# Patient Record
Sex: MALE | Race: BLACK OR AFRICAN AMERICAN | Employment: OTHER | URBAN - METROPOLITAN AREA
[De-identification: names, ages, dates, MRNs, and addresses within clinical notes are randomized per-mention and may not be internally consistent; named-entity substitution may affect disease eponyms.]

---

## 2018-05-08 ENCOUNTER — HOSPITAL ENCOUNTER (OUTPATIENT)
Dept: MRI IMAGING | Age: 48
Discharge: HOME | End: 2018-05-10
Payer: MEDICAID

## 2018-05-08 DIAGNOSIS — D49.7 PITUITARY TUMOR: ICD-10-CM

## 2018-10-15 ENCOUNTER — OFFICE VISIT (OUTPATIENT)
Dept: FAMILY MEDICINE CLINIC | Age: 48
End: 2018-10-15
Payer: MEDICARE

## 2018-10-15 VITALS
BODY MASS INDEX: 28.75 KG/M2 | DIASTOLIC BLOOD PRESSURE: 72 MMHG | TEMPERATURE: 97.9 F | SYSTOLIC BLOOD PRESSURE: 110 MMHG | WEIGHT: 231.25 LBS | RESPIRATION RATE: 18 BRPM | OXYGEN SATURATION: 99 % | HEART RATE: 75 BPM | HEIGHT: 75 IN

## 2018-10-15 DIAGNOSIS — D49.7 PITUITARY TUMOR: ICD-10-CM

## 2018-10-15 DIAGNOSIS — G44.011 INTRACTABLE EPISODIC CLUSTER HEADACHE: ICD-10-CM

## 2018-10-15 DIAGNOSIS — R07.9 CHEST PAIN, UNSPECIFIED TYPE: ICD-10-CM

## 2018-10-15 DIAGNOSIS — K21.9 GASTROESOPHAGEAL REFLUX DISEASE WITHOUT ESOPHAGITIS: ICD-10-CM

## 2018-10-15 DIAGNOSIS — E55.9 VITAMIN D DEFICIENCY: ICD-10-CM

## 2018-10-15 DIAGNOSIS — E34.9 TESTOSTERONE DEFICIENCY: ICD-10-CM

## 2018-10-15 DIAGNOSIS — R07.9 CHEST PAIN, UNSPECIFIED TYPE: Primary | ICD-10-CM

## 2018-10-15 DIAGNOSIS — E07.9 THYROID DISEASE: ICD-10-CM

## 2018-10-15 PROCEDURE — 93000 ELECTROCARDIOGRAM COMPLETE: CPT | Performed by: NURSE PRACTITIONER

## 2018-10-15 PROCEDURE — 99204 OFFICE O/P NEW MOD 45 MIN: CPT | Performed by: NURSE PRACTITIONER

## 2018-10-15 RX ORDER — ERGOCALCIFEROL 1.25 MG/1
50000 CAPSULE ORAL WEEKLY
Qty: 4 CAPSULE | Refills: 3 | Status: SHIPPED | OUTPATIENT
Start: 2018-10-15 | End: 2019-01-15

## 2018-10-15 RX ORDER — SUMATRIPTAN 25 MG/1
25 TABLET, FILM COATED ORAL
Qty: 30 TABLET | Refills: 3 | Status: SHIPPED | OUTPATIENT
Start: 2018-10-15 | End: 2018-10-15 | Stop reason: CLARIF

## 2018-10-15 RX ORDER — SUMATRIPTAN 25 MG/1
25 TABLET, FILM COATED ORAL
Qty: 10 TABLET | Refills: 1 | Status: ON HOLD | OUTPATIENT
Start: 2018-10-15 | End: 2018-12-01 | Stop reason: HOSPADM

## 2018-10-15 RX ORDER — TESTOSTERONE 12.5 MG/1.25G
5 GEL TOPICAL DAILY
COMMUNITY
End: 2019-01-15 | Stop reason: SDUPTHER

## 2018-10-15 RX ORDER — OMEPRAZOLE 20 MG/1
20 TABLET, DELAYED RELEASE ORAL DAILY
Qty: 30 TABLET | Refills: 1 | Status: SHIPPED | OUTPATIENT
Start: 2018-10-15 | End: 2018-10-15 | Stop reason: SDUPTHER

## 2018-10-15 ASSESSMENT — PATIENT HEALTH QUESTIONNAIRE - PHQ9
SUM OF ALL RESPONSES TO PHQ QUESTIONS 1-9: 1
SUM OF ALL RESPONSES TO PHQ9 QUESTIONS 1 & 2: 1
SUM OF ALL RESPONSES TO PHQ QUESTIONS 1-9: 1
1. LITTLE INTEREST OR PLEASURE IN DOING THINGS: 0
2. FEELING DOWN, DEPRESSED OR HOPELESS: 1

## 2018-10-15 ASSESSMENT — ENCOUNTER SYMPTOMS
SHORTNESS OF BREATH: 0
CONSTIPATION: 0
DIARRHEA: 0
WHEEZING: 0
COUGH: 0
NAUSEA: 0
VOMITING: 0

## 2018-10-17 RX ORDER — OMEPRAZOLE 20 MG/1
CAPSULE, DELAYED RELEASE ORAL
Qty: 90 CAPSULE | Refills: 1 | Status: ON HOLD | OUTPATIENT
Start: 2018-10-17 | End: 2018-12-01 | Stop reason: HOSPADM

## 2018-12-01 ENCOUNTER — HOSPITAL ENCOUNTER (EMERGENCY)
Age: 48
Discharge: HOME | End: 2018-12-01
Attending: EMERGENCY MEDICINE
Payer: MEDICARE

## 2018-12-01 ENCOUNTER — ANESTHESIA EVENT (OUTPATIENT)
Dept: ENDOSCOPY | Age: 48
End: 2018-12-01
Payer: MEDICARE

## 2018-12-01 ENCOUNTER — APPOINTMENT (OUTPATIENT)
Dept: GENERAL RADIOLOGY | Age: 48
End: 2018-12-01
Payer: MEDICARE

## 2018-12-01 ENCOUNTER — APPOINTMENT (OUTPATIENT)
Dept: CT IMAGING | Age: 48
End: 2018-12-01
Payer: MEDICARE

## 2018-12-01 ENCOUNTER — ANESTHESIA (OUTPATIENT)
Dept: ENDOSCOPY | Age: 48
End: 2018-12-01
Payer: MEDICARE

## 2018-12-01 VITALS
SYSTOLIC BLOOD PRESSURE: 118 MMHG | TEMPERATURE: 97.6 F | HEART RATE: 63 BPM | RESPIRATION RATE: 12 BRPM | OXYGEN SATURATION: 97 % | DIASTOLIC BLOOD PRESSURE: 76 MMHG | BODY MASS INDEX: 28.12 KG/M2 | WEIGHT: 225 LBS

## 2018-12-01 VITALS — SYSTOLIC BLOOD PRESSURE: 112 MMHG | OXYGEN SATURATION: 70 % | DIASTOLIC BLOOD PRESSURE: 67 MMHG

## 2018-12-01 DIAGNOSIS — R10.13 ABDOMINAL PAIN, EPIGASTRIC: Primary | ICD-10-CM

## 2018-12-01 LAB
ALBUMIN SERPL-MCNC: 4 G/DL (ref 3.5–5.2)
ALP BLD-CCNC: 42 U/L (ref 40–129)
ALT SERPL-CCNC: 17 U/L (ref 0–40)
ANION GAP SERPL CALCULATED.3IONS-SCNC: 17 MMOL/L (ref 7–16)
APTT: 31.9 SEC (ref 24.5–35.1)
AST SERPL-CCNC: 20 U/L (ref 0–39)
BASOPHILS ABSOLUTE: 0.05 E9/L (ref 0–0.2)
BASOPHILS RELATIVE PERCENT: 0.5 % (ref 0–2)
BILIRUB SERPL-MCNC: <0.2 MG/DL (ref 0–1.2)
BUN BLDV-MCNC: 10 MG/DL (ref 6–20)
CALCIUM SERPL-MCNC: 8.8 MG/DL (ref 8.6–10.2)
CHLORIDE BLD-SCNC: 98 MMOL/L (ref 98–107)
CO2: 22 MMOL/L (ref 22–29)
CREAT SERPL-MCNC: 1 MG/DL (ref 0.7–1.2)
EOSINOPHILS ABSOLUTE: 0.13 E9/L (ref 0.05–0.5)
EOSINOPHILS RELATIVE PERCENT: 1.3 % (ref 0–6)
GFR AFRICAN AMERICAN: >60
GFR NON-AFRICAN AMERICAN: >60 ML/MIN/1.73
GLUCOSE BLD-MCNC: 81 MG/DL (ref 74–99)
HCT VFR BLD CALC: 34.4 % (ref 37–54)
HEMOGLOBIN: 11.5 G/DL (ref 12.5–16.5)
IMMATURE GRANULOCYTES #: 0.08 E9/L
IMMATURE GRANULOCYTES %: 0.8 % (ref 0–5)
INR BLD: 1.1
LACTIC ACID: 2.7 MMOL/L (ref 0.5–2.2)
LIPASE: 34 U/L (ref 13–60)
LYMPHOCYTES ABSOLUTE: 6.12 E9/L (ref 1.5–4)
LYMPHOCYTES RELATIVE PERCENT: 59.4 % (ref 20–42)
MCH RBC QN AUTO: 29.9 PG (ref 26–35)
MCHC RBC AUTO-ENTMCNC: 33.4 % (ref 32–34.5)
MCV RBC AUTO: 89.6 FL (ref 80–99.9)
MONOCYTES ABSOLUTE: 0.72 E9/L (ref 0.1–0.95)
MONOCYTES RELATIVE PERCENT: 7 % (ref 2–12)
NEUTROPHILS ABSOLUTE: 3.21 E9/L (ref 1.8–7.3)
NEUTROPHILS RELATIVE PERCENT: 31 % (ref 43–80)
PDW BLD-RTO: 13.6 FL (ref 11.5–15)
PLATELET # BLD: 249 E9/L (ref 130–450)
PMV BLD AUTO: 9.9 FL (ref 7–12)
POTASSIUM REFLEX MAGNESIUM: 3.7 MMOL/L (ref 3.5–5)
PROTHROMBIN TIME: 12.3 SEC (ref 9.3–12.4)
RBC # BLD: 3.84 E12/L (ref 3.8–5.8)
SODIUM BLD-SCNC: 137 MMOL/L (ref 132–146)
TOTAL PROTEIN: 6.9 G/DL (ref 6.4–8.3)
WBC # BLD: 10.3 E9/L (ref 4.5–11.5)

## 2018-12-01 PROCEDURE — 85730 THROMBOPLASTIN TIME PARTIAL: CPT

## 2018-12-01 PROCEDURE — 6360000002 HC RX W HCPCS: Performed by: NURSE ANESTHETIST, CERTIFIED REGISTERED

## 2018-12-01 PROCEDURE — C9113 INJ PANTOPRAZOLE SODIUM, VIA: HCPCS | Performed by: EMERGENCY MEDICINE

## 2018-12-01 PROCEDURE — 80053 COMPREHEN METABOLIC PANEL: CPT

## 2018-12-01 PROCEDURE — 7100000011 HC PHASE II RECOVERY - ADDTL 15 MIN: Performed by: SURGERY

## 2018-12-01 PROCEDURE — 85025 COMPLETE CBC W/AUTO DIFF WBC: CPT

## 2018-12-01 PROCEDURE — 43239 EGD BIOPSY SINGLE/MULTIPLE: CPT | Performed by: SURGERY

## 2018-12-01 PROCEDURE — 83605 ASSAY OF LACTIC ACID: CPT

## 2018-12-01 PROCEDURE — 96374 THER/PROPH/DIAG INJ IV PUSH: CPT

## 2018-12-01 PROCEDURE — 2709999900 HC NON-CHARGEABLE SUPPLY: Performed by: SURGERY

## 2018-12-01 PROCEDURE — 85610 PROTHROMBIN TIME: CPT

## 2018-12-01 PROCEDURE — 7100000010 HC PHASE II RECOVERY - FIRST 15 MIN: Performed by: SURGERY

## 2018-12-01 PROCEDURE — 93005 ELECTROCARDIOGRAM TRACING: CPT | Performed by: EMERGENCY MEDICINE

## 2018-12-01 PROCEDURE — 6360000004 HC RX CONTRAST MEDICATION: Performed by: RADIOLOGY

## 2018-12-01 PROCEDURE — 74177 CT ABD & PELVIS W/CONTRAST: CPT

## 2018-12-01 PROCEDURE — 3700000001 HC ADD 15 MINUTES (ANESTHESIA): Performed by: SURGERY

## 2018-12-01 PROCEDURE — 83690 ASSAY OF LIPASE: CPT

## 2018-12-01 PROCEDURE — 2580000003 HC RX 258: Performed by: NURSE ANESTHETIST, CERTIFIED REGISTERED

## 2018-12-01 PROCEDURE — 88305 TISSUE EXAM BY PATHOLOGIST: CPT

## 2018-12-01 PROCEDURE — 99284 EMERGENCY DEPT VISIT MOD MDM: CPT | Performed by: SURGERY

## 2018-12-01 PROCEDURE — 71045 X-RAY EXAM CHEST 1 VIEW: CPT

## 2018-12-01 PROCEDURE — 3609012400 HC EGD TRANSORAL BIOPSY SINGLE/MULTIPLE: Performed by: SURGERY

## 2018-12-01 PROCEDURE — 99285 EMERGENCY DEPT VISIT HI MDM: CPT

## 2018-12-01 PROCEDURE — 2580000003 HC RX 258: Performed by: EMERGENCY MEDICINE

## 2018-12-01 PROCEDURE — 88342 IMHCHEM/IMCYTCHM 1ST ANTB: CPT

## 2018-12-01 PROCEDURE — 6360000002 HC RX W HCPCS: Performed by: EMERGENCY MEDICINE

## 2018-12-01 PROCEDURE — 96375 TX/PRO/DX INJ NEW DRUG ADDON: CPT

## 2018-12-01 PROCEDURE — 3700000000 HC ANESTHESIA ATTENDED CARE: Performed by: SURGERY

## 2018-12-01 PROCEDURE — 36415 COLL VENOUS BLD VENIPUNCTURE: CPT

## 2018-12-01 RX ORDER — MORPHINE SULFATE 4 MG/ML
8 INJECTION, SOLUTION INTRAMUSCULAR; INTRAVENOUS ONCE
Status: COMPLETED | OUTPATIENT
Start: 2018-12-01 | End: 2018-12-01

## 2018-12-01 RX ORDER — PROPOFOL 10 MG/ML
INJECTION, EMULSION INTRAVENOUS PRN
Status: DISCONTINUED | OUTPATIENT
Start: 2018-12-01 | End: 2018-12-01 | Stop reason: SDUPTHER

## 2018-12-01 RX ORDER — SODIUM CHLORIDE 9 MG/ML
INJECTION, SOLUTION INTRAVENOUS CONTINUOUS PRN
Status: DISCONTINUED | OUTPATIENT
Start: 2018-12-01 | End: 2018-12-01 | Stop reason: SDUPTHER

## 2018-12-01 RX ORDER — 0.9 % SODIUM CHLORIDE 0.9 %
1000 INTRAVENOUS SOLUTION INTRAVENOUS ONCE
Status: COMPLETED | OUTPATIENT
Start: 2018-12-01 | End: 2018-12-01

## 2018-12-01 RX ORDER — SUCRALFATE 1 G/1
1 TABLET ORAL 4 TIMES DAILY
Qty: 120 TABLET | Refills: 3 | Status: SHIPPED | OUTPATIENT
Start: 2018-12-01 | End: 2020-02-14 | Stop reason: SDUPTHER

## 2018-12-01 RX ORDER — PANTOPRAZOLE SODIUM 40 MG/1
40 TABLET, DELAYED RELEASE ORAL
Qty: 30 TABLET | Refills: 5 | Status: SHIPPED | OUTPATIENT
Start: 2018-12-01 | End: 2019-06-05 | Stop reason: SDUPTHER

## 2018-12-01 RX ORDER — PANTOPRAZOLE SODIUM 40 MG/10ML
40 INJECTION, POWDER, LYOPHILIZED, FOR SOLUTION INTRAVENOUS ONCE
Status: COMPLETED | OUTPATIENT
Start: 2018-12-01 | End: 2018-12-01

## 2018-12-01 RX ORDER — ONDANSETRON 2 MG/ML
4 INJECTION INTRAMUSCULAR; INTRAVENOUS EVERY 6 HOURS PRN
Status: DISCONTINUED | OUTPATIENT
Start: 2018-12-01 | End: 2018-12-01 | Stop reason: HOSPADM

## 2018-12-01 RX ADMIN — SODIUM CHLORIDE 1000 ML: 9 INJECTION, SOLUTION INTRAVENOUS at 09:37

## 2018-12-01 RX ADMIN — HYDROMORPHONE HYDROCHLORIDE 1 MG: 1 INJECTION, SOLUTION INTRAMUSCULAR; INTRAVENOUS; SUBCUTANEOUS at 12:28

## 2018-12-01 RX ADMIN — IOPAMIDOL 110 ML: 755 INJECTION, SOLUTION INTRAVENOUS at 11:04

## 2018-12-01 RX ADMIN — PANTOPRAZOLE SODIUM 40 MG: 40 INJECTION, POWDER, FOR SOLUTION INTRAVENOUS at 09:48

## 2018-12-01 RX ADMIN — PROPOFOL 280 MG: 10 INJECTION, EMULSION INTRAVENOUS at 13:07

## 2018-12-01 RX ADMIN — MORPHINE SULFATE 8 MG: 4 INJECTION, SOLUTION INTRAMUSCULAR; INTRAVENOUS at 09:48

## 2018-12-01 RX ADMIN — SODIUM CHLORIDE: 9 INJECTION, SOLUTION INTRAVENOUS at 12:57

## 2018-12-01 RX ADMIN — ONDANSETRON 4 MG: 2 INJECTION INTRAMUSCULAR; INTRAVENOUS at 09:48

## 2018-12-01 ASSESSMENT — PAIN SCALES - GENERAL
PAINLEVEL_OUTOF10: 0
PAINLEVEL_OUTOF10: 0
PAINLEVEL_OUTOF10: 9
PAINLEVEL_OUTOF10: 10
PAINLEVEL_OUTOF10: 10
PAINLEVEL_OUTOF10: 0

## 2018-12-01 ASSESSMENT — ENCOUNTER SYMPTOMS
VOMITING: 1
ABDOMINAL PAIN: 1
RESPIRATORY NEGATIVE: 1
NAUSEA: 1
EYES NEGATIVE: 1

## 2018-12-01 ASSESSMENT — PAIN DESCRIPTION - LOCATION
LOCATION: ABDOMEN
LOCATION: ABDOMEN

## 2018-12-01 ASSESSMENT — PAIN DESCRIPTION - PAIN TYPE
TYPE: ACUTE PAIN
TYPE: ACUTE PAIN

## 2018-12-01 ASSESSMENT — LIFESTYLE VARIABLES: SMOKING_STATUS: 1
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
## 2018-12-01 NOTE — ANESTHESIA PRE PROCEDURE
since                                                                                BMI:   Wt Readings from Last 3 Encounters:   18 225 lb (102.1 kg)   10/15/18 231 lb 4 oz (104.9 kg)   18 222 lb (100.7 kg)     Body mass index is 28.12 kg/m². CBC:   Lab Results   Component Value Date    WBC 10.3 2018    RBC 3.84 2018    HGB 11.5 2018    HCT 34.4 2018    MCV 89.6 2018    RDW 13.6 2018     2018       CMP:   Lab Results   Component Value Date     2018    K 3.7 2018    CL 98 2018    CO2 22 2018    BUN 10 2018    CREATININE 1.0 2018    GFRAA >60 2018    LABGLOM >60 2018    GLUCOSE 81 2018    GLUCOSE 74 2011    PROT 6.9 2018    CALCIUM 8.8 2018    BILITOT <0.2 2018    ALKPHOS 42 2018    AST 20 2018    ALT 17 2018       POC Tests: No results for input(s): POCGLU, POCNA, POCK, POCCL, POCBUN, POCHEMO, POCHCT in the last 72 hours. Coags:   Lab Results   Component Value Date    PROTIME 12.3 2018    PROTIME 11.0 2011    INR 1.1 2018    APTT 31.9 2018       HCG (If Applicable): No results found for: PREGTESTUR, PREGSERUM, HCG, HCGQUANT     ABGs: No results found for: PHART, PO2ART, IJF3UAZ, DLB1NKE, BEART, X4RKYLIN     Type & Screen (If Applicable):  No results found for: LABABO, LABRH    Ct Abdomen Pelvis W Iv Contrast Additional Contrast? None    Result Date: 2018  Patient MRN:  [id number] : 1970 Age: 50 years Gender: Male Order Date:  2018 9:30 AM EXAM: CT ABDOMEN PELVIS W IV CONTRAST NUMBER OF IMAGES \ views:  160 INDICATION:  Epigastric abdominal pain COMPARISON: 2016 Technique: Low-dose CT  acquisition technique included one of following options; 1 . Automated exposure control, 2. Adjustment of MA and or KV according to patient's size or 3. Use of iterative reconstruction.  Multiple computerized tomography

## 2018-12-02 PROBLEM — R10.13 ABDOMINAL PAIN, EPIGASTRIC: Status: ACTIVE | Noted: 2018-12-02

## 2018-12-03 LAB
EKG ATRIAL RATE: 67 BPM
EKG P AXIS: 50 DEGREES
EKG P-R INTERVAL: 170 MS
EKG Q-T INTERVAL: 542 MS
EKG QRS DURATION: 70 MS
EKG QTC CALCULATION (BAZETT): 572 MS
EKG R AXIS: 23 DEGREES
EKG T AXIS: -20 DEGREES
EKG VENTRICULAR RATE: 67 BPM

## 2018-12-18 ENCOUNTER — HOSPITAL ENCOUNTER (OUTPATIENT)
Age: 48
Discharge: HOME | End: 2018-12-20
Payer: MEDICARE

## 2018-12-18 ENCOUNTER — OFFICE VISIT (OUTPATIENT)
Dept: FAMILY MEDICINE CLINIC | Age: 48
End: 2018-12-18
Payer: MEDICARE

## 2018-12-18 VITALS
RESPIRATION RATE: 18 BRPM | HEIGHT: 75 IN | SYSTOLIC BLOOD PRESSURE: 126 MMHG | OXYGEN SATURATION: 98 % | DIASTOLIC BLOOD PRESSURE: 80 MMHG | BODY MASS INDEX: 29.47 KG/M2 | WEIGHT: 237 LBS | HEART RATE: 70 BPM | TEMPERATURE: 97.9 F

## 2018-12-18 DIAGNOSIS — Z77.018 SUSPECTED EXPOSURE TO HEAVY METALS: ICD-10-CM

## 2018-12-18 DIAGNOSIS — M25.552 LEFT HIP PAIN: Primary | ICD-10-CM

## 2018-12-18 DIAGNOSIS — Z87.898 H/O: PITUITARY TUMOR: ICD-10-CM

## 2018-12-18 PROCEDURE — 82495 ASSAY OF CHROMIUM: CPT

## 2018-12-18 PROCEDURE — 99214 OFFICE O/P EST MOD 30 MIN: CPT | Performed by: NURSE PRACTITIONER

## 2018-12-18 PROCEDURE — 83018 HEAVY METAL QUAN EACH NES: CPT

## 2018-12-18 ASSESSMENT — ENCOUNTER SYMPTOMS
CONSTIPATION: 0
COUGH: 0
NAUSEA: 0
SHORTNESS OF BREATH: 0
WHEEZING: 0
DIARRHEA: 0
VOMITING: 0

## 2018-12-21 LAB
CHROMIUM, SERUM: <1 UG/L
COBALT, PLASMA: <1 UG/L

## 2018-12-27 ENCOUNTER — HOSPITAL ENCOUNTER (OUTPATIENT)
Dept: GENERAL RADIOLOGY | Age: 48
Discharge: HOME | End: 2018-12-29
Payer: MEDICARE

## 2018-12-27 ENCOUNTER — HOSPITAL ENCOUNTER (OUTPATIENT)
Age: 48
Discharge: HOME | End: 2018-12-29
Payer: MEDICARE

## 2018-12-27 DIAGNOSIS — M25.552 LEFT HIP PAIN: ICD-10-CM

## 2018-12-27 PROCEDURE — 73502 X-RAY EXAM HIP UNI 2-3 VIEWS: CPT

## 2019-01-15 ENCOUNTER — OFFICE VISIT (OUTPATIENT)
Dept: ENDOCRINOLOGY | Age: 49
End: 2019-01-15
Payer: MEDICARE

## 2019-01-15 VITALS
HEART RATE: 70 BPM | DIASTOLIC BLOOD PRESSURE: 86 MMHG | HEIGHT: 75 IN | BODY MASS INDEX: 30.84 KG/M2 | OXYGEN SATURATION: 98 % | RESPIRATION RATE: 16 BRPM | WEIGHT: 248 LBS | SYSTOLIC BLOOD PRESSURE: 146 MMHG

## 2019-01-15 DIAGNOSIS — E23.0 PANHYPOPITUITARISM (HCC): ICD-10-CM

## 2019-01-15 DIAGNOSIS — E29.1 MALE HYPOGONADISM: Primary | ICD-10-CM

## 2019-01-15 DIAGNOSIS — E23.2 DIABETES INSIPIDUS (HCC): ICD-10-CM

## 2019-01-15 DIAGNOSIS — E03.8 CENTRAL HYPOTHYROIDISM: ICD-10-CM

## 2019-01-15 DIAGNOSIS — E55.9 VITAMIN D DEFICIENCY: ICD-10-CM

## 2019-01-15 PROCEDURE — 4004F PT TOBACCO SCREEN RCVD TLK: CPT | Performed by: INTERNAL MEDICINE

## 2019-01-15 PROCEDURE — G8484 FLU IMMUNIZE NO ADMIN: HCPCS | Performed by: INTERNAL MEDICINE

## 2019-01-15 PROCEDURE — G8427 DOCREV CUR MEDS BY ELIG CLIN: HCPCS | Performed by: INTERNAL MEDICINE

## 2019-01-15 PROCEDURE — 99205 OFFICE O/P NEW HI 60 MIN: CPT | Performed by: INTERNAL MEDICINE

## 2019-01-15 PROCEDURE — G8417 CALC BMI ABV UP PARAM F/U: HCPCS | Performed by: INTERNAL MEDICINE

## 2019-01-15 RX ORDER — DESMOPRESSIN ACETATE 0.2 MG/1
TABLET ORAL
Qty: 225 TABLET | Refills: 3 | Status: SHIPPED | OUTPATIENT
Start: 2019-01-15 | End: 2019-07-10 | Stop reason: SDUPTHER

## 2019-01-15 RX ORDER — TESTOSTERONE 12.5 MG/1.25G
GEL TOPICAL
Qty: 20 PACKAGE | Refills: 3 | Status: SHIPPED | OUTPATIENT
Start: 2019-01-15 | End: 2019-03-01

## 2019-01-15 RX ORDER — LEVOTHYROXINE SODIUM 137 UG/1
137 TABLET ORAL DAILY
Qty: 30 TABLET | Refills: 5 | Status: SHIPPED | OUTPATIENT
Start: 2019-01-15 | End: 2019-04-02

## 2019-01-15 RX ORDER — HYDROCORTISONE 20 MG/1
TABLET ORAL
Qty: 135 TABLET | Refills: 5 | Status: SHIPPED | OUTPATIENT
Start: 2019-01-15 | End: 2019-07-10 | Stop reason: SDUPTHER

## 2019-01-21 PROBLEM — E23.0 PANHYPOPITUITARISM (HCC): Status: ACTIVE | Noted: 2019-01-21

## 2019-01-21 PROBLEM — E29.1 MALE HYPOGONADISM: Status: ACTIVE | Noted: 2019-01-21

## 2019-01-21 PROBLEM — E55.9 VITAMIN D DEFICIENCY: Status: ACTIVE | Noted: 2019-01-21

## 2019-01-21 PROBLEM — E23.2 DIABETES INSIPIDUS (HCC): Status: ACTIVE | Noted: 2019-01-21

## 2019-02-19 ENCOUNTER — TELEPHONE (OUTPATIENT)
Dept: SURGERY | Age: 49
End: 2019-02-19

## 2019-02-27 ENCOUNTER — HOSPITAL ENCOUNTER (EMERGENCY)
Age: 49
Discharge: HOME | End: 2019-02-27
Attending: EMERGENCY MEDICINE
Payer: MEDICARE

## 2019-02-27 ENCOUNTER — APPOINTMENT (OUTPATIENT)
Dept: CT IMAGING | Age: 49
End: 2019-02-27
Payer: MEDICARE

## 2019-02-27 ENCOUNTER — APPOINTMENT (OUTPATIENT)
Dept: GENERAL RADIOLOGY | Age: 49
End: 2019-02-27
Payer: MEDICARE

## 2019-02-27 ENCOUNTER — TELEPHONE (OUTPATIENT)
Dept: FAMILY MEDICINE CLINIC | Age: 49
End: 2019-02-27

## 2019-02-27 VITALS
HEART RATE: 69 BPM | DIASTOLIC BLOOD PRESSURE: 96 MMHG | HEIGHT: 75 IN | RESPIRATION RATE: 14 BRPM | WEIGHT: 240 LBS | SYSTOLIC BLOOD PRESSURE: 115 MMHG | TEMPERATURE: 97.8 F | BODY MASS INDEX: 29.84 KG/M2 | OXYGEN SATURATION: 98 %

## 2019-02-27 DIAGNOSIS — H81.10 BENIGN PAROXYSMAL POSITIONAL VERTIGO, UNSPECIFIED LATERALITY: Primary | ICD-10-CM

## 2019-02-27 DIAGNOSIS — B34.9 VIRAL ILLNESS: ICD-10-CM

## 2019-02-27 LAB
ANION GAP SERPL CALCULATED.3IONS-SCNC: 13 MMOL/L (ref 7–16)
BASOPHILS ABSOLUTE: 0.06 E9/L (ref 0–0.2)
BASOPHILS RELATIVE PERCENT: 0.5 % (ref 0–2)
BUN BLDV-MCNC: 15 MG/DL (ref 6–20)
CALCIUM SERPL-MCNC: 9.8 MG/DL (ref 8.6–10.2)
CHLORIDE BLD-SCNC: 100 MMOL/L (ref 98–107)
CO2: 26 MMOL/L (ref 22–29)
CREAT SERPL-MCNC: 0.9 MG/DL (ref 0.7–1.2)
EOSINOPHILS ABSOLUTE: 0.15 E9/L (ref 0.05–0.5)
EOSINOPHILS RELATIVE PERCENT: 1.3 % (ref 0–6)
GFR AFRICAN AMERICAN: >60
GFR NON-AFRICAN AMERICAN: >60 ML/MIN/1.73
GLUCOSE BLD-MCNC: 98 MG/DL (ref 74–99)
HCT VFR BLD CALC: 39.5 % (ref 37–54)
HEMOGLOBIN: 12.9 G/DL (ref 12.5–16.5)
IMMATURE GRANULOCYTES #: 0.12 E9/L
IMMATURE GRANULOCYTES %: 1.1 % (ref 0–5)
INFLUENZA A BY PCR: NOT DETECTED
INFLUENZA B BY PCR: NOT DETECTED
LACTIC ACID: 1.1 MMOL/L (ref 0.5–2.2)
LYMPHOCYTES ABSOLUTE: 5.26 E9/L (ref 1.5–4)
LYMPHOCYTES RELATIVE PERCENT: 46.9 % (ref 20–42)
MCH RBC QN AUTO: 30.5 PG (ref 26–35)
MCHC RBC AUTO-ENTMCNC: 32.7 % (ref 32–34.5)
MCV RBC AUTO: 93.4 FL (ref 80–99.9)
MONOCYTES ABSOLUTE: 0.66 E9/L (ref 0.1–0.95)
MONOCYTES RELATIVE PERCENT: 5.9 % (ref 2–12)
NEUTROPHILS ABSOLUTE: 4.97 E9/L (ref 1.8–7.3)
NEUTROPHILS RELATIVE PERCENT: 44.3 % (ref 43–80)
PDW BLD-RTO: 14.5 FL (ref 11.5–15)
PLATELET # BLD: 232 E9/L (ref 130–450)
PMV BLD AUTO: 10 FL (ref 7–12)
POTASSIUM REFLEX MAGNESIUM: 3.7 MMOL/L (ref 3.5–5)
RBC # BLD: 4.23 E12/L (ref 3.8–5.8)
SODIUM BLD-SCNC: 139 MMOL/L (ref 132–146)
TROPONIN: <0.01 NG/ML (ref 0–0.03)
WBC # BLD: 11.2 E9/L (ref 4.5–11.5)

## 2019-02-27 PROCEDURE — 70450 CT HEAD/BRAIN W/O DYE: CPT

## 2019-02-27 PROCEDURE — 2580000003 HC RX 258: Performed by: EMERGENCY MEDICINE

## 2019-02-27 PROCEDURE — 96374 THER/PROPH/DIAG INJ IV PUSH: CPT

## 2019-02-27 PROCEDURE — 96375 TX/PRO/DX INJ NEW DRUG ADDON: CPT

## 2019-02-27 PROCEDURE — 6360000002 HC RX W HCPCS: Performed by: EMERGENCY MEDICINE

## 2019-02-27 PROCEDURE — 99285 EMERGENCY DEPT VISIT HI MDM: CPT

## 2019-02-27 PROCEDURE — 80048 BASIC METABOLIC PNL TOTAL CA: CPT

## 2019-02-27 PROCEDURE — 6370000000 HC RX 637 (ALT 250 FOR IP): Performed by: EMERGENCY MEDICINE

## 2019-02-27 PROCEDURE — 83605 ASSAY OF LACTIC ACID: CPT

## 2019-02-27 PROCEDURE — 36415 COLL VENOUS BLD VENIPUNCTURE: CPT

## 2019-02-27 PROCEDURE — 84484 ASSAY OF TROPONIN QUANT: CPT

## 2019-02-27 PROCEDURE — 87502 INFLUENZA DNA AMP PROBE: CPT

## 2019-02-27 PROCEDURE — 71046 X-RAY EXAM CHEST 2 VIEWS: CPT

## 2019-02-27 PROCEDURE — 85025 COMPLETE CBC W/AUTO DIFF WBC: CPT

## 2019-02-27 PROCEDURE — 93005 ELECTROCARDIOGRAM TRACING: CPT | Performed by: NURSE PRACTITIONER

## 2019-02-27 RX ORDER — ACETAMINOPHEN 500 MG
1000 TABLET ORAL ONCE
Status: COMPLETED | OUTPATIENT
Start: 2019-02-27 | End: 2019-02-27

## 2019-02-27 RX ORDER — 0.9 % SODIUM CHLORIDE 0.9 %
1000 INTRAVENOUS SOLUTION INTRAVENOUS ONCE
Status: COMPLETED | OUTPATIENT
Start: 2019-02-27 | End: 2019-02-27

## 2019-02-27 RX ORDER — LORAZEPAM 2 MG/ML
1 INJECTION INTRAMUSCULAR ONCE
Status: DISCONTINUED | OUTPATIENT
Start: 2019-02-27 | End: 2019-02-27

## 2019-02-27 RX ORDER — MECLIZINE HYDROCHLORIDE 25 MG/1
25 TABLET ORAL 3 TIMES DAILY PRN
Qty: 30 TABLET | Refills: 0 | Status: SHIPPED | OUTPATIENT
Start: 2019-02-27 | End: 2019-03-08 | Stop reason: SDUPTHER

## 2019-02-27 RX ORDER — ONDANSETRON 2 MG/ML
4 INJECTION INTRAMUSCULAR; INTRAVENOUS ONCE
Status: DISCONTINUED | OUTPATIENT
Start: 2019-02-27 | End: 2019-02-27

## 2019-02-27 RX ORDER — METOCLOPRAMIDE HYDROCHLORIDE 5 MG/ML
10 INJECTION INTRAMUSCULAR; INTRAVENOUS ONCE
Status: COMPLETED | OUTPATIENT
Start: 2019-02-27 | End: 2019-02-27

## 2019-02-27 RX ORDER — LORAZEPAM 2 MG/ML
2 INJECTION INTRAMUSCULAR ONCE
Status: COMPLETED | OUTPATIENT
Start: 2019-02-27 | End: 2019-02-27

## 2019-02-27 RX ORDER — DIPHENHYDRAMINE HYDROCHLORIDE 50 MG/ML
25 INJECTION INTRAMUSCULAR; INTRAVENOUS ONCE
Status: COMPLETED | OUTPATIENT
Start: 2019-02-27 | End: 2019-02-27

## 2019-02-27 RX ADMIN — SODIUM CHLORIDE 1000 ML: 9 INJECTION, SOLUTION INTRAVENOUS at 20:31

## 2019-02-27 RX ADMIN — DIPHENHYDRAMINE HYDROCHLORIDE 25 MG: 50 INJECTION, SOLUTION INTRAMUSCULAR; INTRAVENOUS at 20:31

## 2019-02-27 RX ADMIN — METOCLOPRAMIDE 10 MG: 5 INJECTION, SOLUTION INTRAMUSCULAR; INTRAVENOUS at 20:31

## 2019-02-27 RX ADMIN — ACETAMINOPHEN 1000 MG: 500 TABLET ORAL at 20:31

## 2019-02-27 RX ADMIN — LORAZEPAM 2 MG: 2 INJECTION INTRAMUSCULAR; INTRAVENOUS at 20:54

## 2019-02-27 ASSESSMENT — ENCOUNTER SYMPTOMS
EYE DISCHARGE: 0
WHEEZING: 0
SINUS PRESSURE: 0
NAUSEA: 0
SORE THROAT: 0
EYE REDNESS: 0
BACK PAIN: 0
EYE PAIN: 0
SHORTNESS OF BREATH: 0
VOMITING: 0
DIARRHEA: 0
ABDOMINAL PAIN: 0
COUGH: 0

## 2019-02-27 ASSESSMENT — PAIN SCALES - GENERAL: PAINLEVEL_OUTOF10: 1

## 2019-03-01 ENCOUNTER — TELEPHONE (OUTPATIENT)
Dept: ENDOCRINOLOGY | Age: 49
End: 2019-03-01

## 2019-03-01 DIAGNOSIS — E29.1 MALE HYPOGONADISM: Primary | ICD-10-CM

## 2019-03-01 LAB
EKG ATRIAL RATE: 71 BPM
EKG P AXIS: 67 DEGREES
EKG P-R INTERVAL: 170 MS
EKG Q-T INTERVAL: 752 MS
EKG QRS DURATION: 84 MS
EKG QTC CALCULATION (BAZETT): 817 MS
EKG R AXIS: 8 DEGREES
EKG T AXIS: 78 DEGREES
EKG VENTRICULAR RATE: 71 BPM

## 2019-03-01 RX ORDER — TESTOSTERONE 16.2 MG/G
GEL TRANSDERMAL
Qty: 2 BOTTLE | Refills: 2 | Status: SHIPPED | OUTPATIENT
Start: 2019-03-01 | End: 2019-04-02 | Stop reason: SDUPTHER

## 2019-03-04 ENCOUNTER — TELEPHONE (OUTPATIENT)
Dept: ENDOCRINOLOGY | Age: 49
End: 2019-03-04

## 2019-03-08 ENCOUNTER — OFFICE VISIT (OUTPATIENT)
Dept: FAMILY MEDICINE CLINIC | Age: 49
End: 2019-03-08
Payer: MEDICARE

## 2019-03-08 VITALS
RESPIRATION RATE: 16 BRPM | DIASTOLIC BLOOD PRESSURE: 78 MMHG | SYSTOLIC BLOOD PRESSURE: 112 MMHG | WEIGHT: 244 LBS | TEMPERATURE: 97.6 F | HEIGHT: 75 IN | BODY MASS INDEX: 30.34 KG/M2 | HEART RATE: 71 BPM | OXYGEN SATURATION: 98 %

## 2019-03-08 DIAGNOSIS — R11.0 NAUSEA: ICD-10-CM

## 2019-03-08 DIAGNOSIS — R42 VERTIGO: Primary | ICD-10-CM

## 2019-03-08 DIAGNOSIS — R09.81 SINUS CONGESTION: ICD-10-CM

## 2019-03-08 DIAGNOSIS — B35.4 TINEA CORPORIS: ICD-10-CM

## 2019-03-08 PROCEDURE — G8417 CALC BMI ABV UP PARAM F/U: HCPCS | Performed by: NURSE PRACTITIONER

## 2019-03-08 PROCEDURE — 99214 OFFICE O/P EST MOD 30 MIN: CPT | Performed by: NURSE PRACTITIONER

## 2019-03-08 PROCEDURE — G8484 FLU IMMUNIZE NO ADMIN: HCPCS | Performed by: NURSE PRACTITIONER

## 2019-03-08 PROCEDURE — G8427 DOCREV CUR MEDS BY ELIG CLIN: HCPCS | Performed by: NURSE PRACTITIONER

## 2019-03-08 PROCEDURE — 4004F PT TOBACCO SCREEN RCVD TLK: CPT | Performed by: NURSE PRACTITIONER

## 2019-03-08 RX ORDER — MECLIZINE HYDROCHLORIDE 25 MG/1
25 TABLET ORAL 3 TIMES DAILY PRN
Qty: 30 TABLET | Refills: 0 | Status: SHIPPED | OUTPATIENT
Start: 2019-03-08 | End: 2019-03-18

## 2019-03-08 RX ORDER — LEVOTHYROXINE SODIUM 137 UG/1
137 TABLET ORAL DAILY
Qty: 30 TABLET | Refills: 5 | Status: CANCELLED | OUTPATIENT
Start: 2019-03-08

## 2019-03-08 RX ORDER — DESMOPRESSIN ACETATE 0.2 MG/1
TABLET ORAL
Qty: 225 TABLET | Refills: 3 | Status: CANCELLED | OUTPATIENT
Start: 2019-03-08

## 2019-03-08 RX ORDER — TESTOSTERONE 16.2 MG/G
GEL TRANSDERMAL
Qty: 2 BOTTLE | Refills: 2 | Status: CANCELLED | OUTPATIENT
Start: 2019-03-08 | End: 2020-03-08

## 2019-03-08 RX ORDER — CLOTRIMAZOLE AND BETAMETHASONE DIPROPIONATE 10; .64 MG/G; MG/G
CREAM TOPICAL
Qty: 45 G | Refills: 0 | Status: SHIPPED | OUTPATIENT
Start: 2019-03-08 | End: 2019-07-10

## 2019-03-08 RX ORDER — SUCRALFATE 1 G/1
1 TABLET ORAL 4 TIMES DAILY
Qty: 120 TABLET | Refills: 3 | Status: CANCELLED | OUTPATIENT
Start: 2019-03-08

## 2019-03-08 RX ORDER — HYDROCORTISONE 20 MG/1
TABLET ORAL
Qty: 135 TABLET | Refills: 5 | Status: CANCELLED | OUTPATIENT
Start: 2019-03-08

## 2019-03-08 RX ORDER — FLUTICASONE PROPIONATE 50 MCG
2 SPRAY, SUSPENSION (ML) NASAL DAILY
Qty: 1 BOTTLE | Refills: 0 | Status: SHIPPED | OUTPATIENT
Start: 2019-03-08 | End: 2019-03-08 | Stop reason: SDUPTHER

## 2019-03-08 RX ORDER — ONDANSETRON 4 MG/1
4 TABLET, ORALLY DISINTEGRATING ORAL EVERY 8 HOURS PRN
Qty: 30 TABLET | Refills: 0 | Status: SHIPPED | OUTPATIENT
Start: 2019-03-08 | End: 2019-06-05 | Stop reason: SDUPTHER

## 2019-03-08 RX ORDER — DIPHENHYDRAMINE HCL 25 MG
25 TABLET ORAL EVERY 6 HOURS PRN
COMMUNITY
End: 2021-12-23 | Stop reason: SDUPTHER

## 2019-03-08 ASSESSMENT — ENCOUNTER SYMPTOMS
DIARRHEA: 0
SINUS PRESSURE: 1
WHEEZING: 0
SHORTNESS OF BREATH: 0
COUGH: 0
VOMITING: 0
NAUSEA: 1
CONSTIPATION: 0

## 2019-03-08 ASSESSMENT — PATIENT HEALTH QUESTIONNAIRE - PHQ9
SUM OF ALL RESPONSES TO PHQ9 QUESTIONS 1 & 2: 0
1. LITTLE INTEREST OR PLEASURE IN DOING THINGS: 0
SUM OF ALL RESPONSES TO PHQ QUESTIONS 1-9: 0
2. FEELING DOWN, DEPRESSED OR HOPELESS: 0
SUM OF ALL RESPONSES TO PHQ QUESTIONS 1-9: 0

## 2019-03-11 RX ORDER — FLUTICASONE PROPIONATE 50 MCG
SPRAY, SUSPENSION (ML) NASAL
Qty: 1 BOTTLE | Refills: 1 | Status: SHIPPED | OUTPATIENT
Start: 2019-03-11 | End: 2019-03-13 | Stop reason: SDUPTHER

## 2019-03-13 DIAGNOSIS — R09.81 SINUS CONGESTION: ICD-10-CM

## 2019-03-13 RX ORDER — FLUTICASONE PROPIONATE 50 MCG
SPRAY, SUSPENSION (ML) NASAL
Qty: 1 BOTTLE | Refills: 1 | Status: SHIPPED | OUTPATIENT
Start: 2019-03-13 | End: 2020-01-13

## 2019-03-25 ENCOUNTER — NURSE ONLY (OUTPATIENT)
Dept: FAMILY MEDICINE CLINIC | Age: 49
End: 2019-03-25
Payer: MEDICARE

## 2019-03-25 ENCOUNTER — HOSPITAL ENCOUNTER (OUTPATIENT)
Age: 49
Discharge: HOME | End: 2019-03-27
Payer: MEDICARE

## 2019-03-25 DIAGNOSIS — E55.9 VITAMIN D DEFICIENCY: ICD-10-CM

## 2019-03-25 DIAGNOSIS — E29.1 MALE HYPOGONADISM: ICD-10-CM

## 2019-03-25 DIAGNOSIS — E23.2 DIABETES INSIPIDUS (HCC): ICD-10-CM

## 2019-03-25 DIAGNOSIS — E23.0 PANHYPOPITUITARISM (HCC): ICD-10-CM

## 2019-03-25 LAB
ALBUMIN SERPL-MCNC: 4.4 G/DL (ref 3.5–5.2)
ALP BLD-CCNC: 44 U/L (ref 40–129)
ALT SERPL-CCNC: 25 U/L (ref 0–40)
ANION GAP SERPL CALCULATED.3IONS-SCNC: 13 MMOL/L (ref 7–16)
AST SERPL-CCNC: 22 U/L (ref 0–39)
BILIRUB SERPL-MCNC: <0.2 MG/DL (ref 0–1.2)
BUN BLDV-MCNC: 13 MG/DL (ref 6–20)
CALCIUM SERPL-MCNC: 9.2 MG/DL (ref 8.6–10.2)
CHLORIDE BLD-SCNC: 100 MMOL/L (ref 98–107)
CO2: 27 MMOL/L (ref 22–29)
CREAT SERPL-MCNC: 1.2 MG/DL (ref 0.7–1.2)
GFR AFRICAN AMERICAN: >60
GFR NON-AFRICAN AMERICAN: >60 ML/MIN/1.73
GLUCOSE BLD-MCNC: 100 MG/DL (ref 74–99)
HCT VFR BLD CALC: 38.6 % (ref 37–54)
HEMOGLOBIN: 12.3 G/DL (ref 12.5–16.5)
MCH RBC QN AUTO: 30.2 PG (ref 26–35)
MCHC RBC AUTO-ENTMCNC: 31.9 % (ref 32–34.5)
MCV RBC AUTO: 94.8 FL (ref 80–99.9)
PDW BLD-RTO: 14.7 FL (ref 11.5–15)
PLATELET # BLD: 233 E9/L (ref 130–450)
PMV BLD AUTO: 10.6 FL (ref 7–12)
POTASSIUM SERPL-SCNC: 4.3 MMOL/L (ref 3.5–5)
PROLACTIN: 0.05 NG/ML
PROSTATE SPECIFIC ANTIGEN: 0.6 NG/ML (ref 0–4)
RBC # BLD: 4.07 E12/L (ref 3.8–5.8)
SODIUM BLD-SCNC: 140 MMOL/L (ref 132–146)
T4 FREE: 0.86 NG/DL (ref 0.93–1.7)
TOTAL PROTEIN: 7.5 G/DL (ref 6.4–8.3)
VITAMIN D 25-HYDROXY: 16 NG/ML (ref 30–100)
WBC # BLD: 9.1 E9/L (ref 4.5–11.5)

## 2019-03-25 PROCEDURE — 84439 ASSAY OF FREE THYROXINE: CPT

## 2019-03-25 PROCEDURE — 85027 COMPLETE CBC AUTOMATED: CPT

## 2019-03-25 PROCEDURE — 84403 ASSAY OF TOTAL TESTOSTERONE: CPT

## 2019-03-25 PROCEDURE — 84153 ASSAY OF PSA TOTAL: CPT

## 2019-03-25 PROCEDURE — 84270 ASSAY OF SEX HORMONE GLOBUL: CPT

## 2019-03-25 PROCEDURE — 80053 COMPREHEN METABOLIC PANEL: CPT

## 2019-03-25 PROCEDURE — 82306 VITAMIN D 25 HYDROXY: CPT

## 2019-03-25 PROCEDURE — 36415 COLL VENOUS BLD VENIPUNCTURE: CPT | Performed by: NURSE PRACTITIONER

## 2019-03-25 PROCEDURE — 84146 ASSAY OF PROLACTIN: CPT

## 2019-03-29 LAB
SEX HORMONE BINDING GLOBULIN: 47 NMOL/L (ref 11–80)
TESTOSTERONE FREE-NONMALE: 41.9 PG/ML (ref 47–244)
TESTOSTERONE TOTAL: 270 NG/DL (ref 220–1000)

## 2019-04-02 ENCOUNTER — TELEPHONE (OUTPATIENT)
Dept: ENDOCRINOLOGY | Age: 49
End: 2019-04-02

## 2019-04-02 DIAGNOSIS — E03.8 CENTRAL HYPOTHYROIDISM: Primary | ICD-10-CM

## 2019-04-02 DIAGNOSIS — E29.1 MALE HYPOGONADISM: ICD-10-CM

## 2019-04-02 DIAGNOSIS — E55.9 VITAMIN D DEFICIENCY: ICD-10-CM

## 2019-04-02 RX ORDER — LEVOTHYROXINE SODIUM 0.15 MG/1
150 TABLET ORAL DAILY
Qty: 30 TABLET | Refills: 5 | Status: SHIPPED | OUTPATIENT
Start: 2019-04-02 | End: 2019-07-10 | Stop reason: SDUPTHER

## 2019-04-02 RX ORDER — TESTOSTERONE 16.2 MG/G
GEL TRANSDERMAL
Qty: 2 BOTTLE | Refills: 2
Start: 2019-04-02 | End: 2019-06-05 | Stop reason: SDUPTHER

## 2019-04-02 NOTE — TELEPHONE ENCOUNTER
Notify pt,  I have reviewed your recent lab results      · Thyroid hormones still low, confirm he is on levothyroxine 137 mcg daily. If so, increase to 150 mcg daily and repeat labs in 6-8 weeks   · VitD level was very low. Take Vitamin D 50.000 units one tab once a week for 12 weeks. At the end of 12 weeks, start taking Vitamin D3 2000 units daily over the counter. Prescription for weekly vitD sent to the pharmacy  · Testosterone level still below goal. Confirm he is currently on Androgel (apply 1 pump press to one upper arm and shoulder and then apply 1  pump press to the opposite upper arm and shoulder). If so, increase dose to 2 pump press to one upper arm and shoulder and then apply 2  pump press to the opposite upper arm and shoulder.  Repeat labs in 6-8 weeks

## 2019-05-06 DIAGNOSIS — R11.0 NAUSEA: ICD-10-CM

## 2019-05-06 DIAGNOSIS — E29.1 MALE HYPOGONADISM: ICD-10-CM

## 2019-05-06 NOTE — TELEPHONE ENCOUNTER
Requested Prescriptions     Pending Prescriptions Disp Refills    ondansetron (ZOFRAN ODT) 4 MG disintegrating tablet 30 tablet 0     Sig: Take 1 tablet by mouth every 8 hours as needed for Nausea or Vomiting    Testosterone (ANDROGEL PUMP) 20.25 MG/ACT (1.62%) GEL gel 2 Bottle 2     Sig: Apply 2 pump press to one upper arm and shoulder and then apply 2  pump press to the opposite upper arm and shoulder.        Next appt is 6/24/2019  Last appt was 3/8/2019

## 2019-05-07 RX ORDER — TESTOSTERONE 16.2 MG/G
GEL TRANSDERMAL
Qty: 2 BOTTLE | Refills: 2 | OUTPATIENT
Start: 2019-05-07 | End: 2020-05-06

## 2019-05-07 RX ORDER — ONDANSETRON 4 MG/1
4 TABLET, ORALLY DISINTEGRATING ORAL EVERY 8 HOURS PRN
Qty: 30 TABLET | Refills: 0 | OUTPATIENT
Start: 2019-05-07

## 2019-05-31 ENCOUNTER — OFFICE VISIT (OUTPATIENT)
Dept: FAMILY MEDICINE CLINIC | Age: 49
End: 2019-05-31
Payer: MEDICARE

## 2019-05-31 VITALS
HEIGHT: 75 IN | HEART RATE: 69 BPM | TEMPERATURE: 97.7 F | OXYGEN SATURATION: 98 % | DIASTOLIC BLOOD PRESSURE: 72 MMHG | WEIGHT: 235 LBS | BODY MASS INDEX: 29.22 KG/M2 | SYSTOLIC BLOOD PRESSURE: 118 MMHG | RESPIRATION RATE: 16 BRPM

## 2019-05-31 DIAGNOSIS — R53.83 FATIGUE, UNSPECIFIED TYPE: Primary | ICD-10-CM

## 2019-05-31 PROCEDURE — 99213 OFFICE O/P EST LOW 20 MIN: CPT | Performed by: NURSE PRACTITIONER
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
## 2019-06-01 ASSESSMENT — ENCOUNTER SYMPTOMS
DIARRHEA: 1
COLOR CHANGE: 0
CHEST TIGHTNESS: 0
COUGH: 0
VOMITING: 0
EYE PAIN: 0
SINUS PAIN: 0
SHORTNESS OF BREATH: 0
CONSTIPATION: 1
WHEEZING: 0
NAUSEA: 0

## 2019-06-05 ENCOUNTER — OFFICE VISIT (OUTPATIENT)
Dept: FAMILY MEDICINE CLINIC | Age: 49
End: 2019-06-05
Payer: MEDICARE

## 2019-06-05 VITALS
HEIGHT: 75 IN | HEART RATE: 59 BPM | BODY MASS INDEX: 29.84 KG/M2 | TEMPERATURE: 98.3 F | DIASTOLIC BLOOD PRESSURE: 70 MMHG | WEIGHT: 240 LBS | SYSTOLIC BLOOD PRESSURE: 116 MMHG | OXYGEN SATURATION: 98 %

## 2019-06-05 DIAGNOSIS — J02.0 ACUTE STREPTOCOCCAL PHARYNGITIS: Primary | ICD-10-CM

## 2019-06-05 DIAGNOSIS — M25.511 CHRONIC RIGHT SHOULDER PAIN: ICD-10-CM

## 2019-06-05 DIAGNOSIS — E55.9 VITAMIN D DEFICIENCY: ICD-10-CM

## 2019-06-05 DIAGNOSIS — M25.552 LEFT HIP PAIN: ICD-10-CM

## 2019-06-05 DIAGNOSIS — R11.0 NAUSEA: ICD-10-CM

## 2019-06-05 DIAGNOSIS — G89.29 CHRONIC RIGHT SHOULDER PAIN: ICD-10-CM

## 2019-06-05 DIAGNOSIS — E29.1 MALE HYPOGONADISM: ICD-10-CM

## 2019-06-05 PROCEDURE — 99214 OFFICE O/P EST MOD 30 MIN: CPT | Performed by: NURSE PRACTITIONER

## 2019-06-05 RX ORDER — ONDANSETRON 4 MG/1
4 TABLET, ORALLY DISINTEGRATING ORAL EVERY 8 HOURS PRN
Qty: 30 TABLET | Refills: 0 | Status: SHIPPED | OUTPATIENT
Start: 2019-06-05 | End: 2020-02-14 | Stop reason: SDUPTHER

## 2019-06-05 RX ORDER — AMOXICILLIN 500 MG/1
500 CAPSULE ORAL 3 TIMES DAILY
Qty: 30 CAPSULE | Refills: 0 | Status: SHIPPED | OUTPATIENT
Start: 2019-06-05 | End: 2019-06-15

## 2019-06-05 RX ORDER — PANTOPRAZOLE SODIUM 40 MG/1
40 TABLET, DELAYED RELEASE ORAL
Qty: 30 TABLET | Refills: 5 | Status: SHIPPED | OUTPATIENT
Start: 2019-06-05 | End: 2019-06-05 | Stop reason: SDUPTHER

## 2019-06-05 RX ORDER — CHOLECALCIFEROL (VITAMIN D3) 50 MCG
2000 TABLET ORAL DAILY
Qty: 90 TABLET | Refills: 3 | Status: SHIPPED
Start: 2019-06-05 | End: 2020-09-22 | Stop reason: ALTCHOICE

## 2019-06-05 RX ORDER — TESTOSTERONE 16.2 MG/G
GEL TRANSDERMAL
Qty: 2 BOTTLE | Refills: 2 | Status: SHIPPED | OUTPATIENT
Start: 2019-06-05 | End: 2019-07-10 | Stop reason: SDUPTHER

## 2019-06-05 RX ORDER — ERGOCALCIFEROL 1.25 MG/1
50000 CAPSULE ORAL WEEKLY
Qty: 12 CAPSULE | Refills: 0 | Status: SHIPPED | OUTPATIENT
Start: 2019-06-05 | End: 2020-01-13

## 2019-06-05 RX ORDER — PANTOPRAZOLE SODIUM 40 MG/1
TABLET, DELAYED RELEASE ORAL
Qty: 90 TABLET | Refills: 5 | Status: SHIPPED | OUTPATIENT
Start: 2019-06-05 | End: 2020-02-14 | Stop reason: SDUPTHER

## 2019-06-05 ASSESSMENT — ENCOUNTER SYMPTOMS
CHEST TIGHTNESS: 0
SINUS PAIN: 1
SHORTNESS OF BREATH: 1
WHEEZING: 0
EYE PAIN: 0
NAUSEA: 1
COUGH: 0
CONSTIPATION: 0
COLOR CHANGE: 0
DIARRHEA: 0
VOMITING: 0

## 2019-06-05 NOTE — TELEPHONE ENCOUNTER
Upon check out, Moe Lockwood asked if you could please give him a referral to a chiropractor, if you could please place the referral.  Thank you

## 2019-06-05 NOTE — PROGRESS NOTES
strain: Not on file    Food insecurity:     Worry: Not on file     Inability: Not on file    Transportation needs:     Medical: Not on file     Non-medical: Not on file   Tobacco Use    Smoking status: Current Every Day Smoker     Packs/day: 0.25     Years: 21.00     Pack years: 5.25     Types: Cigarettes, Pipe    Smokeless tobacco: Never Used    Tobacco comment: black and mild   Substance and Sexual Activity    Alcohol use: Yes     Alcohol/week: 0.0 oz    Drug use: Yes     Frequency: 2.0 times per week     Types: Marijuana     Comment: marijuana- whenever he can get it    Sexual activity: Yes     Partners: Female   Lifestyle    Physical activity:     Days per week: Not on file     Minutes per session: Not on file    Stress: Not on file   Relationships    Social connections:     Talks on phone: Not on file     Gets together: Not on file     Attends Scientologist service: Not on file     Active member of club or organization: Not on file     Attends meetings of clubs or organizations: Not on file     Relationship status: Not on file    Intimate partner violence:     Fear of current or ex partner: Not on file     Emotionally abused: Not on file     Physically abused: Not on file     Forced sexual activity: Not on file   Other Topics Concern    Not on file   Social History Narrative    ** Merged History Encounter **             Allergies   Allergen Reactions    Reglan [Metoclopramide] Other (See Comments)        Prior to Visit Medications    Medication Sig Taking? Authorizing Provider   ondansetron (ZOFRAN ODT) 4 MG disintegrating tablet Take 1 tablet by mouth every 8 hours as needed for Nausea or Vomiting Yes ANGELITO Garcia CNP   Testosterone (ANDROGEL PUMP) 20.25 MG/ACT (1.62%) GEL gel Apply 3 pump press to one upper arm and shoulder and then apply 3  pump press to the opposite upper arm and shoulder.  Yes ANGELITO Garcia CNP   vitamin D (ERGOCALCIFEROL) 87141 units CAPS capsule Take 1 capsule polyuria. Genitourinary: Negative for difficulty urinating. Musculoskeletal: Positive for arthralgias (bilateral hips replaced 5150-9806), joint swelling and myalgias. Skin: Negative for color change, rash and wound. Neurological: Positive for headaches (cluster). Negative for dizziness and light-headedness. Hematological: Negative for adenopathy. Does not bruise/bleed easily. Psychiatric/Behavioral: Positive for sleep disturbance. Negative for agitation, decreased concentration and suicidal ideas. The patient is not nervous/anxious. BP Readings from Last 1 Encounters:   06/05/19 116/70    Recheck if >140/90  Hemoglobin A1C (%)   Date Value   07/18/2014 6.7 (H)     No results found for: LABMICR    Physical Exam:    Vitals:    06/05/19 0905   BP: 116/70   Pulse: 59   Temp: 98.3 °F (36.8 °C)   TempSrc: Oral   SpO2: 98%   Weight: 240 lb (108.9 kg)   Height: 6' 3\" (1.905 m)     Physical Exam   Constitutional: He is oriented to person, place, and time. He appears well-developed and well-nourished. HENT:   Head: Normocephalic and atraumatic. Nasal turbinates are erythematous and boggy. Pharynx shows erythema and PND. Eyes: Pupils are equal, round, and reactive to light. Conjunctivae and EOM are normal.   Neck: Normal range of motion. Neck supple. No JVD present. No tracheal deviation present. Cardiovascular: Normal rate, regular rhythm, normal heart sounds and intact distal pulses. Pulmonary/Chest: Effort normal and breath sounds normal. No respiratory distress. He has no wheezes. Abdominal: Soft. Bowel sounds are normal. He exhibits no distension. Genitourinary:   Genitourinary Comments: Deferred. Musculoskeletal: He exhibits tenderness. Right shoulder: He exhibits decreased range of motion and tenderness. He exhibits no crepitus and normal strength. Right hip: He exhibits decreased strength and tenderness. He exhibits no crepitus.         Left hip: He exhibits decreased range of motion, decreased strength and tenderness. Neurological: He is alert and oriented to person, place, and time. He exhibits normal muscle tone. Skin: Skin is warm and dry. Capillary refill takes less than 2 seconds. Psychiatric: He has a normal mood and affect. His behavior is normal. Thought content normal.   Nursing note and vitals reviewed. Assessment/Plan:    Denia Ramirez was seen today for diabetes and medication refill. Diagnoses and all orders for this visit:    Acute streptococcal pharyngitis  -     amoxicillin (AMOXIL) 500 MG capsule; Take 1 capsule by mouth 3 times daily for 10 days    Nausea  -     ondansetron (ZOFRAN ODT) 4 MG disintegrating tablet; Take 1 tablet by mouth every 8 hours as needed for Nausea or Vomiting  - Denia Ramirez is now following his medication regimen correctly since his EGD. Will continue to monitor his diet and activity and report any differences. Male hypogonadism  -     Testosterone (ANDROGEL PUMP) 20.25 MG/ACT (1.62%) GEL gel; Apply 3 pump press to one upper arm and shoulder and then apply 3  pump press to the opposite upper arm and shoulder. Vitamin D deficiency  -     Discontinue: vitamin D (CHOLECALCIFEROL) 41831 UNIT CAPS; Take 1 capsule weekly  -     vitamin D (ERGOCALCIFEROL) 17978 units CAPS capsule; Take 1 capsule by mouth once a week Take 50,000 IU every Sunday and 2,000 IU everyday except Sunday. -     Cholecalciferol (VITAMIN D) 2000 units TABS tablet; Take 1 tablet by mouth daily    Left hip pain  -     [facility] - [address] right shoulder pain  -     Jayna Dillon DC,Chiropractic Medicine, [place]  has had injury to his shoulder in the past, they did not perform surgery, he reports broken clavicle. Now has pain extending across shoulders and into left arm at times.  Reports chiropractic worked in past.    Greater than 15  Minutes was spent with patient and more than 50% of

## 2019-06-07 ENCOUNTER — OFFICE VISIT (OUTPATIENT)
Dept: CHIROPRACTIC MEDICINE | Age: 49
End: 2019-06-07
Payer: MEDICARE

## 2019-06-07 VITALS
BODY MASS INDEX: 30.21 KG/M2 | WEIGHT: 243 LBS | SYSTOLIC BLOOD PRESSURE: 118 MMHG | HEART RATE: 63 BPM | TEMPERATURE: 95.3 F | HEIGHT: 75 IN | DIASTOLIC BLOOD PRESSURE: 76 MMHG

## 2019-06-07 DIAGNOSIS — M54.04 PANNICULITIS AFFECTING REGIONS OF NECK AND BACK, THORACIC REGION: ICD-10-CM

## 2019-06-07 DIAGNOSIS — M54.41 CHRONIC BILATERAL LOW BACK PAIN WITH BILATERAL SCIATICA: ICD-10-CM

## 2019-06-07 DIAGNOSIS — M54.2 CERVICALGIA: ICD-10-CM

## 2019-06-07 DIAGNOSIS — M99.01 SEGMENTAL AND SOMATIC DYSFUNCTION OF CERVICAL REGION: Primary | ICD-10-CM

## 2019-06-07 DIAGNOSIS — M54.42 CHRONIC BILATERAL LOW BACK PAIN WITH BILATERAL SCIATICA: ICD-10-CM

## 2019-06-07 DIAGNOSIS — G89.29 CHRONIC BILATERAL LOW BACK PAIN WITH BILATERAL SCIATICA: ICD-10-CM

## 2019-06-07 DIAGNOSIS — M54.50 BILATERAL LOW BACK PAIN WITHOUT SCIATICA, UNSPECIFIED CHRONICITY: ICD-10-CM

## 2019-06-07 DIAGNOSIS — M99.03 SEGMENTAL AND SOMATIC DYSFUNCTION OF LUMBAR REGION: ICD-10-CM

## 2019-06-07 DIAGNOSIS — M99.02 SEGMENTAL AND SOMATIC DYSFUNCTION OF THORACIC REGION: ICD-10-CM

## 2019-06-07 PROCEDURE — 99203 OFFICE O/P NEW LOW 30 MIN: CPT | Performed by: CHIROPRACTOR

## 2019-06-07 PROCEDURE — 98941 CHIROPRACT MANJ 3-4 REGIONS: CPT | Performed by: CHIROPRACTOR

## 2019-06-07 ASSESSMENT — ENCOUNTER SYMPTOMS
BACK PAIN: 1
BOWEL INCONTINENCE: 0
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
## 2019-07-10 ENCOUNTER — OFFICE VISIT (OUTPATIENT)
Dept: ENDOCRINOLOGY | Age: 49
End: 2019-07-10
Payer: MEDICARE

## 2019-07-10 VITALS
BODY MASS INDEX: 30.16 KG/M2 | OXYGEN SATURATION: 97 % | DIASTOLIC BLOOD PRESSURE: 84 MMHG | HEIGHT: 75 IN | RESPIRATION RATE: 16 BRPM | WEIGHT: 242.6 LBS | HEART RATE: 72 BPM | SYSTOLIC BLOOD PRESSURE: 130 MMHG

## 2019-07-10 DIAGNOSIS — E03.8 CENTRAL HYPOTHYROIDISM: ICD-10-CM

## 2019-07-10 DIAGNOSIS — E55.9 VITAMIN D DEFICIENCY: ICD-10-CM

## 2019-07-10 DIAGNOSIS — E29.1 MALE HYPOGONADISM: Primary | ICD-10-CM

## 2019-07-10 DIAGNOSIS — D35.2 PITUITARY MACROADENOMA (HCC): ICD-10-CM

## 2019-07-10 DIAGNOSIS — E23.0 PANHYPOPITUITARISM (HCC): ICD-10-CM

## 2019-07-10 DIAGNOSIS — E23.2 DIABETES INSIPIDUS (HCC): ICD-10-CM

## 2019-07-10 LAB
BUN BLDV-MCNC: NORMAL MG/DL
CALCIUM SERPL-MCNC: 9.8 MG/DL
CHLORIDE BLD-SCNC: NORMAL MMOL/L
CO2: NORMAL MMOL/L
CREAT SERPL-MCNC: 1.16 MG/DL
GFR CALCULATED: 60
GLUCOSE BLD-MCNC: NORMAL MG/DL
HCT VFR BLD CALC: 36.2 % (ref 41–53)
POTASSIUM SERPL-SCNC: 4.3 MMOL/L
SEX HORMONE BINDING GLOBULIN: NORMAL
SODIUM BLD-SCNC: 143 MMOL/L
T4 FREE: 0.77
TESTOSTERONE FREE: 113.1
TESTOSTERONE TOTAL: 754
TESTOSTERONE, BIOAVAILABLE: NORMAL

## 2019-07-10 PROCEDURE — G8417 CALC BMI ABV UP PARAM F/U: HCPCS | Performed by: INTERNAL MEDICINE

## 2019-07-10 PROCEDURE — 99215 OFFICE O/P EST HI 40 MIN: CPT | Performed by: INTERNAL MEDICINE

## 2019-07-10 PROCEDURE — G8427 DOCREV CUR MEDS BY ELIG CLIN: HCPCS | Performed by: INTERNAL MEDICINE

## 2019-07-10 PROCEDURE — 4004F PT TOBACCO SCREEN RCVD TLK: CPT | Performed by: INTERNAL MEDICINE

## 2019-07-10 RX ORDER — LEVOTHYROXINE SODIUM 0.15 MG/1
150 TABLET ORAL DAILY
Qty: 90 TABLET | Refills: 5 | Status: SHIPPED | OUTPATIENT
Start: 2019-07-10 | End: 2019-07-11

## 2019-07-10 RX ORDER — TESTOSTERONE 16.2 MG/G
GEL TRANSDERMAL
Qty: 3 BOTTLE | Refills: 2 | Status: SHIPPED | OUTPATIENT
Start: 2019-07-10 | End: 2020-01-13 | Stop reason: SDUPTHER

## 2019-07-10 RX ORDER — DESMOPRESSIN ACETATE 0.2 MG/1
TABLET ORAL
Qty: 225 TABLET | Refills: 3 | Status: SHIPPED | OUTPATIENT
Start: 2019-07-10 | End: 2020-01-13 | Stop reason: SDUPTHER

## 2019-07-10 RX ORDER — HYDROCORTISONE 20 MG/1
TABLET ORAL
Qty: 135 TABLET | Refills: 5 | Status: SHIPPED | OUTPATIENT
Start: 2019-07-10 | End: 2020-01-13 | Stop reason: SDUPTHER

## 2019-07-10 NOTE — PROGRESS NOTES
Nausea or Vomiting 30 tablet 0    Testosterone (ANDROGEL PUMP) 20.25 MG/ACT (1.62%) GEL gel Apply 3 pump press to one upper arm and shoulder and then apply 3  pump press to the opposite upper arm and shoulder. 2 Bottle 2    vitamin D (ERGOCALCIFEROL) 68386 units CAPS capsule Take 1 capsule by mouth once a week Take 50,000 IU every Sunday and 2,000 IU everyday except Sunday. 12 capsule 0    Cholecalciferol (VITAMIN D) 2000 units TABS tablet Take 1 tablet by mouth daily 90 tablet 3    pantoprazole (PROTONIX) 40 MG tablet TAKE 1 TABLET BY MOUTH EVERY MORNING BEFORE BREAKFAST 90 tablet 5    levothyroxine (SYNTHROID) 150 MCG tablet Take 1 tablet by mouth daily 30 tablet 5    fluticasone (FLONASE) 50 MCG/ACT nasal spray SPRAY 2 SPRAYS IN EACH NOSTRIL EVERY DAY 1 Bottle 1    diphenhydrAMINE (BENADRYL) 25 MG tablet Take 25 mg by mouth every 6 hours as needed for Itching      hydrocortisone (CORTEF) 20 MG tablet Take take 1 tablets in am and 1/2 tablet at 3pm 135 tablet 5    desmopressin (DDAVP) 0.2 MG tablet Tale 1 tablet in AM and 1&1/2 tab at bedtime 225 tablet 3    sucralfate (CARAFATE) 1 GM tablet Take 1 tablet by mouth 4 times daily 120 tablet 3     No current facility-administered medications for this visit. Review of Systems  Constitutional: No fever, no chills, no diaphoresis, no generalized weakness. HEENT: No blurred vision, No sore throat, no ear pain, no hair loss  Neck: denied any neck swelling, difficulty swallowing,   Cadrdiopulomary: No CP, SOB or palpitation, No orthopnea or PND. No cough or wheezing. GI: No N/V/D, no constipation, No abdominal pain, no melena or hematochezia   : Denied any dysuria, hematuria, flank pain, discharge, or incontinence. Skin: denied any rash, ulcer, Hirsute, or hyperpigmentation. MSK: denied any joint deformity, joint pain/swelling, muscle pain, or back pain.   Neuro: no numbess, no tingling, no weakness,     OBJECTIVE    /84 (Site: Left Upper Arm, hypothyroidism   · Continue levothyroxine 150 mcg daily. Advised to take levothyroxine in the morning at empty stomach, wait one hour before eating , avoid multivitamins containing calcium  or iron with it. · Will check free T4 and adjust the dose if needed (goal to keep free T4 in mid to upper range of normal)   · Only Free T4 should be used to adjust his thyroid medications    Secondary adrenal insufficiency   · Continue hydrocortisone dose to 20 mg 1 tab AM and 1/2 tab at 3 pm   · Patient need to be on at least maintenance dose of steroid all the time and stress dose for any stressful events. · Discussed the importance of wearing medical alert identification (bracelet, necklace)  · Discussed to need to increase steroid dose by 2-3 times for illness   · If vomiting pt understand the need to go to hospital for iv treatment     Hypogonadotropic hypogonadism   · Continue Androgel 3 pumps to each shoulder  daily  · Check total free Testosterone level. Will also check Hct and PSA   · Discussed all side effects of Testosterone therapy     Central DI   · Feels better   · Continue DDAVP dose to  0.2 mg 1 tab am and 1&1/2 tab at bedtime   · CMP in few days   · Discussed the importance of drinking only to thirst while on DDAVP     Return in about 6 months (around 1/10/2020) for panhypopituitarism . Thank you for allowing us to participate in the care of this patient. Please do not hesitate to contact us with any additional questions. 30 minutes were spent today in management of this patient. More than 50% of time spent on counseling of patient on above diagnosis. Diagnosis Orders   1. Male hypogonadism  Testosterone, free, total    Hematocrit    Testosterone (ANDROGEL PUMP) 20.25 MG/ACT (1.62%) GEL gel   2. Central hypothyroidism  T4, Free    levothyroxine (SYNTHROID) 150 MCG tablet    hydrocortisone (CORTEF) 20 MG tablet    desmopressin (DDAVP) 0.2 MG tablet   3.  Panhypopituitarism [facility])  Basic Metabolic Panel
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
## 2019-07-11 ENCOUNTER — TELEPHONE (OUTPATIENT)
Dept: ENDOCRINOLOGY | Age: 49
End: 2019-07-11

## 2019-07-11 DIAGNOSIS — E23.0 PANHYPOPITUITARISM (HCC): ICD-10-CM

## 2019-07-11 DIAGNOSIS — E29.1 MALE HYPOGONADISM: ICD-10-CM

## 2019-07-11 DIAGNOSIS — E55.9 VITAMIN D DEFICIENCY: ICD-10-CM

## 2019-07-11 DIAGNOSIS — E03.8 CENTRAL HYPOTHYROIDISM: Primary | ICD-10-CM

## 2019-07-11 DIAGNOSIS — E03.8 CENTRAL HYPOTHYROIDISM: ICD-10-CM

## 2019-07-11 RX ORDER — LEVOTHYROXINE SODIUM 175 UG/1
175 TABLET ORAL DAILY
Qty: 90 TABLET | Refills: 3 | Status: SHIPPED | OUTPATIENT
Start: 2019-07-11 | End: 2020-01-13 | Stop reason: SDUPTHER

## 2019-07-15 DIAGNOSIS — E23.0 PANHYPOPITUITARISM (HCC): ICD-10-CM

## 2019-07-15 DIAGNOSIS — E29.1 MALE HYPOGONADISM: ICD-10-CM

## 2019-07-17 ENCOUNTER — TELEPHONE (OUTPATIENT)
Dept: ENDOCRINOLOGY | Age: 49
End: 2019-07-17

## 2019-10-10 ENCOUNTER — TELEPHONE (OUTPATIENT)
Dept: ENDOCRINOLOGY | Age: 49
End: 2019-10-10

## 2019-10-10 DIAGNOSIS — Z01.812 ENCOUNTER FOR PREPROCEDURAL LABORATORY EXAMINATION: Primary | ICD-10-CM

## 2019-10-14 ENCOUNTER — HOSPITAL ENCOUNTER (OUTPATIENT)
Age: 49
Discharge: HOME | End: 2019-10-14
Payer: MEDICARE

## 2019-10-14 DIAGNOSIS — Z01.812 ENCOUNTER FOR PREPROCEDURAL LABORATORY EXAMINATION: ICD-10-CM

## 2019-10-14 DIAGNOSIS — E23.0 PANHYPOPITUITARISM (HCC): ICD-10-CM

## 2019-10-14 DIAGNOSIS — E29.1 MALE HYPOGONADISM: ICD-10-CM

## 2019-10-14 DIAGNOSIS — E55.9 VITAMIN D DEFICIENCY: ICD-10-CM

## 2019-10-14 LAB
ALBUMIN SERPL-MCNC: 4.9 G/DL (ref 3.5–5.2)
ALP BLD-CCNC: 49 U/L (ref 40–129)
ALT SERPL-CCNC: 20 U/L (ref 0–40)
ANION GAP SERPL CALCULATED.3IONS-SCNC: 13 MMOL/L (ref 7–16)
AST SERPL-CCNC: 18 U/L (ref 0–39)
BILIRUB SERPL-MCNC: 0.2 MG/DL (ref 0–1.2)
BUN BLDV-MCNC: 17 MG/DL (ref 6–20)
CALCIUM SERPL-MCNC: 9.6 MG/DL (ref 8.6–10.2)
CHLORIDE BLD-SCNC: 101 MMOL/L (ref 98–107)
CO2: 24 MMOL/L (ref 22–29)
CREAT SERPL-MCNC: 1.1 MG/DL (ref 0.7–1.2)
GFR AFRICAN AMERICAN: >60
GFR NON-AFRICAN AMERICAN: >60 ML/MIN/1.73
GLUCOSE BLD-MCNC: 99 MG/DL (ref 74–99)
POTASSIUM SERPL-SCNC: 3.9 MMOL/L (ref 3.5–5)
SODIUM BLD-SCNC: 138 MMOL/L (ref 132–146)
T4 FREE: 1 NG/DL (ref 0.93–1.7)
TOTAL PROTEIN: 8.1 G/DL (ref 6.4–8.3)
VITAMIN D 25-HYDROXY: 30 NG/ML (ref 30–100)

## 2019-10-14 PROCEDURE — 84403 ASSAY OF TOTAL TESTOSTERONE: CPT

## 2019-10-14 PROCEDURE — 82306 VITAMIN D 25 HYDROXY: CPT

## 2019-10-14 PROCEDURE — 84270 ASSAY OF SEX HORMONE GLOBUL: CPT

## 2019-10-14 PROCEDURE — 84439 ASSAY OF FREE THYROXINE: CPT

## 2019-10-14 PROCEDURE — 80053 COMPREHEN METABOLIC PANEL: CPT

## 2019-10-14 PROCEDURE — 36415 COLL VENOUS BLD VENIPUNCTURE: CPT

## 2019-10-16 LAB
SEX HORMONE BINDING GLOBULIN: 47 NMOL/L (ref 11–80)
TESTOSTERONE FREE-NONMALE: 10.3 PG/ML (ref 47–244)
TESTOSTERONE TOTAL: 71 NG/DL (ref 220–1000)

## 2019-10-20 ENCOUNTER — TELEPHONE (OUTPATIENT)
Dept: ENDOCRINOLOGY | Age: 49
End: 2019-10-20

## 2020-01-13 ENCOUNTER — OFFICE VISIT (OUTPATIENT)
Dept: ENDOCRINOLOGY | Age: 50
End: 2020-01-13
Payer: MEDICARE

## 2020-01-13 VITALS
RESPIRATION RATE: 16 BRPM | WEIGHT: 236.4 LBS | BODY MASS INDEX: 29.39 KG/M2 | DIASTOLIC BLOOD PRESSURE: 70 MMHG | HEART RATE: 68 BPM | OXYGEN SATURATION: 98 % | SYSTOLIC BLOOD PRESSURE: 126 MMHG | HEIGHT: 75 IN

## 2020-01-13 PROCEDURE — G8427 DOCREV CUR MEDS BY ELIG CLIN: HCPCS | Performed by: INTERNAL MEDICINE

## 2020-01-13 PROCEDURE — 4004F PT TOBACCO SCREEN RCVD TLK: CPT | Performed by: INTERNAL MEDICINE

## 2020-01-13 PROCEDURE — G8484 FLU IMMUNIZE NO ADMIN: HCPCS | Performed by: INTERNAL MEDICINE

## 2020-01-13 PROCEDURE — 99214 OFFICE O/P EST MOD 30 MIN: CPT | Performed by: INTERNAL MEDICINE

## 2020-01-13 PROCEDURE — G8417 CALC BMI ABV UP PARAM F/U: HCPCS | Performed by: INTERNAL MEDICINE

## 2020-01-13 RX ORDER — LEVOTHYROXINE SODIUM 175 UG/1
175 TABLET ORAL DAILY
Qty: 90 TABLET | Refills: 1 | Status: SHIPPED | OUTPATIENT
Start: 2020-01-13 | End: 2020-02-14 | Stop reason: SDUPTHER

## 2020-01-13 RX ORDER — DESMOPRESSIN ACETATE 0.2 MG/1
TABLET ORAL
Qty: 225 TABLET | Refills: 3 | Status: SHIPPED | OUTPATIENT
Start: 2020-01-13 | End: 2020-02-14 | Stop reason: SDUPTHER

## 2020-01-13 RX ORDER — HYDROCORTISONE 20 MG/1
TABLET ORAL
Qty: 135 TABLET | Refills: 3 | Status: SHIPPED
Start: 2020-01-13 | End: 2021-01-18

## 2020-01-13 RX ORDER — TESTOSTERONE 16.2 MG/G
GEL TRANSDERMAL
Qty: 3 BOTTLE | Refills: 2 | Status: SHIPPED | OUTPATIENT
Start: 2020-01-13 | End: 2020-06-08 | Stop reason: SDUPTHER

## 2020-01-13 NOTE — PROGRESS NOTES
ENDOCRINOLOGY CLINIC NOTE    Date of Service: 1/13/2020    Primary Care Physician: ANGELITO Chavez CNP. Provider: Jessica Lora MD      Reason for the visit:  Recurrent Pituitary adenoma s/p resection followed by gamma knife    History of Present Illness: The history is provided by the patient. No  was used. Accuracy of the patient data is excellent. Rolf Fontana is a very pleasant 52 y.o. male with past medical history of recurrent Pituitary adenoma s/p resection followed by gamma knife complicated seen today for follow up visit     In 2007, the patient was diagnosed with Pituitary adenoma when he developed visual symptoms. Transphenoidal Pituitary resection was performed by Dr. Joyce Yousif at [facility]. Patient developed Panhypopituitarism and was treated with levothyroxine, hydrocortisone, DDAVP and testosterone. In 2012, patient presented again with right eye pain and decreased peripheral vision along with fatigue. MRI Pituitary showed enhancing sellar/suprasellar mass invading into the right cavernous  sinus, encasing the right internal carotid artery and exerting mass effect upon the right optic nerve. He underwent repeat tumor removal followed by gamma knife procedure for residual tumor removal at [facility] by Dr Uzma Bartholomew. He was continued on hormone replacement as above. Patient is currently on :   --Hydrocortisone 20 mg po 1 tab QAM and 1/2 mg tab at 3 pm   -- DDAVP 0.2 mg 1 tab qam and 1&1/2 tab at bedtime   --Levothyroxine 175 mcg daily   --Androgel 1% 3 pumps on each shoulder every morning     Testosterone was 754 in July/2019.  Over the past 4 months pt was only taking 1 pump to each shoulder because he wasn't get enough refills from his pharmacy   Recent lab 10/14/2019 --> Testosterone was 71     The patient denied significant polyuria, light headedness, Slight cold intolerance, weight changes    10/14/2019 - Na 138, K 3.9, free T4 1.0, Total MEDICATIONS     Current Outpatient Medications   Medication Sig Dispense Refill    levothyroxine (SYNTHROID) 175 MCG tablet Take 1 tablet by mouth daily 90 tablet 3    Testosterone (ANDROGEL PUMP) 20.25 MG/ACT (1.62%) GEL gel Apply 3 pump press to one upper arm and shoulder and then apply 3  pump press to the opposite upper arm and shoulder. 3 Bottle 2    hydrocortisone (CORTEF) 20 MG tablet Take take 1 tablets in am and 1/2 tablet at 3pm 135 tablet 5    desmopressin (DDAVP) 0.2 MG tablet Tale 1 tablet in AM and 1&1/2 tab at bedtime 225 tablet 3    ondansetron (ZOFRAN ODT) 4 MG disintegrating tablet Take 1 tablet by mouth every 8 hours as needed for Nausea or Vomiting 30 tablet 0    Cholecalciferol (VITAMIN D) 2000 units TABS tablet Take 1 tablet by mouth daily 90 tablet 3    pantoprazole (PROTONIX) 40 MG tablet TAKE 1 TABLET BY MOUTH EVERY MORNING BEFORE BREAKFAST 90 tablet 5    diphenhydrAMINE (BENADRYL) 25 MG tablet Take 25 mg by mouth every 6 hours as needed for Itching      sucralfate (CARAFATE) 1 GM tablet Take 1 tablet by mouth 4 times daily 120 tablet 3    vitamin D (ERGOCALCIFEROL) 34778 units CAPS capsule Take 1 capsule by mouth once a week Take 50,000 IU every Sunday and 2,000 IU everyday except Sunday. (Patient not taking: Reported on 1/13/2020) 12 capsule 0     No current facility-administered medications for this visit. Review of Systems  Constitutional: No fever, no chills, no diaphoresis, no generalized weakness. HEENT: No blurred vision, No sore throat, no ear pain, no hair loss  Neck: denied any neck swelling, difficulty swallowing,   Cadrdiopulomary: No CP, SOB or palpitation, No orthopnea or PND. No cough or wheezing. GI: No N/V/D, no constipation, No abdominal pain, no melena or hematochezia   : Denied any dysuria, hematuria, flank pain, discharge, or incontinence. Skin: denied any rash, ulcer, Hirsute, or hyperpigmentation.    MSK: denied any joint deformity, joint pain/swelling, muscle pain, or back pain. Neuro: no numbess, no tingling, no weakness,     OBJECTIVE    /70 (Site: Left Upper Arm, Position: Sitting, Cuff Size: Medium Adult)   Pulse 68   Resp 16   Ht 6' 3\" (1.905 m)   Wt 236 lb 6.4 oz (107.2 kg)   SpO2 98%   BMI 29.55 kg/m²   BP Readings from Last 4 Encounters:   01/13/20 126/70   07/10/19 130/84   06/07/19 118/76   06/05/19 116/70     Wt Readings from Last 6 Encounters:   01/13/20 236 lb 6.4 oz (107.2 kg)   07/10/19 242 lb 9.6 oz (110 kg)   06/07/19 243 lb (110.2 kg)   06/05/19 240 lb (108.9 kg)   05/31/19 235 lb (106.6 kg)   03/08/19 244 lb (110.7 kg)       Physical examination:  General: awake alert, oriented x3, no abnormal position or movements. HEENT: normocephalic non traumatic,   Neck: supple, no LN enlargement, no thyromegaly, no thyroid tenderness, No buffalo hump, no supraventricular fullness,   Pulm: Clear equal air entry no added sounds, no wheezing or rhonchi    CVS: S1 + S2, no murmur, no heave. Dorsalis pedis pulse palpable   Abd: soft lax, no tenderness, no organomegaly, audible bowel sounds. Skin: warm, no lesions, no rash. no skin tags,  Neuro: CN intact, sensation normal , muscle strength normal. No tremors   Psych: normal mood, and affect    Review of Laboratory Data:  I have reviewed the following:  Lab Results   Component Value Date/Time    WBC 9.1 03/25/2019 09:15 AM    RBC 4.07 03/25/2019 09:15 AM    HGB 12.3 (L) 03/25/2019 09:15 AM    HCT 36.2 (A) 07/10/2019    MCV 94.8 03/25/2019 09:15 AM    MCH 30.2 03/25/2019 09:15 AM    MCHC 31.9 (L) 03/25/2019 09:15 AM    RDW 14.7 03/25/2019 09:15 AM     03/25/2019 09:15 AM    MPV 10.6 03/25/2019 09:15 AM      Lab Results   Component Value Date/Time     10/14/2019 01:31 PM    K 3.9 10/14/2019 01:31 PM    K 3.7 02/27/2019 08:22 PM    CO2 24 10/14/2019 01:31 PM    BUN 17 10/14/2019 01:31 PM    CALCIUM 9.6 10/14/2019 01:31 PM      Lab Results   Component Value Date/Time    TSH <0.004 (L) 07/12/2013 12:42 PM    T4FREE 1.00 10/14/2019 01:31 PM    P9JONGA 4.0 (L) 03/30/2011 12:55 AM    FT3 2.5 03/04/2013 11:41 AM     Lab Results   Component Value Date    CORTISOL 41.25 07/17/2014    CORTISOL 0.90 03/30/2011     Lab Results   Component Value Date    ACTH 6 03/30/2011     No results found for: DHEAS  No results found for: IGF1  No results found for: [address]  No results found for: [facility]  No results found for: [facility]  Lab Results   Component Value Date    TSH <0.004 07/12/2013    TSH 0.015 03/30/2011     No results found for: FREET4  Lab Results   Component Value Date    TESTOSTERONE 71 10/14/2019    TESTOSTERONE 754 07/10/2019    TESTOSTERONE 270 03/25/2019    TESTOSTERONE 115 03/04/2013     Lab Results   Component Value Date    FTES 113.1 07/10/2019     No results found for: TESTFC  Lab Results   Component Value Date    TESTM <3 02/18/2014    TESTM <3 07/12/2013     Lab Results   Component Value Date    SHBG 47 10/14/2019    SHBG 47 03/25/2019    SHBG 29 02/18/2014    SHBG 23 07/12/2013     Lab Results   Component Value Date    PROLACTIN 0.05 03/25/2019    PROLACTIN <0.3 03/04/2013    PROLACTIN <0.3 03/30/2011     No results found for: LABCORT  No results found for: CORTUFRCRT  No results found for: CORTFRUTIME  No results found for: [id number]  No results found for: Gianni Hood  Lab Results   Component Value Date/Time    TSH <0.004 (L) 07/12/2013 12:42 PM    T4FREE 1.00 10/14/2019 01:31 PM    K6FBILN 4.0 (L) 03/30/2011 12:55 AM    FT3 2.5 03/04/2013 11:41 AM     No results found for: PTH  Lab Results   Component Value Date/Time    VITD25 30 10/14/2019 01:31 PM     Medical Records/Labs/Images review:   I personally reviewed and summarized previous records   All labs and imaging studies  were reviewed independently     [address] [facility], a 52 y.o.-old male seen in for following issues      H/o pituitary macroadenoma  · S/p Transphenoidal Pituitary resection in 2007 and gamma knife ration in 2012   · Last Pituitary MRI 8/2017 --> possible small volume residual tumor along the inferior margin of the horizontal cavernous right ICA  · Repeat Pituitary MRI in few weeks     Central hypothyroidism   · Continue levothyroxine 175 mcg daily. Advised to take levothyroxine in the morning at empty stomach, wait one hour before eating , avoid multivitamins containing calcium  or iron with it. · Will continue monitoring with Free T4   · Only Free T4 should be used to adjust his thyroid medications    Secondary adrenal insufficiency   · Continue hydrocortisone dose to 20 mg 1 tab AM and 1/2 tab at 3 pm   · Patient need to be on at least maintenance dose of steroid all the time and stress dose for any stressful events. · Discussed the importance of wearing medical alert identification (bracelet, necklace)  · Discussed to need to increase steroid dose by 2-3 times for illness   · If vomiting pt understand the need to go to hospital for iv treatment     Hypogonadotropic hypogonadism   · Continue Androgel 3 pumps to each shoulder daily. Will call pharmacy with enough refills   · Check total free Testosterone level again in 4/2020   · Discussed all side effects of Testosterone therapy     Central DI   · Feels better on current dose. Na 138   · Continue DDAVP dose to  0.2 mg 1 tab am and 1&1/2 tab at bedtime   · Discussed the importance of drinking only to thirst while on DDAVP     Return in about 6 months (around 7/13/2020) for Panhypopituitarism . Thank you for allowing us to participate in the care of this patient. Please do not hesitate to contact us with any additional questions. Diagnosis Orders   1. H/O: pituitary tumor  MRI PITUITARY W WO CONTRAST   2. Male hypogonadism  Testosterone (ANDROGEL PUMP) 20.25 MG/ACT (1.62%) GEL gel    Testosterone, free, total   3.  Panhypopituitarism (HCC)  desmopressin (DDAVP) 0.2 MG tablet    hydrocortisone (CORTEF) 20 MG tablet    MRI PITUITARY W WO CONTRAST 4. Central hypothyroidism  desmopressin (DDAVP) 0.2 MG tablet    hydrocortisone (CORTEF) 20 MG tablet    levothyroxine (SYNTHROID) 175 MCG tablet   5.  [facility]  desmopressin (DDAVP) 0.2 MG tablet       Rabia Montero MD  Endocrinologist, [facility])   [address] [facility] [address]   Phone: [phone]  Fax: [phone]

## 2020-01-27 ENCOUNTER — TELEPHONE (OUTPATIENT)
Dept: ENDOCRINOLOGY | Age: 50
End: 2020-01-27

## 2020-02-10 ENCOUNTER — HOSPITAL ENCOUNTER (OUTPATIENT)
Age: 50
Discharge: HOME | End: 2020-02-10
Payer: MEDICARE

## 2020-02-10 ENCOUNTER — HOSPITAL ENCOUNTER (OUTPATIENT)
Dept: MRI IMAGING | Age: 50
Discharge: HOME | End: 2020-02-12
Payer: MEDICARE

## 2020-02-10 LAB
ANION GAP SERPL CALCULATED.3IONS-SCNC: 12 MMOL/L (ref 7–16)
BUN BLDV-MCNC: 13 MG/DL (ref 6–20)
CALCIUM SERPL-MCNC: 9.5 MG/DL (ref 8.6–10.2)
CHLORIDE BLD-SCNC: 102 MMOL/L (ref 98–107)
CO2: 25 MMOL/L (ref 22–29)
CREAT SERPL-MCNC: 1 MG/DL (ref 0.7–1.2)
GFR AFRICAN AMERICAN: >60
GFR NON-AFRICAN AMERICAN: >60 ML/MIN/1.73
GLUCOSE BLD-MCNC: 87 MG/DL (ref 74–99)
POTASSIUM SERPL-SCNC: 4 MMOL/L (ref 3.5–5)
SODIUM BLD-SCNC: 139 MMOL/L (ref 132–146)

## 2020-02-10 PROCEDURE — 80048 BASIC METABOLIC PNL TOTAL CA: CPT

## 2020-02-10 PROCEDURE — 84403 ASSAY OF TOTAL TESTOSTERONE: CPT

## 2020-02-10 PROCEDURE — 36415 COLL VENOUS BLD VENIPUNCTURE: CPT

## 2020-02-10 PROCEDURE — 84270 ASSAY OF SEX HORMONE GLOBUL: CPT
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
## 2020-02-13 ENCOUNTER — TELEPHONE (OUTPATIENT)
Dept: ENDOCRINOLOGY | Age: 50
End: 2020-02-13

## 2020-02-13 LAB
SEX HORMONE BINDING GLOBULIN: 60 NMOL/L (ref 11–80)
TESTOSTERONE FREE-NONMALE: 187.3 PG/ML (ref 47–244)
TESTOSTERONE TOTAL: 1120 NG/DL (ref 220–1000)

## 2020-02-14 ENCOUNTER — OFFICE VISIT (OUTPATIENT)
Dept: FAMILY MEDICINE CLINIC | Age: 50
End: 2020-02-14
Payer: MEDICARE

## 2020-02-14 VITALS
HEART RATE: 80 BPM | RESPIRATION RATE: 14 BRPM | OXYGEN SATURATION: 98 % | DIASTOLIC BLOOD PRESSURE: 74 MMHG | BODY MASS INDEX: 28.85 KG/M2 | SYSTOLIC BLOOD PRESSURE: 120 MMHG | TEMPERATURE: 98.2 F | HEIGHT: 75 IN | WEIGHT: 232 LBS

## 2020-02-14 PROCEDURE — G8484 FLU IMMUNIZE NO ADMIN: HCPCS | Performed by: NURSE PRACTITIONER

## 2020-02-14 PROCEDURE — G0438 PPPS, INITIAL VISIT: HCPCS | Performed by: NURSE PRACTITIONER

## 2020-02-14 RX ORDER — LEVOTHYROXINE SODIUM 175 UG/1
175 TABLET ORAL DAILY
Qty: 90 TABLET | Refills: 1 | Status: SHIPPED
Start: 2020-02-14 | End: 2020-09-24

## 2020-02-14 RX ORDER — LORATADINE 10 MG/1
10 TABLET ORAL DAILY
Qty: 90 TABLET | Refills: 1 | Status: SHIPPED
Start: 2020-02-14 | End: 2021-12-23 | Stop reason: SDUPTHER

## 2020-02-14 RX ORDER — PANTOPRAZOLE SODIUM 40 MG/1
40 TABLET, DELAYED RELEASE ORAL DAILY
Qty: 90 TABLET | Refills: 1 | Status: SHIPPED
Start: 2020-02-14 | End: 2020-02-25

## 2020-02-14 RX ORDER — CHOLECALCIFEROL (VITAMIN D3) 50 MCG
2000 TABLET ORAL DAILY
Qty: 90 TABLET | Refills: 3 | Status: CANCELLED | OUTPATIENT
Start: 2020-02-14 | End: 2020-05-14

## 2020-02-14 RX ORDER — ONDANSETRON 4 MG/1
4 TABLET, ORALLY DISINTEGRATING ORAL EVERY 8 HOURS PRN
Qty: 30 TABLET | Refills: 0 | Status: SHIPPED
Start: 2020-02-14 | End: 2020-06-05 | Stop reason: SDUPTHER

## 2020-02-14 RX ORDER — DESMOPRESSIN ACETATE 0.2 MG/1
TABLET ORAL
Qty: 225 TABLET | Refills: 3 | Status: SHIPPED
Start: 2020-02-14 | End: 2021-03-08

## 2020-02-14 RX ORDER — SUCRALFATE 1 G/1
1 TABLET ORAL 2 TIMES DAILY
Qty: 120 TABLET | Refills: 3 | Status: SHIPPED
Start: 2020-02-14 | End: 2021-12-23 | Stop reason: SDUPTHER

## 2020-02-14 RX ORDER — FLUTICASONE PROPIONATE 50 MCG
2 SPRAY, SUSPENSION (ML) NASAL DAILY
Qty: 1 BOTTLE | Refills: 3 | Status: SHIPPED
Start: 2020-02-14 | End: 2020-09-22 | Stop reason: ALTCHOICE

## 2020-02-14 RX ORDER — HYDROCORTISONE 20 MG/1
TABLET ORAL
Qty: 135 TABLET | Refills: 3 | Status: CANCELLED | OUTPATIENT
Start: 2020-02-14

## 2020-02-14 RX ORDER — AZITHROMYCIN 250 MG/1
250 TABLET, FILM COATED ORAL SEE ADMIN INSTRUCTIONS
Qty: 6 TABLET | Refills: 0 | Status: SHIPPED | OUTPATIENT
Start: 2020-02-14 | End: 2020-02-19

## 2020-02-14 ASSESSMENT — ENCOUNTER SYMPTOMS
SINUS PAIN: 1
DIARRHEA: 0
NAUSEA: 0
CHEST TIGHTNESS: 0
CONSTIPATION: 0
VOMITING: 0
SINUS PRESSURE: 1
COUGH: 0
EYE PAIN: 0
WHEEZING: 0
SHORTNESS OF BREATH: 0
COLOR CHANGE: 0

## 2020-02-14 ASSESSMENT — LIFESTYLE VARIABLES: HOW OFTEN DO YOU HAVE A DRINK CONTAINING ALCOHOL: 0

## 2020-02-14 ASSESSMENT — PATIENT HEALTH QUESTIONNAIRE - PHQ9
SUM OF ALL RESPONSES TO PHQ QUESTIONS 1-9: 0
SUM OF ALL RESPONSES TO PHQ QUESTIONS 1-9: 0

## 2020-02-14 NOTE — PROGRESS NOTES
Encounters:   02/14/20 232 lb (105.2 kg)   01/13/20 236 lb 6.4 oz (107.2 kg)   07/10/19 242 lb 9.6 oz (110 kg)     Vitals:    02/14/20 1053   BP: 120/74   Pulse: 80   Resp: 14   Temp: 98.2 °F (36.8 °C)   SpO2: 98%   Weight: 232 lb (105.2 kg)   Height: 6' 3\" (1.905 m)     Body mass index is 29 kg/m². Based upon direct observation of the patient, evaluation of cognition reveals recent and remote memory intact. Review of Systems   Constitutional: Negative for activity change, appetite change, chills and fever. HENT: Positive for congestion, sinus pressure and sinus pain. Negative for ear pain and sneezing. Eyes: Negative for pain and visual disturbance. Respiratory: Negative for cough, chest tightness, shortness of breath and wheezing. Cardiovascular: Negative for chest pain, palpitations and leg swelling. Gastrointestinal: Negative for constipation, diarrhea, nausea and vomiting. Endocrine: Negative for cold intolerance, heat intolerance and polyuria. Genitourinary: Negative for difficulty urinating. Musculoskeletal: Negative for arthralgias and myalgias. Skin: Negative for color change and rash. Neurological: Negative for dizziness, light-headedness and headaches. Hematological: Negative for adenopathy. Does not bruise/bleed easily. Psychiatric/Behavioral: Negative for agitation, decreased concentration, sleep disturbance and suicidal ideas. The patient is not nervous/anxious. Patient's complete Health Risk Assessment and screening values have been reviewed and are found in Flowsheets. The following problems were reviewed today and where indicated follow up appointments were made and/or referrals ordered.     Positive Risk Factor Screenings with Interventions:     Substance Abuse:  Social History     Tobacco History     Smoking Status  Current Every Day Smoker Smoking Frequency  0.25 packs/day for 21 years (5.25 pk yrs) Smoking Tobacco Type  Cigarettes, Pipe    Smokeless Tobacco

## 2020-02-14 NOTE — PATIENT INSTRUCTIONS
Personalized Preventive Plan for Anastasiya Schwartz - 2/14/2020  Medicare offers a range of preventive health benefits. Some of the tests and screenings are paid in full while other may be subject to a deductible, co-insurance, and/or copay. Some of these benefits include a comprehensive review of your medical history including lifestyle, illnesses that may run in your family, and various assessments and screenings as appropriate. After reviewing your medical record and screening and assessments performed today your provider may have ordered immunizations, labs, imaging, and/or referrals for you. A list of these orders (if applicable) as well as your Preventive Care list are included within your After Visit Summary for your review. Other Preventive Recommendations:    · A preventive eye exam performed by an eye specialist is recommended every 1-2 years to screen for glaucoma; cataracts, macular degeneration, and other eye disorders. · A preventive dental visit is recommended every 6 months. · Try to get at least 150 minutes of exercise per week or 10,000 steps per day on a pedometer . · Order or download the FREE \"Exercise & Physical Activity: Your Everyday Guide\" from [employer]. Call 4-178.107.8442 or search [employer] online. · You need 3667-1594 mg of calcium and 9458-5808 IU of vitamin D per day. It is possible to meet your calcium requirement with diet alone, but a vitamin D supplement is usually necessary to meet this goal.  · When exposed to the sun, use a sunscreen that protects against both UVA and UVB radiation with an SPF of 30 or greater. Reapply every 2 to 3 hours or after sweating, drying off with a towel, or swimming. · Always wear a seat belt when traveling in a car. Always wear a helmet when riding a bicycle or motorcycle.

## 2020-02-14 NOTE — TELEPHONE ENCOUNTER
Notify pt,  I have reviewed your recent results    Testosterone level was markedly elevated     Confirm he is currently on Androgel 3 pumps to each shoulder every morning     If so, change to 2 pumps to each should every morning     Will repeat labs again few weeks before next visit

## 2020-02-25 RX ORDER — PANTOPRAZOLE SODIUM 40 MG/1
TABLET, DELAYED RELEASE ORAL
Qty: 90 TABLET | Refills: 0 | Status: SHIPPED
Start: 2020-02-25 | End: 2020-05-04 | Stop reason: SDUPTHER

## 2020-02-25 NOTE — TELEPHONE ENCOUNTER
Last Appointment:  2/14/2020  Future Appointments   Date Time Provider Hailee Mares   5/14/2020 11:30 AM ANGELITO Chavez - CNP [facility]   7/13/2020 11:00 AM Massiel White MD [facility]

## 2020-03-16 RX ORDER — PANTOPRAZOLE SODIUM 40 MG/1
TABLET, DELAYED RELEASE ORAL
Qty: 90 TABLET | Refills: 0 | OUTPATIENT
Start: 2020-03-16

## 2020-03-16 NOTE — TELEPHONE ENCOUNTER
THis was just filled & has 90 days supply. Please advise patient of this & this is what we are trying to cut back on.

## 2020-03-18 ENCOUNTER — HOSPITAL ENCOUNTER (OUTPATIENT)
Age: 50
Discharge: HOME | End: 2020-03-20
Payer: MEDICARE

## 2020-03-18 ENCOUNTER — TELEPHONE (OUTPATIENT)
Dept: FAMILY MEDICINE CLINIC | Age: 50
End: 2020-03-18

## 2020-03-18 ENCOUNTER — OFFICE VISIT (OUTPATIENT)
Dept: FAMILY MEDICINE CLINIC | Age: 50
End: 2020-03-18
Payer: MEDICARE

## 2020-03-18 VITALS
WEIGHT: 227 LBS | BODY MASS INDEX: 28.23 KG/M2 | TEMPERATURE: 97.5 F | OXYGEN SATURATION: 96 % | DIASTOLIC BLOOD PRESSURE: 82 MMHG | SYSTOLIC BLOOD PRESSURE: 112 MMHG | HEART RATE: 88 BPM | HEIGHT: 75 IN | RESPIRATION RATE: 16 BRPM

## 2020-03-18 LAB
APPEARANCE FLUID: ABNORMAL
BILIRUBIN, POC: NEGATIVE
BLOOD URINE, POC: ABNORMAL
CLARITY, POC: ABNORMAL
COLOR, POC: YELLOW
GLUCOSE URINE, POC: NEGATIVE
KETONES, POC: NEGATIVE
LEUKOCYTE EST, POC: NEGATIVE
NITRITE, POC: NEGATIVE
PH, POC: 6
PROTEIN, POC: NEGATIVE
SPECIFIC GRAVITY, POC: 1.02
UROBILINOGEN, POC: 0.2

## 2020-03-18 PROCEDURE — 4004F PT TOBACCO SCREEN RCVD TLK: CPT | Performed by: PHYSICIAN ASSISTANT

## 2020-03-18 PROCEDURE — 96372 THER/PROPH/DIAG INJ SC/IM: CPT | Performed by: PHYSICIAN ASSISTANT

## 2020-03-18 PROCEDURE — G8417 CALC BMI ABV UP PARAM F/U: HCPCS | Performed by: PHYSICIAN ASSISTANT

## 2020-03-18 PROCEDURE — G8427 DOCREV CUR MEDS BY ELIG CLIN: HCPCS | Performed by: PHYSICIAN ASSISTANT

## 2020-03-18 PROCEDURE — G8484 FLU IMMUNIZE NO ADMIN: HCPCS | Performed by: PHYSICIAN ASSISTANT

## 2020-03-18 PROCEDURE — 99214 OFFICE O/P EST MOD 30 MIN: CPT | Performed by: PHYSICIAN ASSISTANT

## 2020-03-18 PROCEDURE — 81002 URINALYSIS NONAUTO W/O SCOPE: CPT | Performed by: PHYSICIAN ASSISTANT

## 2020-03-18 PROCEDURE — 87088 URINE BACTERIA CULTURE: CPT

## 2020-03-18 RX ORDER — KETOROLAC TROMETHAMINE 30 MG/ML
30 INJECTION, SOLUTION INTRAMUSCULAR; INTRAVENOUS ONCE
Status: COMPLETED | OUTPATIENT
Start: 2020-03-18 | End: 2020-03-18

## 2020-03-18 RX ORDER — TAMSULOSIN HYDROCHLORIDE 0.4 MG/1
0.4 CAPSULE ORAL DAILY
Qty: 7 CAPSULE | Refills: 0 | Status: SHIPPED
Start: 2020-03-18 | End: 2020-06-04 | Stop reason: ALTCHOICE

## 2020-03-18 RX ORDER — KETOROLAC TROMETHAMINE 30 MG/ML
60 INJECTION, SOLUTION INTRAMUSCULAR; INTRAVENOUS ONCE
Status: DISCONTINUED | OUTPATIENT
Start: 2020-03-18 | End: 2020-03-18

## 2020-03-18 RX ORDER — CIPROFLOXACIN 500 MG/1
500 TABLET, FILM COATED ORAL 2 TIMES DAILY
Qty: 14 TABLET | Refills: 0 | Status: SHIPPED | OUTPATIENT
Start: 2020-03-18 | End: 2020-03-25

## 2020-03-18 RX ORDER — ONDANSETRON 4 MG/1
4 TABLET, FILM COATED ORAL 3 TIMES DAILY PRN
Qty: 15 TABLET | Refills: 0 | Status: SHIPPED
Start: 2020-03-18 | End: 2020-09-22 | Stop reason: SDUPTHER

## 2020-03-18 RX ADMIN — KETOROLAC TROMETHAMINE 30 MG: 30 INJECTION, SOLUTION INTRAMUSCULAR; INTRAVENOUS at 17:09
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
## 2020-03-21 LAB — URINE CULTURE, ROUTINE: NORMAL

## 2020-05-04 RX ORDER — PANTOPRAZOLE SODIUM 40 MG/1
40 TABLET, DELAYED RELEASE ORAL DAILY
Qty: 90 TABLET | Refills: 1 | Status: SHIPPED
Start: 2020-05-04 | End: 2020-06-05 | Stop reason: SDUPTHER

## 2020-05-04 NOTE — TELEPHONE ENCOUNTER
Last Appointment:  2/14/2020  Future Appointments   Date Time Provider Hailee Mares   5/14/2020 11:30 AM ANGELITO Yadav - CNP [facility]   7/13/2020 11:00 AM Kallie Nuñez MD [facility]

## 2020-06-02 ENCOUNTER — TELEPHONE (OUTPATIENT)
Dept: ADMINISTRATIVE | Age: 50
End: 2020-06-02

## 2020-06-02 NOTE — TELEPHONE ENCOUNTER
Pt calling in to say that the stomach medication that he is on for his intestines is making him sick and causing him more pain. Wants to discuss with the doctor if there is anything else that he can take that may not upset his stomach and cause so much pain. Please advise.

## 2020-06-04 ENCOUNTER — VIRTUAL VISIT (OUTPATIENT)
Dept: FAMILY MEDICINE CLINIC | Age: 50
End: 2020-06-04
Payer: MEDICARE

## 2020-06-04 VITALS — WEIGHT: 225 LBS | HEIGHT: 75 IN | BODY MASS INDEX: 27.98 KG/M2

## 2020-06-04 PROCEDURE — G8427 DOCREV CUR MEDS BY ELIG CLIN: HCPCS | Performed by: NURSE PRACTITIONER

## 2020-06-04 PROCEDURE — 99213 OFFICE O/P EST LOW 20 MIN: CPT | Performed by: NURSE PRACTITIONER

## 2020-06-04 RX ORDER — TESTOSTERONE 16.2 MG/G
GEL TRANSDERMAL
Qty: 3 BOTTLE | Refills: 2 | Status: CANCELLED | OUTPATIENT
Start: 2020-06-04 | End: 2020-12-08

## 2020-06-04 ASSESSMENT — ENCOUNTER SYMPTOMS
SHORTNESS OF BREATH: 1
EYE PAIN: 0
NAUSEA: 1
COUGH: 0
CONSTIPATION: 0
DIARRHEA: 0
SINUS PAIN: 0
CHEST TIGHTNESS: 0
COLOR CHANGE: 0
VOMITING: 0
WHEEZING: 0

## 2020-06-04 NOTE — PROGRESS NOTES
TeleMedicine Patient Consent    This visit was performed as a virtual video visit using a synchronous, two-way, audio-video telehealth technology platform. Patient identification was verified at the start of the visit, including the patient's telephone number and physical location. I discussed with the patient the nature of our telehealth visits, that:     1. Due to the nature of an audio- video modality, the only components of a physical exam that could be done are the elements supported by direct observation. 2. I would evaluate the patient and recommend diagnostics and treatments based on my assessment. 3. If it was felt that the patient should be evaluated in clinic or an emergency room setting, then they would be directed there. 4. Our sessions are not being recorded and that personal health information is protected. 5. Our team would provide follow up care in person if/when the patient needs it. Patient does agree to proceed with telemedicine consultation. Patient's location: other address in [address] home address in [facility] other people involved in call n/a    HPI:  Patient comes in today for   Chief Complaint   Patient presents with    Back Pain     lower and mid back pain, cramping in legs. began 3 days ago. pain is intermittant and throbbing.  Shortness of Breath     began 2 weeks ago only on exertion    Fatigue     began 2 weeks ago, no energy    Medication Problem     would like liquid carafate instead of the pills   . Drinking 1-2 bottles of water a day, orange juice, frapp. Has not followed with Dr. Dana Payne. Has not been exercising like he used to, reports increase of GERD symptoms. 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 Prior to Visit Medications    Medication Sig Taking?  Authorizing Provider   pantoprazole (PROTONIX) 40 MG tablet Take 1 tablet by mouth daily Yes Roxanne Jaime, APRN - CNP   ondansetron [facility] Respiratory: Positive for shortness of breath. Negative for cough, chest tightness and wheezing. Cardiovascular: Negative for chest pain, palpitations and leg swelling. Gastrointestinal: Positive for nausea. Negative for constipation, diarrhea and vomiting. Endocrine: Negative for cold intolerance, heat intolerance and polyuria. Genitourinary: Negative for difficulty urinating and frequency. Musculoskeletal: Negative for arthralgias and myalgias. Skin: Negative for color change and rash. Neurological: Positive for dizziness. Negative for light-headedness and headaches. Hematological: Negative for adenopathy. Does not bruise/bleed easily. Psychiatric/Behavioral: Negative for agitation, decreased concentration, sleep disturbance and suicidal ideas. The patient is not nervous/anxious. VS:  Ht 6' 3\" (1.905 m)   Wt 225 lb (102.1 kg)   BMI 28.12 kg/m²     Physical Exam    Physical Exam    Health Maintenance    BP Readings from Last 1 Encounters:   03/18/20 112/82    Recheck if >140/90  Hemoglobin A1C (%)   Date Value   07/18/2014 6.7 (H)     No results found for: LABMICR    Have you had your Pneumonia Vaccine no    Have you had a Colorectal Screening  N/A    Have you had a Screening Mammogram  N/A    Have you seen any other physician or provider since your last visit no    Have you had any other diagnostic tests since your last visit? no    Plan:    Alivia Guillermo was seen today for back pain, shortness of breath, fatigue and medication problem. Diagnoses and all orders for this visit:    Male hypogonadism  - Alivia Guillermo is encouraged to follow up with his urologist for regarding his testosterone refills; he is agreeable to this    Gastroesophageal reflux disease with esophagitis  -     pantoprazole (PROTONIX) 40 MG tablet; Take 1 tablet by mouth daily  - The current medical regimen is effective;  continue present plan and medications.     Nausea  -     ondansetron (ZOFRAN ODT) 4 MG disintegrating tablet; Take 1 tablet by mouth every 8 hours as needed for Nausea or Vomiting  - The patient is asked to make an attempt to improve diet and exercise patterns to aid in medical management of this problem. - Consider labs at next appointment    Time spent: Greater than 15    This visit was completed virtually using Doxy. me    Greater than 15  Minutes was spent with patient and more than 50% of the time was spent face to facecounseling and educating regarding diagnoses

## 2020-06-05 RX ORDER — ONDANSETRON 4 MG/1
4 TABLET, ORALLY DISINTEGRATING ORAL EVERY 8 HOURS PRN
Qty: 30 TABLET | Refills: 0 | Status: SHIPPED
Start: 2020-06-05 | End: 2021-12-23 | Stop reason: SDUPTHER

## 2020-06-05 RX ORDER — PANTOPRAZOLE SODIUM 40 MG/1
40 TABLET, DELAYED RELEASE ORAL DAILY
Qty: 90 TABLET | Refills: 1 | Status: SHIPPED
Start: 2020-06-05 | End: 2021-12-23 | Stop reason: SDUPTHER

## 2020-06-08 RX ORDER — TESTOSTERONE 16.2 MG/G
GEL TRANSDERMAL
Qty: 2 BOTTLE | Refills: 2 | Status: SHIPPED | OUTPATIENT
Start: 2020-06-08 | End: 2020-10-06

## 2020-09-07 ENCOUNTER — APPOINTMENT (OUTPATIENT)
Dept: CT IMAGING | Age: 50
End: 2020-09-07
Payer: MEDICARE

## 2020-09-07 ENCOUNTER — HOSPITAL ENCOUNTER (EMERGENCY)
Age: 50
Discharge: HOME | End: 2020-09-07
Attending: EMERGENCY MEDICINE
Payer: MEDICARE

## 2020-09-07 VITALS
OXYGEN SATURATION: 99 % | TEMPERATURE: 97.2 F | HEART RATE: 80 BPM | WEIGHT: 240 LBS | HEIGHT: 75 IN | BODY MASS INDEX: 29.84 KG/M2 | SYSTOLIC BLOOD PRESSURE: 120 MMHG | DIASTOLIC BLOOD PRESSURE: 80 MMHG | RESPIRATION RATE: 16 BRPM

## 2020-09-07 LAB
ALBUMIN SERPL-MCNC: 4.4 G/DL (ref 3.5–5.2)
ALP BLD-CCNC: 48 U/L (ref 40–129)
ALT SERPL-CCNC: 17 U/L (ref 0–40)
ANION GAP SERPL CALCULATED.3IONS-SCNC: 13 MMOL/L (ref 7–16)
AST SERPL-CCNC: 15 U/L (ref 0–39)
BACTERIA: ABNORMAL /HPF
BASOPHILS ABSOLUTE: 0.05 E9/L (ref 0–0.2)
BASOPHILS RELATIVE PERCENT: 0.6 % (ref 0–2)
BILIRUB SERPL-MCNC: 0.2 MG/DL (ref 0–1.2)
BILIRUBIN URINE: NEGATIVE
BLOOD, URINE: ABNORMAL
BUN BLDV-MCNC: 9 MG/DL (ref 6–20)
CALCIUM SERPL-MCNC: 9.8 MG/DL (ref 8.6–10.2)
CHLORIDE BLD-SCNC: 103 MMOL/L (ref 98–107)
CLARITY: CLEAR
CO2: 24 MMOL/L (ref 22–29)
COLOR: YELLOW
CREAT SERPL-MCNC: 1 MG/DL (ref 0.7–1.2)
EKG ATRIAL RATE: 47 BPM
EKG P AXIS: 45 DEGREES
EKG P-R INTERVAL: 186 MS
EKG Q-T INTERVAL: 494 MS
EKG QRS DURATION: 160 MS
EKG QTC CALCULATION (BAZETT): 437 MS
EKG R AXIS: -62 DEGREES
EKG T AXIS: 19 DEGREES
EKG VENTRICULAR RATE: 47 BPM
EOSINOPHILS ABSOLUTE: 0.13 E9/L (ref 0.05–0.5)
EOSINOPHILS RELATIVE PERCENT: 1.6 % (ref 0–6)
EPITHELIAL CELLS, UA: ABNORMAL /HPF
GFR AFRICAN AMERICAN: >60
GFR NON-AFRICAN AMERICAN: >60 ML/MIN/1.73
GLUCOSE BLD-MCNC: 87 MG/DL (ref 74–99)
GLUCOSE URINE: NEGATIVE MG/DL
HCT VFR BLD CALC: 41.4 % (ref 37–54)
HEMOGLOBIN: 13.6 G/DL (ref 12.5–16.5)
IMMATURE GRANULOCYTES #: 0.03 E9/L
IMMATURE GRANULOCYTES %: 0.4 % (ref 0–5)
KETONES, URINE: NEGATIVE MG/DL
LEUKOCYTE ESTERASE, URINE: NEGATIVE
LYMPHOCYTES ABSOLUTE: 4.16 E9/L (ref 1.5–4)
LYMPHOCYTES RELATIVE PERCENT: 52.2 % (ref 20–42)
MCH RBC QN AUTO: 30.2 PG (ref 26–35)
MCHC RBC AUTO-ENTMCNC: 32.9 % (ref 32–34.5)
MCV RBC AUTO: 92 FL (ref 80–99.9)
MONOCYTES ABSOLUTE: 0.75 E9/L (ref 0.1–0.95)
MONOCYTES RELATIVE PERCENT: 9.4 % (ref 2–12)
NEUTROPHILS ABSOLUTE: 2.85 E9/L (ref 1.8–7.3)
NEUTROPHILS RELATIVE PERCENT: 35.8 % (ref 43–80)
NITRITE, URINE: NEGATIVE
PDW BLD-RTO: 14.1 FL (ref 11.5–15)
PH UA: 6 (ref 5–9)
PLATELET # BLD: 228 E9/L (ref 130–450)
PMV BLD AUTO: 10 FL (ref 7–12)
POTASSIUM SERPL-SCNC: 4 MMOL/L (ref 3.5–5)
PROTEIN UA: NEGATIVE MG/DL
RBC # BLD: 4.5 E12/L (ref 3.8–5.8)
RBC UA: ABNORMAL /HPF (ref 0–2)
SODIUM BLD-SCNC: 140 MMOL/L (ref 132–146)
SPECIFIC GRAVITY UA: 1.02 (ref 1–1.03)
TOTAL PROTEIN: 7.3 G/DL (ref 6.4–8.3)
TROPONIN: <0.01 NG/ML (ref 0–0.03)
TSH SERPL DL<=0.05 MIU/L-ACNC: <0.01 UIU/ML (ref 0.27–4.2)
UROBILINOGEN, URINE: 0.2 E.U./DL
WBC # BLD: 8 E9/L (ref 4.5–11.5)
WBC UA: ABNORMAL /HPF (ref 0–5)

## 2020-09-07 PROCEDURE — 2580000003 HC RX 258: Performed by: STUDENT IN AN ORGANIZED HEALTH CARE EDUCATION/TRAINING PROGRAM

## 2020-09-07 PROCEDURE — 99283 EMERGENCY DEPT VISIT LOW MDM: CPT

## 2020-09-07 PROCEDURE — 93010 ELECTROCARDIOGRAM REPORT: CPT | Performed by: INTERNAL MEDICINE

## 2020-09-07 PROCEDURE — 96361 HYDRATE IV INFUSION ADD-ON: CPT

## 2020-09-07 PROCEDURE — 93005 ELECTROCARDIOGRAM TRACING: CPT | Performed by: EMERGENCY MEDICINE

## 2020-09-07 PROCEDURE — 96372 THER/PROPH/DIAG INJ SC/IM: CPT

## 2020-09-07 PROCEDURE — 99284 EMERGENCY DEPT VISIT MOD MDM: CPT

## 2020-09-07 PROCEDURE — 70450 CT HEAD/BRAIN W/O DYE: CPT

## 2020-09-07 PROCEDURE — 6360000002 HC RX W HCPCS: Performed by: STUDENT IN AN ORGANIZED HEALTH CARE EDUCATION/TRAINING PROGRAM

## 2020-09-07 PROCEDURE — 96360 HYDRATION IV INFUSION INIT: CPT

## 2020-09-07 PROCEDURE — 84484 ASSAY OF TROPONIN QUANT: CPT

## 2020-09-07 PROCEDURE — 80053 COMPREHEN METABOLIC PANEL: CPT

## 2020-09-07 PROCEDURE — 84443 ASSAY THYROID STIM HORMONE: CPT

## 2020-09-07 PROCEDURE — 6370000000 HC RX 637 (ALT 250 FOR IP): Performed by: STUDENT IN AN ORGANIZED HEALTH CARE EDUCATION/TRAINING PROGRAM

## 2020-09-07 PROCEDURE — 85025 COMPLETE CBC W/AUTO DIFF WBC: CPT

## 2020-09-07 PROCEDURE — 81001 URINALYSIS AUTO W/SCOPE: CPT

## 2020-09-07 RX ORDER — 0.9 % SODIUM CHLORIDE 0.9 %
1000 INTRAVENOUS SOLUTION INTRAVENOUS ONCE
Status: COMPLETED | OUTPATIENT
Start: 2020-09-07 | End: 2020-09-07

## 2020-09-07 RX ORDER — MECLIZINE HCL 12.5 MG/1
37.5 TABLET ORAL ONCE
Status: COMPLETED | OUTPATIENT
Start: 2020-09-07 | End: 2020-09-07

## 2020-09-07 RX ORDER — DIAZEPAM 5 MG/1
5 TABLET ORAL ONCE
Status: COMPLETED | OUTPATIENT
Start: 2020-09-07 | End: 2020-09-07

## 2020-09-07 RX ADMIN — SODIUM CHLORIDE 1000 ML: 9 INJECTION, SOLUTION INTRAVENOUS at 09:57

## 2020-09-07 RX ADMIN — DIAZEPAM 5 MG: 5 TABLET ORAL at 06:51

## 2020-09-07 RX ADMIN — SODIUM CHLORIDE 1000 ML: 9 INJECTION, SOLUTION INTRAVENOUS at 06:51

## 2020-09-07 RX ADMIN — MECLIZINE 37.5 MG: 12.5 TABLET ORAL at 09:57

## 2020-09-07 RX ADMIN — TRIMETHOBENZAMIDE HYDROCHLORIDE 200 MG: 100 INJECTION INTRAMUSCULAR at 07:09

## 2020-09-07 ASSESSMENT — ENCOUNTER SYMPTOMS
BLOOD IN STOOL: 0
CONSTIPATION: 0
COUGH: 0
VOMITING: 0
NAUSEA: 0
ABDOMINAL PAIN: 1
DIARRHEA: 0
CHEST TIGHTNESS: 0
WHEEZING: 0
COLOR CHANGE: 0
BACK PAIN: 1
SHORTNESS OF BREATH: 0

## 2020-09-07 ASSESSMENT — PAIN DESCRIPTION - PROGRESSION: CLINICAL_PROGRESSION: NOT CHANGED

## 2020-09-07 ASSESSMENT — PAIN DESCRIPTION - PAIN TYPE: TYPE: ACUTE PAIN

## 2020-09-07 ASSESSMENT — PAIN SCALES - GENERAL: PAINLEVEL_OUTOF10: 9

## 2020-09-07 ASSESSMENT — PAIN DESCRIPTION - DESCRIPTORS: DESCRIPTORS: THROBBING;CONSTANT

## 2020-09-07 ASSESSMENT — PAIN DESCRIPTION - ORIENTATION: ORIENTATION: LOWER

## 2020-09-07 ASSESSMENT — PAIN DESCRIPTION - ONSET: ONSET: ON-GOING

## 2020-09-07 ASSESSMENT — PAIN DESCRIPTION - FREQUENCY: FREQUENCY: CONTINUOUS

## 2020-09-07 ASSESSMENT — PAIN DESCRIPTION - LOCATION: LOCATION: BACK

## 2020-09-07 NOTE — ED NOTES
Pt c/o 9/10 bilateral lower back pain radiating to abd onset yesterday AM. Pt states he came in this morning d/t worsening back pain and dizziness and nausea that started upon waking today at 0200. A+Ox3 No s/sx of distress at this time.       Inocencia Bah RN  09/07/20 [id number]

## 2020-09-07 NOTE — ED NOTES
Orthostatic BP  Laying HR 55 /82  Sitting HR 49  /92  Standing HR 75  /92     [place], HOA  09/07/20 [id number]

## 2020-09-07 NOTE — ED NOTES
Bed: 13  Expected date:   Expected time:   Means of arrival:   Comments:  Triage     Maral Rodriguez RN  09/07/20 [id number]
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
## 2020-09-22 ENCOUNTER — OFFICE VISIT (OUTPATIENT)
Dept: FAMILY MEDICINE CLINIC | Age: 50
End: 2020-09-22
Payer: MEDICARE

## 2020-09-22 ENCOUNTER — HOSPITAL ENCOUNTER (OUTPATIENT)
Age: 50
Discharge: HOME | End: 2020-09-24
Payer: MEDICARE

## 2020-09-22 VITALS
TEMPERATURE: 98.1 F | HEIGHT: 75 IN | BODY MASS INDEX: 27.6 KG/M2 | RESPIRATION RATE: 16 BRPM | DIASTOLIC BLOOD PRESSURE: 82 MMHG | WEIGHT: 222 LBS | HEART RATE: 88 BPM | SYSTOLIC BLOOD PRESSURE: 114 MMHG | OXYGEN SATURATION: 97 %

## 2020-09-22 LAB
BILIRUBIN, POC: NEGATIVE
BLOOD URINE, POC: ABNORMAL
CLARITY, POC: CLEAR
COLOR, POC: ABNORMAL
GLUCOSE URINE, POC: NEGATIVE
KETONES, POC: NEGATIVE
LEUKOCYTE EST, POC: NEGATIVE
NITRITE, POC: NEGATIVE
PH, POC: 6.5
PROTEIN, POC: ABNORMAL
SPECIFIC GRAVITY, POC: 1.02
T4 FREE: 2.08 NG/DL (ref 0.93–1.7)
TSH SERPL DL<=0.05 MIU/L-ACNC: <0.01 UIU/ML (ref 0.27–4.2)
UROBILINOGEN, POC: 0.2

## 2020-09-22 PROCEDURE — 87077 CULTURE AEROBIC IDENTIFY: CPT

## 2020-09-22 PROCEDURE — 84443 ASSAY THYROID STIM HORMONE: CPT

## 2020-09-22 PROCEDURE — 87186 SC STD MICRODIL/AGAR DIL: CPT

## 2020-09-22 PROCEDURE — 96372 THER/PROPH/DIAG INJ SC/IM: CPT | Performed by: NURSE PRACTITIONER

## 2020-09-22 PROCEDURE — 81002 URINALYSIS NONAUTO W/O SCOPE: CPT | Performed by: NURSE PRACTITIONER

## 2020-09-22 PROCEDURE — 4004F PT TOBACCO SCREEN RCVD TLK: CPT | Performed by: NURSE PRACTITIONER

## 2020-09-22 PROCEDURE — 87147 CULTURE TYPE IMMUNOLOGIC: CPT

## 2020-09-22 PROCEDURE — 87088 URINE BACTERIA CULTURE: CPT

## 2020-09-22 PROCEDURE — 99213 OFFICE O/P EST LOW 20 MIN: CPT | Performed by: NURSE PRACTITIONER

## 2020-09-22 PROCEDURE — G8417 CALC BMI ABV UP PARAM F/U: HCPCS | Performed by: NURSE PRACTITIONER

## 2020-09-22 PROCEDURE — 84439 ASSAY OF FREE THYROXINE: CPT

## 2020-09-22 PROCEDURE — G8427 DOCREV CUR MEDS BY ELIG CLIN: HCPCS | Performed by: NURSE PRACTITIONER

## 2020-09-22 RX ORDER — KETOROLAC TROMETHAMINE 30 MG/ML
60 INJECTION, SOLUTION INTRAMUSCULAR; INTRAVENOUS ONCE
Status: COMPLETED | OUTPATIENT
Start: 2020-09-22 | End: 2020-09-22

## 2020-09-22 RX ORDER — METHYLPREDNISOLONE 4 MG/1
TABLET ORAL
Qty: 1 KIT | Refills: 0 | Status: SHIPPED | OUTPATIENT
Start: 2020-09-22 | End: 2020-09-28

## 2020-09-22 RX ORDER — KETOROLAC TROMETHAMINE 30 MG/ML
60 INJECTION, SOLUTION INTRAMUSCULAR; INTRAVENOUS ONCE
Qty: 2 ML | Refills: 0
Start: 2020-09-22 | End: 2020-09-22 | Stop reason: CLARIF

## 2020-09-22 RX ORDER — TRIAMCINOLONE ACETONIDE 40 MG/ML
40 INJECTION, SUSPENSION INTRA-ARTICULAR; INTRAMUSCULAR ONCE
Status: COMPLETED | OUTPATIENT
Start: 2020-09-22 | End: 2020-09-22

## 2020-09-22 RX ORDER — DOXYCYCLINE HYCLATE 100 MG
100 TABLET ORAL 2 TIMES DAILY
Qty: 14 TABLET | Refills: 0 | Status: SHIPPED | OUTPATIENT
Start: 2020-09-22 | End: 2020-09-29

## 2020-09-22 RX ADMIN — KETOROLAC TROMETHAMINE 60 MG: 30 INJECTION, SOLUTION INTRAMUSCULAR; INTRAVENOUS at 10:44

## 2020-09-22 RX ADMIN — TRIAMCINOLONE ACETONIDE 40 MG: 40 INJECTION, SUSPENSION INTRA-ARTICULAR; INTRAMUSCULAR at 10:46

## 2020-09-22 NOTE — PROGRESS NOTES
Surgical History:   Procedure Laterality Date    BRAIN SURGERY      times 5    JOINT REPLACEMENT  [id number]    left and right hip-? avascular necrosis?  JOINT REPLACEMENT      hip x2, knee    PITUITARY SURGERY      twice    UPPER GASTROINTESTINAL ENDOSCOPY N/A 12/1/2018    EGD BIOPSY performed by Patricia Chavez MD at [address] [place] [address] History:  reports that he has been smoking cigarettes and pipe. He has a 5.25 pack-year smoking history. He has never used smokeless tobacco. He reports current alcohol use. He reports previous drug use. Drug: Marijuana. Family History: family history includes Cancer in his maternal grandfather and mother; Diabetes in his maternal grandmother, maternal uncle, mother, and sister; Early Death in his brother; Kidney Disease in his mother. Allergies: Reglan [metoclopramide]    Physical Exam         VS:  /82   Pulse 88   Temp 98.1 °F (36.7 °C) (Oral)   Resp 16   Ht 6' 3\" (1.905 m)   Wt 222 lb (100.7 kg)   SpO2 97%   BMI 27.75 kg/m²    Oxygen Saturation Interpretation: Normal.    Constitutional:  Alert, development consistent with age. Neck:  Normal ROM. Supple. No TTP. Lungs:  Clear to auscultation and breath sounds equal.  Heart:  Regular rate and rhythm, normal heart sounds, without pathological murmurs, ectopy, gallops, or rubs. Abdomen:  Soft, nontender, good bowel sounds. No firm or pulsatile mass. Back: Tenderness: TTP over T9-10 with no appreciable midline tenderness. Swelling: No edema. Range of Motion: Decreased lateral and front to back ROM due to pain. CVA Tenderness: No CVA tenderness bilaterally. Skin:  No bruising, redness, abrasions, or rashes. Distal Function:              Motor deficit: Strength 5/5 in LE's bilaterally. Sensory deficit: Distal sensation intact. Pulse deficit: Distal pulses 2+ and bounding.             Calf Tenderness:  No bilateral calf tenderness. Negative Gerardo's sign bilaterally               Reflexes: Intact at knee and achilles bilaterally. Gait:  No antalgia noted. Skin:  Normal turgor. Warm, dry, without visible rash. Neurological:  Alert and oriented. Motor functions intact. Lab / Imaging Results   (All laboratory and radiology results have been personally reviewed by myself)  Labs:  Results for orders placed or performed in visit on 09/22/20   POCT Urinalysis no Micro   Result Value Ref Range    Color, UA dk yellow     Clarity, UA clear     Glucose, UA POC negative     Bilirubin, UA negative     Ketones, UA negative     Spec Grav, UA 1.025     Blood, UA POC large     pH, UA 6.5     Protein, UA POC trace     Urobilinogen, UA 0.2     Leukocytes, UA negative     Nitrite, UA negative      Imaging: All Radiology results interpreted by Radiologist unless otherwise noted. Assessment / Plan     Impression(s):  1. Asymptomatic microscopic hematuria    2. Lumbar radiculopathy, acute    3. H/O: pituitary tumor    4. Expiratory wheezing    5. Abnormal results of thyroid function studies       ASSESSMENT/PLAN:    1. Asymptomatic microscopic hematuria  - XR ABDOMEN (KUB) (SINGLE AP VIEW); Future  - POCT Urinalysis no Micro  - Culture, Urine; Future  - Consider urology referral if hematuria remains    2. Lumbar radiculopathy, acute  - triamcinolone acetonide (KENALOG-40) injection 40 mg  - ketorolac (TORADOL) injection 60 mg    3. H/O: pituitary tumor  - TSH without Reflex; Future  - T4, Free; Future    4. Expiratory wheezing  - methylPREDNISolone (MEDROL DOSEPACK) 4 MG tablet; Take by mouth. Dispense: 1 kit; Refill: 0  - doxycycline hyclate (VIBRA-TABS) 100 MG tablet; Take 1 tablet by mouth 2 times daily for 7 days  Dispense: 14 tablet; Refill: 0    5. Abnormal results of thyroid function studies   - TSH without Reflex; Future  - T4, Free; Future    RTO in 2 weeks for follow-up    Scripts written,side effects discussed.  Advise rest, ice, and/or moist heat for additional relief. PCP in 1-2 weeks if symptoms persist. ED sooner if symptoms worsen or change. ED immediately with any fever, severe or worsening back pain, paresthesias, weakness, or GI/ incontinence. Pt states understanding and is in agreement with this care plan. All questions answered.

## 2020-09-24 RX ORDER — LEVOTHYROXINE SODIUM 137 UG/1
137 TABLET ORAL DAILY
Qty: 30 TABLET | Refills: 1 | Status: SHIPPED
Start: 2020-09-24 | End: 2021-03-03 | Stop reason: SDUPTHER

## 2020-09-26 LAB
ORGANISM: ABNORMAL
URINE CULTURE, ROUTINE: ABNORMAL
URINE CULTURE, ROUTINE: ABNORMAL

## 2020-10-06 RX ORDER — TESTOSTERONE 16.2 MG/G
GEL TRANSDERMAL
Qty: 150 G | Refills: 0 | Status: SHIPPED | OUTPATIENT
Start: 2020-10-06 | End: 2020-11-16 | Stop reason: SDUPTHER

## 2020-11-16 ENCOUNTER — TELEPHONE (OUTPATIENT)
Dept: FAMILY MEDICINE CLINIC | Age: 50
End: 2020-11-16

## 2020-11-16 RX ORDER — TESTOSTERONE 16.2 MG/G
GEL TRANSDERMAL
Qty: 150 G | Refills: 2 | Status: SHIPPED | OUTPATIENT
Start: 2020-11-16 | End: 2021-03-03 | Stop reason: SDUPTHER

## 2020-11-16 NOTE — TELEPHONE ENCOUNTER
Alexander Kahn called and said he needed his androgel refilled can you please send it to [facility] on [facility].  Thank you

## 2021-01-18 DIAGNOSIS — E03.8 CENTRAL HYPOTHYROIDISM: ICD-10-CM

## 2021-01-18 DIAGNOSIS — E23.0 PANHYPOPITUITARISM (HCC): ICD-10-CM

## 2021-01-18 RX ORDER — HYDROCORTISONE 20 MG/1
TABLET ORAL
Qty: 135 TABLET | Refills: 0 | Status: SHIPPED
Start: 2021-01-18 | End: 2021-03-03 | Stop reason: SDUPTHER

## 2021-03-03 ENCOUNTER — TELEPHONE (OUTPATIENT)
Dept: ENDOCRINOLOGY | Age: 51
End: 2021-03-03

## 2021-03-03 DIAGNOSIS — E03.8 CENTRAL HYPOTHYROIDISM: ICD-10-CM

## 2021-03-03 DIAGNOSIS — E23.0 PANHYPOPITUITARISM (HCC): ICD-10-CM

## 2021-03-03 DIAGNOSIS — E29.1 MALE HYPOGONADISM: ICD-10-CM

## 2021-03-03 RX ORDER — HYDROCORTISONE 20 MG/1
TABLET ORAL
Qty: 45 TABLET | Refills: 5 | Status: SHIPPED
Start: 2021-03-03 | End: 2021-05-20 | Stop reason: SDUPTHER

## 2021-03-03 RX ORDER — LEVOTHYROXINE SODIUM 137 UG/1
137 TABLET ORAL DAILY
Qty: 30 TABLET | Refills: 1 | Status: SHIPPED
Start: 2021-03-03 | End: 2021-05-17 | Stop reason: SDUPTHER

## 2021-03-03 RX ORDER — TESTOSTERONE 16.2 MG/G
GEL TRANSDERMAL
Qty: 150 G | Refills: 2 | Status: SHIPPED | OUTPATIENT
Start: 2021-03-03 | End: 2021-03-05 | Stop reason: SDUPTHER

## 2021-03-05 DIAGNOSIS — E29.1 MALE HYPOGONADISM: ICD-10-CM

## 2021-03-05 DIAGNOSIS — E03.8 CENTRAL HYPOTHYROIDISM: ICD-10-CM

## 2021-03-05 DIAGNOSIS — E23.2 DIABETES INSIPIDUS (HCC): ICD-10-CM

## 2021-03-05 DIAGNOSIS — E23.0 PANHYPOPITUITARISM (HCC): ICD-10-CM

## 2021-03-05 RX ORDER — TESTOSTERONE 16.2 MG/G
GEL TRANSDERMAL
Qty: 150 G | Refills: 0 | Status: SHIPPED | OUTPATIENT
Start: 2021-03-05 | End: 2021-06-08 | Stop reason: SDUPTHER

## 2021-03-08 RX ORDER — DESMOPRESSIN ACETATE 0.2 MG/1
TABLET ORAL
Qty: 75 TABLET | Refills: 0 | Status: SHIPPED
Start: 2021-03-08 | End: 2021-04-16 | Stop reason: SDUPTHER

## 2021-04-16 DIAGNOSIS — E23.2 DIABETES INSIPIDUS (HCC): ICD-10-CM

## 2021-04-16 DIAGNOSIS — E23.0 PANHYPOPITUITARISM (HCC): ICD-10-CM

## 2021-04-16 DIAGNOSIS — E03.8 CENTRAL HYPOTHYROIDISM: ICD-10-CM

## 2021-04-16 RX ORDER — DESMOPRESSIN ACETATE 0.2 MG/1
TABLET ORAL
Qty: 75 TABLET | Refills: 1 | Status: SHIPPED
Start: 2021-04-16 | End: 2021-06-08 | Stop reason: SDUPTHER

## 2021-05-17 DIAGNOSIS — J01.20 ACUTE ETHMOIDAL SINUSITIS, RECURRENCE NOT SPECIFIED: ICD-10-CM

## 2021-05-17 DIAGNOSIS — E03.8 CENTRAL HYPOTHYROIDISM: ICD-10-CM

## 2021-05-17 RX ORDER — LEVOTHYROXINE SODIUM 137 UG/1
137 TABLET ORAL DAILY
Qty: 30 TABLET | Refills: 1 | Status: SHIPPED
Start: 2021-05-17 | End: 2021-06-08 | Stop reason: SDUPTHER

## 2021-05-20 DIAGNOSIS — E23.0 PANHYPOPITUITARISM (HCC): ICD-10-CM

## 2021-05-20 DIAGNOSIS — E03.8 CENTRAL HYPOTHYROIDISM: ICD-10-CM

## 2021-05-20 RX ORDER — HYDROCORTISONE 20 MG/1
TABLET ORAL
Qty: 45 TABLET | Refills: 1 | Status: SHIPPED
Start: 2021-05-20 | End: 2021-06-08 | Stop reason: SDUPTHER

## 2021-06-08 ENCOUNTER — OFFICE VISIT (OUTPATIENT)
Dept: ENDOCRINOLOGY | Age: 51
End: 2021-06-08
Payer: MEDICARE

## 2021-06-08 VITALS
BODY MASS INDEX: 27.98 KG/M2 | HEART RATE: 68 BPM | OXYGEN SATURATION: 98 % | HEIGHT: 75 IN | SYSTOLIC BLOOD PRESSURE: 132 MMHG | DIASTOLIC BLOOD PRESSURE: 72 MMHG | RESPIRATION RATE: 18 BRPM | WEIGHT: 225 LBS

## 2021-06-08 DIAGNOSIS — E23.2 DIABETES INSIPIDUS (HCC): ICD-10-CM

## 2021-06-08 DIAGNOSIS — E03.8 CENTRAL HYPOTHYROIDISM: ICD-10-CM

## 2021-06-08 DIAGNOSIS — E29.1 MALE HYPOGONADISM: ICD-10-CM

## 2021-06-08 DIAGNOSIS — E23.0 PANHYPOPITUITARISM (HCC): Primary | ICD-10-CM

## 2021-06-08 DIAGNOSIS — E55.9 VITAMIN D DEFICIENCY: ICD-10-CM

## 2021-06-08 PROCEDURE — 4004F PT TOBACCO SCREEN RCVD TLK: CPT | Performed by: INTERNAL MEDICINE

## 2021-06-08 PROCEDURE — 99214 OFFICE O/P EST MOD 30 MIN: CPT | Performed by: INTERNAL MEDICINE

## 2021-06-08 PROCEDURE — 3017F COLORECTAL CA SCREEN DOC REV: CPT | Performed by: INTERNAL MEDICINE

## 2021-06-08 PROCEDURE — G8427 DOCREV CUR MEDS BY ELIG CLIN: HCPCS | Performed by: INTERNAL MEDICINE

## 2021-06-08 PROCEDURE — G8417 CALC BMI ABV UP PARAM F/U: HCPCS | Performed by: INTERNAL MEDICINE

## 2021-06-08 RX ORDER — DESMOPRESSIN ACETATE 0.2 MG/1
TABLET ORAL
Qty: 220 TABLET | Refills: 2 | Status: SHIPPED
Start: 2021-06-08 | End: 2021-12-23 | Stop reason: SDUPTHER

## 2021-06-08 RX ORDER — TESTOSTERONE 16.2 MG/G
GEL TRANSDERMAL
Qty: 150 G | Refills: 3 | Status: SHIPPED | OUTPATIENT
Start: 2021-06-08 | End: 2021-08-23 | Stop reason: SDUPTHER

## 2021-06-08 RX ORDER — LEVOTHYROXINE SODIUM 137 UG/1
137 TABLET ORAL DAILY
Qty: 90 TABLET | Refills: 2 | Status: SHIPPED
Start: 2021-06-08 | End: 2021-12-23 | Stop reason: SDUPTHER

## 2021-06-08 RX ORDER — HYDROCORTISONE 20 MG/1
TABLET ORAL
Qty: 135 TABLET | Refills: 2 | Status: SHIPPED
Start: 2021-06-08 | End: 2021-12-23 | Stop reason: SDUPTHER
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
## 2021-06-11 ENCOUNTER — TELEPHONE (OUTPATIENT)
Dept: ENDOCRINOLOGY | Age: 51
End: 2021-06-11

## 2021-08-23 DIAGNOSIS — E29.1 MALE HYPOGONADISM: ICD-10-CM

## 2021-08-23 RX ORDER — TESTOSTERONE 16.2 MG/G
GEL TRANSDERMAL
Qty: 150 G | Refills: 3 | Status: SHIPPED | OUTPATIENT
Start: 2021-08-23 | End: 2021-12-23 | Stop reason: SDUPTHER

## 2021-09-02 ENCOUNTER — TELEPHONE (OUTPATIENT)
Dept: ENDOCRINOLOGY | Age: 51
End: 2021-09-02

## 2021-09-02 NOTE — TELEPHONE ENCOUNTER
Patient phoned in stating that hew would like to know if he is able to take a herbal Burdock Root 2 tabs 3 times a  Day. He was concern because he has the pituitary tumor. Please advise.

## 2021-09-03 NOTE — TELEPHONE ENCOUNTER
We don't have any data about effect of these supplement on pituitary but I don't think it will be an issue

## 2021-11-29 ENCOUNTER — OFFICE VISIT (OUTPATIENT)
Dept: FAMILY MEDICINE CLINIC | Age: 51
End: 2021-11-29
Payer: MEDICARE

## 2021-11-29 VITALS
BODY MASS INDEX: 28.6 KG/M2 | RESPIRATION RATE: 18 BRPM | HEART RATE: 73 BPM | OXYGEN SATURATION: 97 % | DIASTOLIC BLOOD PRESSURE: 72 MMHG | TEMPERATURE: 97.8 F | HEIGHT: 75 IN | SYSTOLIC BLOOD PRESSURE: 98 MMHG | WEIGHT: 230 LBS

## 2021-11-29 DIAGNOSIS — H53.8 BLURRY VISION: ICD-10-CM

## 2021-11-29 DIAGNOSIS — R42 DIZZINESS: Primary | ICD-10-CM

## 2021-11-29 LAB
CHP ED QC CHECK: NORMAL
GLUCOSE BLD-MCNC: 92 MG/DL

## 2021-11-29 PROCEDURE — 4004F PT TOBACCO SCREEN RCVD TLK: CPT | Performed by: STUDENT IN AN ORGANIZED HEALTH CARE EDUCATION/TRAINING PROGRAM

## 2021-11-29 PROCEDURE — 99213 OFFICE O/P EST LOW 20 MIN: CPT | Performed by: STUDENT IN AN ORGANIZED HEALTH CARE EDUCATION/TRAINING PROGRAM

## 2021-11-29 PROCEDURE — G8484 FLU IMMUNIZE NO ADMIN: HCPCS | Performed by: STUDENT IN AN ORGANIZED HEALTH CARE EDUCATION/TRAINING PROGRAM

## 2021-11-29 PROCEDURE — G8427 DOCREV CUR MEDS BY ELIG CLIN: HCPCS | Performed by: STUDENT IN AN ORGANIZED HEALTH CARE EDUCATION/TRAINING PROGRAM

## 2021-11-29 PROCEDURE — 3017F COLORECTAL CA SCREEN DOC REV: CPT | Performed by: STUDENT IN AN ORGANIZED HEALTH CARE EDUCATION/TRAINING PROGRAM

## 2021-11-29 PROCEDURE — 93000 ELECTROCARDIOGRAM COMPLETE: CPT | Performed by: STUDENT IN AN ORGANIZED HEALTH CARE EDUCATION/TRAINING PROGRAM

## 2021-11-29 PROCEDURE — 82962 GLUCOSE BLOOD TEST: CPT | Performed by: STUDENT IN AN ORGANIZED HEALTH CARE EDUCATION/TRAINING PROGRAM

## 2021-11-29 PROCEDURE — G8417 CALC BMI ABV UP PARAM F/U: HCPCS | Performed by: STUDENT IN AN ORGANIZED HEALTH CARE EDUCATION/TRAINING PROGRAM

## 2021-11-29 RX ORDER — MECLIZINE HYDROCHLORIDE 25 MG/1
25 TABLET ORAL 3 TIMES DAILY PRN
Qty: 30 TABLET | Refills: 3 | Status: SHIPPED | OUTPATIENT
Start: 2021-11-29 | End: 2021-12-09
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
## 2021-11-30 ENCOUNTER — TELEPHONE (OUTPATIENT)
Dept: NON INVASIVE DIAGNOSTICS | Age: 51
End: 2021-11-30

## 2021-12-23 ENCOUNTER — OFFICE VISIT (OUTPATIENT)
Dept: FAMILY MEDICINE CLINIC | Age: 51
End: 2021-12-23
Payer: MEDICARE

## 2021-12-23 VITALS
OXYGEN SATURATION: 98 % | HEART RATE: 63 BPM | HEIGHT: 75 IN | RESPIRATION RATE: 16 BRPM | WEIGHT: 229.8 LBS | SYSTOLIC BLOOD PRESSURE: 104 MMHG | BODY MASS INDEX: 28.57 KG/M2 | TEMPERATURE: 98.5 F | DIASTOLIC BLOOD PRESSURE: 66 MMHG

## 2021-12-23 DIAGNOSIS — E23.2 DIABETES INSIPIDUS (HCC): ICD-10-CM

## 2021-12-23 DIAGNOSIS — L29.9 ITCHING: ICD-10-CM

## 2021-12-23 DIAGNOSIS — R51.9 CHRONIC DAILY HEADACHE: ICD-10-CM

## 2021-12-23 DIAGNOSIS — K21.00 GASTROESOPHAGEAL REFLUX DISEASE WITH ESOPHAGITIS WITHOUT HEMORRHAGE: ICD-10-CM

## 2021-12-23 DIAGNOSIS — R11.0 NAUSEA: ICD-10-CM

## 2021-12-23 DIAGNOSIS — J01.20 ACUTE ETHMOIDAL SINUSITIS, RECURRENCE NOT SPECIFIED: ICD-10-CM

## 2021-12-23 DIAGNOSIS — E03.8 CENTRAL HYPOTHYROIDISM: Primary | ICD-10-CM

## 2021-12-23 DIAGNOSIS — E23.0 PANHYPOPITUITARISM (HCC): ICD-10-CM

## 2021-12-23 DIAGNOSIS — E29.1 MALE HYPOGONADISM: ICD-10-CM

## 2021-12-23 PROCEDURE — 3017F COLORECTAL CA SCREEN DOC REV: CPT | Performed by: INTERNAL MEDICINE

## 2021-12-23 PROCEDURE — 4004F PT TOBACCO SCREEN RCVD TLK: CPT | Performed by: INTERNAL MEDICINE

## 2021-12-23 PROCEDURE — G8417 CALC BMI ABV UP PARAM F/U: HCPCS | Performed by: INTERNAL MEDICINE

## 2021-12-23 PROCEDURE — G8484 FLU IMMUNIZE NO ADMIN: HCPCS | Performed by: INTERNAL MEDICINE

## 2021-12-23 PROCEDURE — 99214 OFFICE O/P EST MOD 30 MIN: CPT | Performed by: INTERNAL MEDICINE

## 2021-12-23 PROCEDURE — G8427 DOCREV CUR MEDS BY ELIG CLIN: HCPCS | Performed by: INTERNAL MEDICINE

## 2021-12-23 RX ORDER — LORATADINE 10 MG/1
10 TABLET ORAL DAILY
Qty: 90 TABLET | Refills: 1 | Status: SHIPPED
Start: 2021-12-23 | End: 2022-11-02

## 2021-12-23 RX ORDER — SUCRALFATE 1 G/1
1 TABLET ORAL 2 TIMES DAILY
Qty: 120 TABLET | Refills: 3 | Status: SHIPPED
Start: 2021-12-23 | End: 2022-04-26

## 2021-12-23 RX ORDER — PANTOPRAZOLE SODIUM 40 MG/1
40 TABLET, DELAYED RELEASE ORAL DAILY
Qty: 90 TABLET | Refills: 1 | Status: ON HOLD
Start: 2021-12-23 | End: 2022-08-30 | Stop reason: HOSPADM

## 2021-12-23 RX ORDER — DESMOPRESSIN ACETATE 0.2 MG/1
TABLET ORAL
Qty: 220 TABLET | Refills: 2 | Status: SHIPPED
Start: 2021-12-23 | End: 2022-07-13

## 2021-12-23 RX ORDER — LEVOTHYROXINE SODIUM 137 UG/1
137 TABLET ORAL DAILY
Qty: 90 TABLET | Refills: 2 | Status: SHIPPED
Start: 2021-12-23 | End: 2022-07-13

## 2021-12-23 RX ORDER — TESTOSTERONE 16.2 MG/G
GEL TRANSDERMAL
Qty: 150 G | Refills: 3 | Status: SHIPPED
Start: 2021-12-23 | End: 2022-07-13

## 2021-12-23 RX ORDER — ONDANSETRON 4 MG/1
4 TABLET, ORALLY DISINTEGRATING ORAL EVERY 8 HOURS PRN
Qty: 30 TABLET | Refills: 0 | Status: SHIPPED
Start: 2021-12-23 | End: 2022-04-26

## 2021-12-23 RX ORDER — DIPHENHYDRAMINE HCL 25 MG
25 TABLET ORAL EVERY 6 HOURS PRN
Qty: 90 TABLET | Refills: 5 | Status: SHIPPED | OUTPATIENT
Start: 2021-12-23 | End: 2022-01-22

## 2021-12-23 RX ORDER — SUMATRIPTAN 50 MG/1
50 TABLET, FILM COATED ORAL
Qty: 9 TABLET | Refills: 5 | Status: SHIPPED
Start: 2021-12-23 | End: 2022-07-13

## 2021-12-23 RX ORDER — HYDROCORTISONE 20 MG/1
TABLET ORAL
Qty: 135 TABLET | Refills: 2 | Status: SHIPPED
Start: 2021-12-23 | End: 2022-07-13

## 2021-12-23 SDOH — ECONOMIC STABILITY: FOOD INSECURITY: WITHIN THE PAST 12 MONTHS, YOU WORRIED THAT YOUR FOOD WOULD RUN OUT BEFORE YOU GOT MONEY TO BUY MORE.: NEVER TRUE

## 2021-12-23 SDOH — ECONOMIC STABILITY: FOOD INSECURITY: WITHIN THE PAST 12 MONTHS, THE FOOD YOU BOUGHT JUST DIDN'T LAST AND YOU DIDN'T HAVE MONEY TO GET MORE.: NEVER TRUE

## 2021-12-23 ASSESSMENT — PATIENT HEALTH QUESTIONNAIRE - PHQ9
2. FEELING DOWN, DEPRESSED OR HOPELESS: 0
1. LITTLE INTEREST OR PLEASURE IN DOING THINGS: 0
SUM OF ALL RESPONSES TO PHQ9 QUESTIONS 1 & 2: 0
SUM OF ALL RESPONSES TO PHQ QUESTIONS 1-9: 0

## 2021-12-23 ASSESSMENT — SOCIAL DETERMINANTS OF HEALTH (SDOH): HOW HARD IS IT FOR YOU TO PAY FOR THE VERY BASICS LIKE FOOD, HOUSING, MEDICAL CARE, AND HEATING?: NOT HARD AT ALL
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
## 2022-01-18 DIAGNOSIS — Z01.812 PRE-PROCEDURE LAB EXAM: Primary | ICD-10-CM

## 2022-04-18 ENCOUNTER — APPOINTMENT (OUTPATIENT)
Dept: GENERAL RADIOLOGY | Age: 52
End: 2022-04-18
Payer: MEDICARE

## 2022-04-18 ENCOUNTER — HOSPITAL ENCOUNTER (EMERGENCY)
Age: 52
Discharge: HOME | End: 2022-04-18
Attending: STUDENT IN AN ORGANIZED HEALTH CARE EDUCATION/TRAINING PROGRAM
Payer: MEDICARE

## 2022-04-18 ENCOUNTER — APPOINTMENT (OUTPATIENT)
Dept: CT IMAGING | Age: 52
End: 2022-04-18
Payer: MEDICARE

## 2022-04-18 VITALS
HEART RATE: 58 BPM | RESPIRATION RATE: 18 BRPM | SYSTOLIC BLOOD PRESSURE: 97 MMHG | TEMPERATURE: 97.6 F | BODY MASS INDEX: 28.72 KG/M2 | DIASTOLIC BLOOD PRESSURE: 65 MMHG | OXYGEN SATURATION: 98 % | HEIGHT: 75 IN

## 2022-04-18 DIAGNOSIS — I31.39 PERICARDIAL EFFUSION: ICD-10-CM

## 2022-04-18 DIAGNOSIS — R51.9 CHRONIC DAILY HEADACHE: ICD-10-CM

## 2022-04-18 DIAGNOSIS — E03.8 CENTRAL HYPOTHYROIDISM: ICD-10-CM

## 2022-04-18 DIAGNOSIS — R91.1 LUNG NODULE: ICD-10-CM

## 2022-04-18 DIAGNOSIS — R51.9 NONINTRACTABLE HEADACHE, UNSPECIFIED CHRONICITY PATTERN, UNSPECIFIED HEADACHE TYPE: ICD-10-CM

## 2022-04-18 DIAGNOSIS — R10.9 ABDOMINAL PAIN, UNSPECIFIED ABDOMINAL LOCATION: Primary | ICD-10-CM

## 2022-04-18 DIAGNOSIS — E23.0 PANHYPOPITUITARISM (HCC): ICD-10-CM

## 2022-04-18 DIAGNOSIS — R07.9 CHEST PAIN, UNSPECIFIED TYPE: ICD-10-CM

## 2022-04-18 LAB
ALBUMIN SERPL-MCNC: 4.4 G/DL (ref 3.5–5.2)
ALP BLD-CCNC: 43 U/L (ref 40–129)
ALT SERPL-CCNC: 11 U/L (ref 0–40)
ANION GAP SERPL CALCULATED.3IONS-SCNC: 11 MMOL/L (ref 7–16)
AST SERPL-CCNC: 14 U/L (ref 0–39)
BACTERIA: ABNORMAL /HPF
BASOPHILS ABSOLUTE: 0.08 E9/L (ref 0–0.2)
BASOPHILS RELATIVE PERCENT: 0.9 % (ref 0–2)
BILIRUB SERPL-MCNC: 0.3 MG/DL (ref 0–1.2)
BILIRUBIN URINE: NEGATIVE
BLOOD, URINE: ABNORMAL
BUN BLDV-MCNC: 8 MG/DL (ref 6–20)
CALCIUM SERPL-MCNC: 10 MG/DL (ref 8.6–10.2)
CHLORIDE BLD-SCNC: 94 MMOL/L (ref 98–107)
CLARITY: CLEAR
CO2: 24 MMOL/L (ref 22–29)
COLOR: YELLOW
CREAT SERPL-MCNC: 1.1 MG/DL (ref 0.7–1.2)
EOSINOPHILS ABSOLUTE: 0.29 E9/L (ref 0.05–0.5)
EOSINOPHILS RELATIVE PERCENT: 3.5 % (ref 0–6)
EPITHELIAL CELLS, UA: ABNORMAL /HPF
GFR AFRICAN AMERICAN: >60
GFR NON-AFRICAN AMERICAN: >60 ML/MIN/1.73
GLUCOSE BLD-MCNC: 81 MG/DL (ref 74–99)
GLUCOSE URINE: NEGATIVE MG/DL
HCT VFR BLD CALC: 39.8 % (ref 37–54)
HEMOGLOBIN: 13.4 G/DL (ref 12.5–16.5)
KETONES, URINE: NEGATIVE MG/DL
LACTIC ACID: 1.1 MMOL/L (ref 0.5–2.2)
LEUKOCYTE ESTERASE, URINE: ABNORMAL
LIPASE: 20 U/L (ref 13–60)
LYMPHOCYTES ABSOLUTE: 5.29 E9/L (ref 1.5–4)
LYMPHOCYTES RELATIVE PERCENT: 62.6 % (ref 20–42)
MAGNESIUM: 2.2 MG/DL (ref 1.6–2.6)
MCH RBC QN AUTO: 29.5 PG (ref 26–35)
MCHC RBC AUTO-ENTMCNC: 33.7 % (ref 32–34.5)
MCV RBC AUTO: 87.7 FL (ref 80–99.9)
MONOCYTES ABSOLUTE: 0.67 E9/L (ref 0.1–0.95)
MONOCYTES RELATIVE PERCENT: 7.8 % (ref 2–12)
NEUTROPHILS ABSOLUTE: 2.1 E9/L (ref 1.8–7.3)
NEUTROPHILS RELATIVE PERCENT: 25.2 % (ref 43–80)
NITRITE, URINE: NEGATIVE
PDW BLD-RTO: 12.7 FL (ref 11.5–15)
PH UA: 6 (ref 5–9)
PLATELET # BLD: 288 E9/L (ref 130–450)
PMV BLD AUTO: 9.2 FL (ref 7–12)
POLYCHROMASIA: ABNORMAL
POTASSIUM SERPL-SCNC: 4 MMOL/L (ref 3.5–5)
PROTEIN UA: NEGATIVE MG/DL
RBC # BLD: 4.54 E12/L (ref 3.8–5.8)
RBC UA: ABNORMAL /HPF (ref 0–2)
SODIUM BLD-SCNC: 129 MMOL/L (ref 132–146)
SPECIFIC GRAVITY UA: 1.01 (ref 1–1.03)
TOTAL PROTEIN: 7.7 G/DL (ref 6.4–8.3)
TROPONIN, HIGH SENSITIVITY: 10 NG/L (ref 0–11)
UROBILINOGEN, URINE: 0.2 E.U./DL
WBC # BLD: 8.4 E9/L (ref 4.5–11.5)
WBC UA: ABNORMAL /HPF (ref 0–5)

## 2022-04-18 PROCEDURE — 96374 THER/PROPH/DIAG INJ IV PUSH: CPT

## 2022-04-18 PROCEDURE — 84484 ASSAY OF TROPONIN QUANT: CPT

## 2022-04-18 PROCEDURE — 71045 X-RAY EXAM CHEST 1 VIEW: CPT

## 2022-04-18 PROCEDURE — 6360000004 HC RX CONTRAST MEDICATION: Performed by: RADIOLOGY

## 2022-04-18 PROCEDURE — 74177 CT ABD & PELVIS W/CONTRAST: CPT

## 2022-04-18 PROCEDURE — 71275 CT ANGIOGRAPHY CHEST: CPT

## 2022-04-18 PROCEDURE — 85025 COMPLETE CBC W/AUTO DIFF WBC: CPT

## 2022-04-18 PROCEDURE — 99285 EMERGENCY DEPT VISIT HI MDM: CPT

## 2022-04-18 PROCEDURE — 6360000002 HC RX W HCPCS: Performed by: STUDENT IN AN ORGANIZED HEALTH CARE EDUCATION/TRAINING PROGRAM

## 2022-04-18 PROCEDURE — 83690 ASSAY OF LIPASE: CPT

## 2022-04-18 PROCEDURE — 83605 ASSAY OF LACTIC ACID: CPT

## 2022-04-18 PROCEDURE — 70450 CT HEAD/BRAIN W/O DYE: CPT

## 2022-04-18 PROCEDURE — 96375 TX/PRO/DX INJ NEW DRUG ADDON: CPT

## 2022-04-18 PROCEDURE — 80053 COMPREHEN METABOLIC PANEL: CPT

## 2022-04-18 PROCEDURE — 83735 ASSAY OF MAGNESIUM: CPT

## 2022-04-18 PROCEDURE — 81001 URINALYSIS AUTO W/SCOPE: CPT

## 2022-04-18 PROCEDURE — 93005 ELECTROCARDIOGRAM TRACING: CPT | Performed by: PHYSICIAN ASSISTANT

## 2022-04-18 RX ORDER — DIPHENHYDRAMINE HYDROCHLORIDE 50 MG/ML
25 INJECTION INTRAMUSCULAR; INTRAVENOUS ONCE
Status: COMPLETED | OUTPATIENT
Start: 2022-04-18 | End: 2022-04-18

## 2022-04-18 RX ORDER — PROCHLORPERAZINE EDISYLATE 5 MG/ML
10 INJECTION INTRAMUSCULAR; INTRAVENOUS ONCE
Status: COMPLETED | OUTPATIENT
Start: 2022-04-18 | End: 2022-04-18

## 2022-04-18 RX ORDER — SODIUM CHLORIDE 0.9 % (FLUSH) 0.9 %
10 SYRINGE (ML) INJECTION PRN
Status: DISCONTINUED | OUTPATIENT
Start: 2022-04-18 | End: 2022-04-19 | Stop reason: HOSPADM

## 2022-04-18 RX ADMIN — DIPHENHYDRAMINE HYDROCHLORIDE 25 MG: 50 INJECTION, SOLUTION INTRAMUSCULAR; INTRAVENOUS at 15:53

## 2022-04-18 RX ADMIN — PROCHLORPERAZINE EDISYLATE 10 MG: 5 INJECTION INTRAMUSCULAR; INTRAVENOUS at 15:53

## 2022-04-18 RX ADMIN — IOPAMIDOL 90 ML: 755 INJECTION, SOLUTION INTRAVENOUS at 18:30

## 2022-04-18 ASSESSMENT — PAIN SCALES - GENERAL: PAINLEVEL_OUTOF10: 10
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
## 2022-04-18 NOTE — ED NOTES
Attending/MLP to Continue Care      ---END OF FIRST PROVIDER CONTACT ASSESSMENT NOTE---  Electronically signed by SHARON Ritchie   DD: 4/18/22       SHARON Ritchie  04/18/22 9369

## 2022-04-19 LAB
EKG ATRIAL RATE: 57 BPM
EKG P AXIS: 71 DEGREES
EKG P-R INTERVAL: 194 MS
EKG Q-T INTERVAL: 516 MS
EKG QRS DURATION: 162 MS
EKG QTC CALCULATION (BAZETT): 502 MS
EKG R AXIS: -46 DEGREES
EKG T AXIS: 30 DEGREES
EKG VENTRICULAR RATE: 57 BPM

## 2022-04-26 ENCOUNTER — OFFICE VISIT (OUTPATIENT)
Dept: FAMILY MEDICINE CLINIC | Age: 52
End: 2022-04-26
Payer: MEDICARE

## 2022-04-26 VITALS
WEIGHT: 226 LBS | DIASTOLIC BLOOD PRESSURE: 68 MMHG | HEART RATE: 89 BPM | HEIGHT: 75 IN | RESPIRATION RATE: 18 BRPM | BODY MASS INDEX: 28.1 KG/M2 | OXYGEN SATURATION: 97 % | SYSTOLIC BLOOD PRESSURE: 110 MMHG | TEMPERATURE: 97.6 F

## 2022-04-26 DIAGNOSIS — R10.84 GENERALIZED ABDOMINAL PAIN: Primary | ICD-10-CM

## 2022-04-26 DIAGNOSIS — R11.0 NAUSEA: ICD-10-CM

## 2022-04-26 DIAGNOSIS — N52.1 ERECTILE DYSFUNCTION DUE TO DISEASES CLASSIFIED ELSEWHERE: ICD-10-CM

## 2022-04-26 PROCEDURE — 99213 OFFICE O/P EST LOW 20 MIN: CPT | Performed by: INTERNAL MEDICINE

## 2022-04-26 RX ORDER — ONDANSETRON 4 MG/1
4 TABLET, ORALLY DISINTEGRATING ORAL 3 TIMES DAILY PRN
Qty: 30 TABLET | Refills: 2 | Status: SHIPPED
Start: 2022-04-26 | End: 2022-07-13

## 2022-04-26 NOTE — PROGRESS NOTES
Formerly named [facility] PRIMARY CARE  [address] [address]  Dept: [phone]  Dept Fax: [phone]     NAME: Kiana Renae        :  1970        MRN:  [id number]    Chief Complaint   Patient presents with    ED Follow-up     Seen eR 22 for abd pain and weakness / feels no improvement. Subjective     History of Present Illness  Kiana Renae is a 46 y.o. male who presents today for an acute visit for ED follow up for abdominal pain. Patient is still having intermittent abdominal pain  Carafate made his abdominal pain worse. Starting trying home remedies - chewing on garlic, this hasn't made his pain improve. Pain is described as gas discomfort and bloating. Patient reports that he has previously been told he has a \"hair line tear\" in stomach diagnosed with EGD. This was several years ago. No follow up scope was ever done. Review of Systems  Please see HPI above. All bolded are positive.   Gen: fever, chills, fatigue, weakness, diaphoresis, unintentional weight change  Head: headache, vision change, hearing loss  Chest: chest pain/heaviness, palpitations  Lungs: shortness of breath, wheezing, coughing, hemoptysis  Abdomen: abdominal pain, nausea, vomiting, diarrhea, constipation, melena, hematochezia, hematemesis, loss of appetite  Extremities: lower extremity edema, myalgias, arthralgias  Urinary: dysuria, hematuria, weak flow, increase in frequency  Neurologic: lightheadedness, dizziness, confusion, syncope  Endocrine: polydipsia, polyuria, heat or cold intolerance  Psychiatric: depression, suicidal ideation, anxiety  Derm: Rashes, ulcers, burns    Home Medications:  Current Outpatient Medications   Medication Sig Dispense Refill    ondansetron (ZOFRAN-ODT) 4 MG disintegrating tablet Take 1 tablet by mouth 3 times daily as needed for Nausea or Vomiting 30 tablet 2    levothyroxine (SYNTHROID) 137 MCG tablet Take 1 tablet by mouth daily 30 minutes before you eat or drink in the AM, with small sip of water. 90 tablet 2    hydrocortisone (CORTEF) 20 MG tablet TAKE 1 TABLET BY MOUTH EVERY MORNING AND 1/2 TABLET BY MOUTH AT 3PM 135 tablet 2    desmopressin (DDAVP) 0.2 MG tablet take 1 tablet by mouth every morning and 1 AND 1/2 tablets at bedtime 220 tablet 2    pantoprazole (PROTONIX) 40 MG tablet Take 1 tablet by mouth daily 90 tablet 1    loratadine (CLARITIN) 10 MG tablet Take 1 tablet by mouth daily 90 tablet 1    SUMAtriptan (IMITREX) 50 MG tablet Take 1 tablet by mouth once as needed for Migraine 9 tablet 5    Testosterone (ANDROGEL) 20.25 MG/ACT (1.62%) GEL gel ALTERNATE APPLYING 2 PUMPS TO ONE UPPER ARM EVERY MORNING 150 g 3     No current facility-administered medications for this visit. Allergies: Allergies   Allergen Reactions    Reglan [Metoclopramide] Other (See Comments)       Objective     Vitals:    04/26/22 1448   BP: 110/68   Pulse: 89   Resp: 18   Temp: 97.6 °F (36.4 °C)   TempSrc: Temporal   SpO2: 97%   Weight: 226 lb (102.5 kg)   Height: 6' 3\" (1.905 m)        Physical Exam  General: Awake, alert, and oriented to person, place, time, and purpose, appears stated age and cooperative, No acute distress  Head: Normocephalic, atraumatic  Eyes: conjunctivae/corneas clear, EOMI  Mouth: Mucous membranes moist with no pharyngeal exudate or erythema  Neck: no JVD, no adenopathy, no carotid bruit, supple, symmetrical, trachea midline  Back: symmetric, ROM normal, No CVA tenderness. Lungs: clear to auscultation bilaterally without wheezes, rales, or rhonchi  Heart: regular rate and rhythm, S1, S2 normal, no murmur, click, rub or gallop  Abdomen: soft, non-tender; bowel sounds normal; no masses,  no organomegaly  Extremities: atraumatic, no cyanosis, no edema  Skin: color, texture, turgor within normal limits.  No rashes or lesions or normal  Neurologic:speech appropriate, moves all 4 extremities, normal muscle strength and tone, CN 2-12 grossly intact      Assessment and Plan     Patient is a 46 y.o. male who presented to the office for an acute visit. Kerry Oquendo was seen today for ed follow-up. Diagnoses and all orders for this visit:    Generalized abdominal pain  -     AFL - Raúl Lopez MD, Gastroenterology, [place]    Nausea  -     ondansetron (ZOFRAN-ODT) 4 MG disintegrating tablet; Take 1 tablet by mouth 3 times daily as needed for Nausea or Vomiting    Erectile dysfunction due to diseases classified elsewhere    - patient advised to discuss his erectile dysfunction with his endocrinologist as he believes it to be directly related to his hormone replacement therapy. He has not followed up with endocrinology for nearly a year and has no follow up scheduled. Educational materials and/or home exercises printed for patient's review and were included in patient instructions on his/her After Visit Summary and given to patient at the end of visit. Counseled regarding above diagnosis, including possible risks and complications, especially if left uncontrolled or untreated. Advised to present to the Emergency Department if symptoms worsen. Counseled regarding the possible side effects, risks, benefits and alternatives to treatment; patient and/or guardian verbalizes understanding, agrees, feels comfortable with and wishes to proceed with above treatment plan. Advised patient to call Bhavesh Pepe new medication issues, and read all Rx info from pharmacy to assure aware of all possible risks and side effects of any medication before taking. Patient verbalizes understanding and agrees with above counseling, assessment and plan. All questions answered.     Eli Gaxiola, DO

## 2022-06-23 ENCOUNTER — OFFICE VISIT (OUTPATIENT)
Dept: FAMILY MEDICINE CLINIC | Age: 52
End: 2022-06-23
Payer: MEDICARE

## 2022-06-23 VITALS
SYSTOLIC BLOOD PRESSURE: 104 MMHG | TEMPERATURE: 97.8 F | HEART RATE: 68 BPM | DIASTOLIC BLOOD PRESSURE: 74 MMHG | OXYGEN SATURATION: 99 % | RESPIRATION RATE: 16 BRPM | HEIGHT: 75 IN | WEIGHT: 206 LBS | BODY MASS INDEX: 25.61 KG/M2

## 2022-06-23 DIAGNOSIS — R10.13 EPIGASTRIC PAIN: Primary | ICD-10-CM

## 2022-06-23 PROCEDURE — 99213 OFFICE O/P EST LOW 20 MIN: CPT

## 2022-06-23 ASSESSMENT — PATIENT HEALTH QUESTIONNAIRE - PHQ9
1. LITTLE INTEREST OR PLEASURE IN DOING THINGS: 0
SUM OF ALL RESPONSES TO PHQ QUESTIONS 1-9: 0
SUM OF ALL RESPONSES TO PHQ9 QUESTIONS 1 & 2: 0
SUM OF ALL RESPONSES TO PHQ QUESTIONS 1-9: 0
SUM OF ALL RESPONSES TO PHQ QUESTIONS 1-9: 0
2. FEELING DOWN, DEPRESSED OR HOPELESS: 0
SUM OF ALL RESPONSES TO PHQ QUESTIONS 1-9: 0

## 2022-06-23 NOTE — PROGRESS NOTES
Chief Complaint       Abdominal Pain (possible ulcer, last evening got out of hand with pain)    History of Present Illness   Source of history provided by:  patient. Stefan Alicia is a 46 y.o. old male presenting to the walk in clinic for evaluation of epigastric abdominal pain which has been chronic issue for him. He was seen by PCP and referred to GI in which upper/lower scopes were performed. Evidence of gastritis and h.pylori testing was negative. He request I need amoxicillin for my stomach today. \"  States the pain does not radiate. Patient describes the pain as sharp, intermittent. . Denies associated nausea, vomiting, or diarrhea. Has tried taking nothing OTC without symptomatic relief. Denies any fever, chills, CP, SOB, dysuria, hematuria, change in stool color/consistency, melena, hematemesis, coffee-ground emesis, HA, sore throat, rash, or lethargy. ROS    Unless otherwise stated in this report or unable to obtain because of the patient's clinical or mental status as evidenced by the medical record, this patients's positive and negative responses for Review of Systems, constitutional, psych, eyes, ENT, cardiovascular, respiratory, gastrointestinal, neurological, genitourinary, musculoskeletal, integument systems and systems related to the presenting problem are either stated in the preceding or were not pertinent or were negative for the symptoms and/or complaints related to the medical problem. Physical Exam         VS:  /74   Pulse 68   Temp 97.8 °F (36.6 °C) (Infrared)   Resp 16   Ht 6' 3\" (1.905 m)   Wt 206 lb (93.4 kg)   SpO2 99%   BMI 25.75 kg/m²    Oxygen Saturation Interpretation: Normal.    General Appearance/Constitutional:  Alert, development consistent with age, NAD. HEENT:  NCAT. Lungs: CTAB without wheezing, rales, or rhonchi. Heart:  RRR, no murmurs, rubs, or gallops. Abdomen:  General Appearance: No obvious trauma or bruising. No rashes or lesions. Bowel sounds: BS+x4       Distension:  No distension. Tenderness: Mild to epigastric region, non-distended without guarding, rebound, or rigidity. Liver/Spleen: Non-tender and no hepatosplenomegaly. Pulsatile Mass: None noted. Back: CVA Tenderness: No bilateral tenderness or bruising. Skin:  Normal turgor. Warm, dry, without visible rash, unless noted elsewhere. Neurological:  Orientation age-appropriate. Motor functions intact. Lab / Imaging Results   (All laboratory and radiology results have been personally reviewed by myself)  Labs:  No results found for this visit on 06/23/22. Imaging: All Radiology results interpreted by Radiologist unless otherwise noted. Assessment / Plan     Impression(s):  Pallavi Tony was seen today for abdominal pain. Diagnoses and all orders for this visit:    Epigastric pain      Disposition:  Disposition: Discharge to home. It was explained to patient that he would need to see his PCP for follow-up on his GI procedures. There was no evidence of H.pylori, amoxicillin not necessary for his symptoms. Due to tenderness in epigastric region he was advised to go to ER for evaluation for possible perforation. He refused at this time. He is stable and not reporting any blood in stool. Advised to continue his GERD medications. Avoid spicy foods, caffeine, and alcohol to prevent exacerbation. Advise f/u with PCP in 5-7 days for recheck and further workup as indicated. ED sooner if symptoms worsen or change. ED immediately with the development of fever, shaking chills, body aches, severe/worsening pain, lethargy, melena, hematochezia, hematemesis, coffee-ground emesis, CP, or SOB. Pt is in agreement with this care plan. All questions answered. SHARON Ramos    **This report was transcribed using voice recognition software. Every effort was made to ensure accuracy; however, inadvertent computerized transcription errors may be present.

## 2022-07-12 ENCOUNTER — APPOINTMENT (OUTPATIENT)
Dept: GENERAL RADIOLOGY | Age: 52
DRG: 641 | End: 2022-07-12
Payer: MEDICARE

## 2022-07-12 ENCOUNTER — HOSPITAL ENCOUNTER (INPATIENT)
Age: 52
LOS: 2 days | Discharge: HOME | DRG: 641 | End: 2022-07-14
Attending: EMERGENCY MEDICINE | Admitting: FAMILY MEDICINE
Payer: MEDICARE

## 2022-07-12 DIAGNOSIS — E87.5 HYPERKALEMIA: Primary | ICD-10-CM

## 2022-07-12 DIAGNOSIS — Z87.898 HISTORY OF PITUITARY TUMOR: ICD-10-CM

## 2022-07-12 DIAGNOSIS — R33.9 URINARY RETENTION: ICD-10-CM

## 2022-07-12 DIAGNOSIS — N17.9 AKI (ACUTE KIDNEY INJURY) (HCC): ICD-10-CM

## 2022-07-12 LAB
ABO/RH: NORMAL
ACETAMINOPHEN LEVEL: <5 MCG/ML (ref 10–30)
ALBUMIN SERPL-MCNC: 4.9 G/DL (ref 3.5–5.2)
ALP BLD-CCNC: 43 U/L (ref 40–129)
ALT SERPL-CCNC: 8 U/L (ref 0–40)
AMPHETAMINE SCREEN, URINE: NOT DETECTED
ANION GAP SERPL CALCULATED.3IONS-SCNC: 11 MMOL/L (ref 7–16)
ANTIBODY SCREEN: NORMAL
APTT: 44.1 SEC (ref 24.5–35.1)
AST SERPL-CCNC: 18 U/L (ref 0–39)
B.E.: -2.2 MMOL/L (ref -3–3)
BACTERIA: ABNORMAL /HPF
BARBITURATE SCREEN URINE: NOT DETECTED
BASOPHILS ABSOLUTE: 0.04 E9/L (ref 0–0.2)
BASOPHILS RELATIVE PERCENT: 0.5 % (ref 0–2)
BENZODIAZEPINE SCREEN, URINE: NOT DETECTED
BILIRUB SERPL-MCNC: 0.4 MG/DL (ref 0–1.2)
BILIRUBIN URINE: NEGATIVE
BLOOD, URINE: ABNORMAL
BUN BLDV-MCNC: 6 MG/DL (ref 6–20)
CALCIUM SERPL-MCNC: 10 MG/DL (ref 8.6–10.2)
CANNABINOID SCREEN URINE: POSITIVE
CHLORIDE BLD-SCNC: 99 MMOL/L (ref 98–107)
CHLORIDE URINE RANDOM: 47 MMOL/L
CHP ED QC CHECK: NORMAL
CLARITY: CLEAR
CO2: 20 MMOL/L (ref 22–29)
COCAINE METABOLITE SCREEN URINE: NOT DETECTED
COHB: 4.6 % (ref 0–1.5)
COLOR: YELLOW
CREAT SERPL-MCNC: 1.6 MG/DL (ref 0.7–1.2)
CREATININE URINE: 20 MG/DL (ref 40–278)
CRITICAL: ABNORMAL
DATE ANALYZED: ABNORMAL
DATE OF COLLECTION: ABNORMAL
EOSINOPHILS ABSOLUTE: 0.15 E9/L (ref 0.05–0.5)
EOSINOPHILS RELATIVE PERCENT: 1.8 % (ref 0–6)
ETHANOL: <10 MG/DL (ref 0–0.08)
FENTANYL SCREEN, URINE: NOT DETECTED
GFR AFRICAN AMERICAN: 55
GFR AFRICAN AMERICAN: >60
GFR AFRICAN AMERICAN: >60
GFR NON-AFRICAN AMERICAN: 55 ML/MIN/1.73
GFR NON-AFRICAN AMERICAN: >60 ML/MIN/1.73
GFR NON-AFRICAN AMERICAN: >60 ML/MIN/1.73
GLUCOSE BLD-MCNC: 35 MG/DL (ref 74–99)
GLUCOSE BLD-MCNC: 48 MG/DL
GLUCOSE BLD-MCNC: 74 MG/DL (ref 74–99)
GLUCOSE BLD-MCNC: 94 MG/DL (ref 74–99)
GLUCOSE URINE: NEGATIVE MG/DL
HCO3: 18.3 MMOL/L (ref 22–26)
HCT VFR BLD CALC: 37.1 % (ref 37–54)
HEMOGLOBIN: 12.4 G/DL (ref 12.5–16.5)
HHB: 1.7 % (ref 0–5)
IMMATURE GRANULOCYTES #: 0.02 E9/L
IMMATURE GRANULOCYTES %: 0.2 % (ref 0–5)
INR BLD: 1.1
KETONES, URINE: NEGATIVE MG/DL
LAB: ABNORMAL
LACTIC ACID: 1.9 MMOL/L (ref 0.5–2.2)
LEUKOCYTE ESTERASE, URINE: NEGATIVE
LYMPHOCYTES ABSOLUTE: 4.88 E9/L (ref 1.5–4)
LYMPHOCYTES RELATIVE PERCENT: 57.9 % (ref 20–42)
Lab: ABNORMAL
Lab: ABNORMAL
MAGNESIUM: 2.5 MG/DL (ref 1.6–2.6)
MCH RBC QN AUTO: 28.9 PG (ref 26–35)
MCHC RBC AUTO-ENTMCNC: 33.4 % (ref 32–34.5)
MCV RBC AUTO: 86.5 FL (ref 80–99.9)
METER GLUCOSE: 123 MG/DL (ref 74–99)
METER GLUCOSE: 191 MG/DL (ref 74–99)
METER GLUCOSE: 381 MG/DL (ref 74–99)
METER GLUCOSE: 48 MG/DL (ref 74–99)
METER GLUCOSE: 56 MG/DL (ref 74–99)
METER GLUCOSE: 92 MG/DL (ref 74–99)
METHADONE SCREEN, URINE: NOT DETECTED
METHB: 0.3 % (ref 0–1.5)
MICROALBUMIN UR-MCNC: 34.1 MG/L
MICROALBUMIN/CREAT UR-RTO: 170.5 (ref 0–30)
MODE: ABNORMAL
MONOCYTES ABSOLUTE: 0.3 E9/L (ref 0.1–0.95)
MONOCYTES RELATIVE PERCENT: 3.6 % (ref 2–12)
NEUTROPHILS ABSOLUTE: 3.04 E9/L (ref 1.8–7.3)
NEUTROPHILS RELATIVE PERCENT: 36 % (ref 43–80)
NITRITE, URINE: NEGATIVE
O2 CONTENT: 18.3 ML/DL
O2 SATURATION: 98.2 % (ref 92–98.5)
O2HB: 93.4 % (ref 94–97)
OPERATOR ID: ABNORMAL
OPIATE SCREEN URINE: NOT DETECTED
OXYCODONE URINE: NOT DETECTED
PATIENT TEMP: 37 C
PCO2: 22.1 MMHG (ref 35–45)
PDW BLD-RTO: 15.2 FL (ref 11.5–15)
PERFORMED ON: ABNORMAL
PERFORMED ON: ABNORMAL
PH BLOOD GAS: 7.54 (ref 7.35–7.45)
PH UA: 8.5 (ref 5–9)
PHENCYCLIDINE SCREEN URINE: NOT DETECTED
PLATELET # BLD: 268 E9/L (ref 130–450)
PMV BLD AUTO: 9.6 FL (ref 7–12)
PO2: 111.7 MMHG (ref 75–100)
POC CHLORIDE: 106 MMOL/L (ref 100–108)
POC CHLORIDE: 117 MMOL/L (ref 100–108)
POC CREATININE: 0.9 MG/DL (ref 0.7–1.2)
POC CREATININE: 1.2 MG/DL (ref 0.7–1.2)
POC POTASSIUM: 3.4 MMOL/L (ref 3.5–5)
POC POTASSIUM: 6.4 MMOL/L (ref 3.5–5)
POC SODIUM: 137 MMOL/L (ref 132–146)
POC SODIUM: 146 MMOL/L (ref 132–146)
POTASSIUM SERPL-SCNC: 4.2 MMOL/L (ref 3.5–5)
POTASSIUM SERPL-SCNC: 7.53 MMOL/L (ref 3.5–5)
POTASSIUM SERPL-SCNC: 7.8 MMOL/L (ref 3.5–5)
POTASSIUM, UR: 33.5 MMOL/L
PRO-BNP: 55 PG/ML (ref 0–125)
PROTEIN UA: NEGATIVE MG/DL
PROTHROMBIN TIME: 12 SEC (ref 9.3–12.4)
RBC # BLD: 4.29 E12/L (ref 3.8–5.8)
RBC UA: ABNORMAL /HPF (ref 0–2)
SALICYLATE, SERUM: <0.3 MG/DL (ref 0–30)
SODIUM BLD-SCNC: 130 MMOL/L (ref 132–146)
SODIUM URINE: 68 MMOL/L
SOURCE, BLOOD GAS: ABNORMAL
SPECIFIC GRAVITY UA: 1.01 (ref 1–1.03)
THB: 13.8 G/DL (ref 11.5–16.5)
TIME ANALYZED: 1859
TOTAL CK: 258 U/L (ref 20–200)
TOTAL PROTEIN: 8.4 G/DL (ref 6.4–8.3)
TRICYCLIC ANTIDEPRESSANTS SCREEN SERUM: NEGATIVE NG/ML
TROPONIN, HIGH SENSITIVITY: 9 NG/L (ref 0–11)
TSH SERPL DL<=0.05 MIU/L-ACNC: 0.05 UIU/ML (ref 0.27–4.2)
UROBILINOGEN, URINE: 0.2 E.U./DL
WBC # BLD: 8.4 E9/L (ref 4.5–11.5)
WBC UA: ABNORMAL /HPF (ref 0–5)

## 2022-07-12 PROCEDURE — 2580000003 HC RX 258

## 2022-07-12 PROCEDURE — 2500000003 HC RX 250 WO HCPCS: Performed by: FAMILY MEDICINE

## 2022-07-12 PROCEDURE — 86901 BLOOD TYPING SEROLOGIC RH(D): CPT

## 2022-07-12 PROCEDURE — 83935 ASSAY OF URINE OSMOLALITY: CPT

## 2022-07-12 PROCEDURE — 99285 EMERGENCY DEPT VISIT HI MDM: CPT

## 2022-07-12 PROCEDURE — 84132 ASSAY OF SERUM POTASSIUM: CPT

## 2022-07-12 PROCEDURE — 84300 ASSAY OF URINE SODIUM: CPT

## 2022-07-12 PROCEDURE — 85610 PROTHROMBIN TIME: CPT

## 2022-07-12 PROCEDURE — 86850 RBC ANTIBODY SCREEN: CPT

## 2022-07-12 PROCEDURE — 1200000000 HC SEMI PRIVATE

## 2022-07-12 PROCEDURE — 86900 BLOOD TYPING SEROLOGIC ABO: CPT

## 2022-07-12 PROCEDURE — 71045 X-RAY EXAM CHEST 1 VIEW: CPT

## 2022-07-12 PROCEDURE — 83605 ASSAY OF LACTIC ACID: CPT

## 2022-07-12 PROCEDURE — 84443 ASSAY THYROID STIM HORMONE: CPT

## 2022-07-12 PROCEDURE — 84133 ASSAY OF URINE POTASSIUM: CPT

## 2022-07-12 PROCEDURE — 81001 URINALYSIS AUTO W/SCOPE: CPT

## 2022-07-12 PROCEDURE — 84484 ASSAY OF TROPONIN QUANT: CPT

## 2022-07-12 PROCEDURE — 84439 ASSAY OF FREE THYROXINE: CPT

## 2022-07-12 PROCEDURE — 2500000003 HC RX 250 WO HCPCS: Performed by: STUDENT IN AN ORGANIZED HEALTH CARE EDUCATION/TRAINING PROGRAM

## 2022-07-12 PROCEDURE — 82044 UR ALBUMIN SEMIQUANTITATIVE: CPT

## 2022-07-12 PROCEDURE — 82805 BLOOD GASES W/O2 SATURATION: CPT

## 2022-07-12 PROCEDURE — 83930 ASSAY OF BLOOD OSMOLALITY: CPT

## 2022-07-12 PROCEDURE — 82570 ASSAY OF URINE CREATININE: CPT

## 2022-07-12 PROCEDURE — 51702 INSERT TEMP BLADDER CATH: CPT

## 2022-07-12 PROCEDURE — 93005 ELECTROCARDIOGRAM TRACING: CPT | Performed by: STUDENT IN AN ORGANIZED HEALTH CARE EDUCATION/TRAINING PROGRAM

## 2022-07-12 PROCEDURE — 82436 ASSAY OF URINE CHLORIDE: CPT

## 2022-07-12 PROCEDURE — 80179 DRUG ASSAY SALICYLATE: CPT

## 2022-07-12 PROCEDURE — 85025 COMPLETE CBC W/AUTO DIFF WBC: CPT

## 2022-07-12 PROCEDURE — 6360000002 HC RX W HCPCS

## 2022-07-12 PROCEDURE — 6370000000 HC RX 637 (ALT 250 FOR IP): Performed by: EMERGENCY MEDICINE

## 2022-07-12 PROCEDURE — 6360000002 HC RX W HCPCS: Performed by: STUDENT IN AN ORGANIZED HEALTH CARE EDUCATION/TRAINING PROGRAM

## 2022-07-12 PROCEDURE — 80053 COMPREHEN METABOLIC PANEL: CPT

## 2022-07-12 PROCEDURE — 87088 URINE BACTERIA CULTURE: CPT

## 2022-07-12 PROCEDURE — 85730 THROMBOPLASTIN TIME PARTIAL: CPT

## 2022-07-12 PROCEDURE — 82435 ASSAY OF BLOOD CHLORIDE: CPT

## 2022-07-12 PROCEDURE — 2580000003 HC RX 258: Performed by: STUDENT IN AN ORGANIZED HEALTH CARE EDUCATION/TRAINING PROGRAM

## 2022-07-12 PROCEDURE — 83880 ASSAY OF NATRIURETIC PEPTIDE: CPT

## 2022-07-12 PROCEDURE — 6370000000 HC RX 637 (ALT 250 FOR IP): Performed by: STUDENT IN AN ORGANIZED HEALTH CARE EDUCATION/TRAINING PROGRAM

## 2022-07-12 PROCEDURE — 82947 ASSAY GLUCOSE BLOOD QUANT: CPT

## 2022-07-12 PROCEDURE — 82962 GLUCOSE BLOOD TEST: CPT

## 2022-07-12 PROCEDURE — 83735 ASSAY OF MAGNESIUM: CPT

## 2022-07-12 PROCEDURE — 80307 DRUG TEST PRSMV CHEM ANLYZR: CPT

## 2022-07-12 PROCEDURE — 2500000003 HC RX 250 WO HCPCS

## 2022-07-12 PROCEDURE — 2500000003 HC RX 250 WO HCPCS: Performed by: EMERGENCY MEDICINE

## 2022-07-12 PROCEDURE — 80143 DRUG ASSAY ACETAMINOPHEN: CPT

## 2022-07-12 PROCEDURE — 96374 THER/PROPH/DIAG INJ IV PUSH: CPT

## 2022-07-12 PROCEDURE — 36415 COLL VENOUS BLD VENIPUNCTURE: CPT

## 2022-07-12 PROCEDURE — 82330 ASSAY OF CALCIUM: CPT

## 2022-07-12 PROCEDURE — 96375 TX/PRO/DX INJ NEW DRUG ADDON: CPT

## 2022-07-12 PROCEDURE — 84295 ASSAY OF SERUM SODIUM: CPT

## 2022-07-12 PROCEDURE — 82077 ASSAY SPEC XCP UR&BREATH IA: CPT

## 2022-07-12 PROCEDURE — 82550 ASSAY OF CK (CPK): CPT

## 2022-07-12 PROCEDURE — 82565 ASSAY OF CREATININE: CPT

## 2022-07-12 RX ORDER — DEXTROSE MONOHYDRATE 25 G/50ML
25 INJECTION, SOLUTION INTRAVENOUS ONCE
Status: COMPLETED | OUTPATIENT
Start: 2022-07-12 | End: 2022-07-12

## 2022-07-12 RX ORDER — SODIUM POLYSTYRENE SULFONATE 15 G/60ML
30 SUSPENSION ORAL; RECTAL ONCE
Status: COMPLETED | OUTPATIENT
Start: 2022-07-12 | End: 2022-07-12

## 2022-07-12 RX ORDER — 0.9 % SODIUM CHLORIDE 0.9 %
1000 INTRAVENOUS SOLUTION INTRAVENOUS ONCE
Status: COMPLETED | OUTPATIENT
Start: 2022-07-12 | End: 2022-07-12

## 2022-07-12 RX ORDER — LEVOTHYROXINE SODIUM ANHYDROUS 100 UG/5ML
200 INJECTION, POWDER, LYOPHILIZED, FOR SOLUTION INTRAVENOUS ONCE
Status: COMPLETED | OUTPATIENT
Start: 2022-07-12 | End: 2022-07-12

## 2022-07-12 RX ORDER — DIPHENHYDRAMINE HYDROCHLORIDE 50 MG/ML
25 INJECTION INTRAMUSCULAR; INTRAVENOUS ONCE
Status: COMPLETED | OUTPATIENT
Start: 2022-07-12 | End: 2022-07-12

## 2022-07-12 RX ORDER — FAMOTIDINE 20 MG/1
20 TABLET, FILM COATED ORAL ONCE
Status: DISCONTINUED | OUTPATIENT
Start: 2022-07-12 | End: 2022-07-14 | Stop reason: HOSPADM

## 2022-07-12 RX ORDER — FUROSEMIDE 10 MG/ML
40 INJECTION INTRAMUSCULAR; INTRAVENOUS ONCE
Status: COMPLETED | OUTPATIENT
Start: 2022-07-12 | End: 2022-07-12

## 2022-07-12 RX ORDER — DEXTROSE MONOHYDRATE 25 G/50ML
25 INJECTION, SOLUTION INTRAVENOUS PRN
Status: DISCONTINUED | OUTPATIENT
Start: 2022-07-12 | End: 2022-07-14 | Stop reason: HOSPADM

## 2022-07-12 RX ORDER — DEXTROSE MONOHYDRATE 50 MG/ML
100 INJECTION, SOLUTION INTRAVENOUS PRN
Status: DISCONTINUED | OUTPATIENT
Start: 2022-07-12 | End: 2022-07-14 | Stop reason: HOSPADM

## 2022-07-12 RX ORDER — FAMOTIDINE 10 MG/ML
INJECTION, SOLUTION INTRAVENOUS
Status: COMPLETED
Start: 2022-07-12 | End: 2022-07-12

## 2022-07-12 RX ORDER — METHYLPREDNISOLONE SODIUM SUCCINATE 125 MG/2ML
125 INJECTION, POWDER, LYOPHILIZED, FOR SOLUTION INTRAMUSCULAR; INTRAVENOUS ONCE
Status: COMPLETED | OUTPATIENT
Start: 2022-07-12 | End: 2022-07-12

## 2022-07-12 RX ORDER — NALOXONE HYDROCHLORIDE 0.4 MG/ML
0.4 INJECTION, SOLUTION INTRAMUSCULAR; INTRAVENOUS; SUBCUTANEOUS ONCE
Status: DISCONTINUED | OUTPATIENT
Start: 2022-07-12 | End: 2022-07-14 | Stop reason: HOSPADM

## 2022-07-12 RX ORDER — NALOXONE HYDROCHLORIDE 1 MG/ML
INJECTION INTRAMUSCULAR; INTRAVENOUS; SUBCUTANEOUS
Status: COMPLETED
Start: 2022-07-12 | End: 2022-07-12

## 2022-07-12 RX ADMIN — SODIUM BICARBONATE 50 MEQ: 84 INJECTION, SOLUTION INTRAVENOUS at 19:14

## 2022-07-12 RX ADMIN — INSULIN HUMAN 5 UNITS: 100 INJECTION, SOLUTION PARENTERAL at 19:25

## 2022-07-12 RX ADMIN — SODIUM CHLORIDE 1000 ML: 9 INJECTION, SOLUTION INTRAVENOUS at 19:28

## 2022-07-12 RX ADMIN — Medication: at 19:10

## 2022-07-12 RX ADMIN — SODIUM CHLORIDE 1000 ML: 9 INJECTION, SOLUTION INTRAVENOUS at 19:07

## 2022-07-12 RX ADMIN — DEXTROSE MONOHYDRATE 25 G: 25 INJECTION, SOLUTION INTRAVENOUS at 21:24

## 2022-07-12 RX ADMIN — SODIUM POLYSTYRENE SULFONATE 30 G: 15 SUSPENSION ORAL; RECTAL at 20:26

## 2022-07-12 RX ADMIN — DIPHENHYDRAMINE HYDROCHLORIDE 25 MG: 50 INJECTION, SOLUTION INTRAMUSCULAR; INTRAVENOUS at 18:36

## 2022-07-12 RX ADMIN — NALOXONE HYDROCHLORIDE 2 MG: 1 INJECTION PARENTERAL at 19:09

## 2022-07-12 RX ADMIN — METHYLPREDNISOLONE SODIUM SUCCINATE 125 MG: 125 INJECTION, POWDER, FOR SOLUTION INTRAMUSCULAR; INTRAVENOUS at 18:35

## 2022-07-12 RX ADMIN — HYDROCORTISONE SODIUM SUCCINATE 100 MG: 100 INJECTION, POWDER, FOR SOLUTION INTRAMUSCULAR; INTRAVENOUS at 19:22

## 2022-07-12 RX ADMIN — LEVOTHYROXINE SODIUM ANHYDROUS 200 MCG: 100 INJECTION, POWDER, LYOPHILIZED, FOR SOLUTION INTRAVENOUS at 23:15

## 2022-07-12 RX ADMIN — DEXTROSE MONOHYDRATE 25 G: 25 INJECTION, SOLUTION INTRAVENOUS at 19:08

## 2022-07-12 RX ADMIN — FAMOTIDINE 20 MG: 10 INJECTION, SOLUTION INTRAVENOUS at 19:09

## 2022-07-12 RX ADMIN — DEXTROSE MONOHYDRATE 250 ML: 100 INJECTION, SOLUTION INTRAVENOUS at 21:09

## 2022-07-12 RX ADMIN — FUROSEMIDE 40 MG: 10 INJECTION, SOLUTION INTRAMUSCULAR; INTRAVENOUS at 19:13

## 2022-07-13 ENCOUNTER — APPOINTMENT (OUTPATIENT)
Dept: CT IMAGING | Age: 52
DRG: 641 | End: 2022-07-13
Payer: MEDICARE

## 2022-07-13 LAB
ALBUMIN SERPL-MCNC: 4.7 G/DL (ref 3.5–5.2)
ALP BLD-CCNC: 44 U/L (ref 40–129)
ALT SERPL-CCNC: 9 U/L (ref 0–40)
ANION GAP SERPL CALCULATED.3IONS-SCNC: 17 MMOL/L (ref 7–16)
AST SERPL-CCNC: 19 U/L (ref 0–39)
BILIRUB SERPL-MCNC: 0.4 MG/DL (ref 0–1.2)
BUN BLDV-MCNC: 10 MG/DL (ref 6–20)
CALCIUM IONIZED: 1.32 MMOL/L (ref 1.15–1.33)
CALCIUM SERPL-MCNC: 9.7 MG/DL (ref 8.6–10.2)
CHLORIDE BLD-SCNC: 99 MMOL/L (ref 98–107)
CHP ED QC CHECK: NORMAL
CO2: 17 MMOL/L (ref 22–29)
CREAT SERPL-MCNC: 1.5 MG/DL (ref 0.7–1.2)
EKG ATRIAL RATE: 58 BPM
EKG P AXIS: 53 DEGREES
EKG P-R INTERVAL: 242 MS
EKG Q-T INTERVAL: 526 MS
EKG QRS DURATION: 120 MS
EKG QTC CALCULATION (BAZETT): 516 MS
EKG R AXIS: -81 DEGREES
EKG T AXIS: 67 DEGREES
EKG VENTRICULAR RATE: 58 BPM
GFR AFRICAN AMERICAN: 60
GFR NON-AFRICAN AMERICAN: 60 ML/MIN/1.73
GLUCOSE BLD-MCNC: 143 MG/DL (ref 74–99)
GLUCOSE BLD-MCNC: 148 MG/DL
METER GLUCOSE: 148 MG/DL (ref 74–99)
OSMOLALITY URINE: 193 MOSM/KG (ref 300–900)
OSMOLALITY: 283 MOSM/KG (ref 285–310)
POTASSIUM REFLEX MAGNESIUM: 4.7 MMOL/L (ref 3.5–5)
SODIUM BLD-SCNC: 133 MMOL/L (ref 132–146)
T4 FREE: <0.1 NG/DL (ref 0.93–1.7)
TOTAL PROTEIN: 8.1 G/DL (ref 6.4–8.3)

## 2022-07-13 PROCEDURE — 6360000002 HC RX W HCPCS: Performed by: FAMILY MEDICINE

## 2022-07-13 PROCEDURE — 6370000000 HC RX 637 (ALT 250 FOR IP): Performed by: FAMILY MEDICINE

## 2022-07-13 PROCEDURE — 2580000003 HC RX 258: Performed by: FAMILY MEDICINE

## 2022-07-13 PROCEDURE — 1200000000 HC SEMI PRIVATE

## 2022-07-13 PROCEDURE — 70450 CT HEAD/BRAIN W/O DYE: CPT

## 2022-07-13 PROCEDURE — 80053 COMPREHEN METABOLIC PANEL: CPT

## 2022-07-13 PROCEDURE — 82962 GLUCOSE BLOOD TEST: CPT

## 2022-07-13 RX ORDER — DESMOPRESSIN ACETATE 0.2 MG/1
0.2 TABLET ORAL DAILY
Status: ON HOLD | COMMUNITY
End: 2022-08-30 | Stop reason: HOSPADM

## 2022-07-13 RX ORDER — FAMOTIDINE 40 MG/1
40 TABLET, FILM COATED ORAL 2 TIMES DAILY
COMMUNITY
End: 2022-11-02

## 2022-07-13 RX ORDER — POLYETHYLENE GLYCOL 3350 17 G/17G
17 POWDER, FOR SOLUTION ORAL DAILY PRN
Status: DISCONTINUED | OUTPATIENT
Start: 2022-07-13 | End: 2022-07-14 | Stop reason: HOSPADM

## 2022-07-13 RX ORDER — HYDROCORTISONE 20 MG/1
20 TABLET ORAL 2 TIMES DAILY
Status: DISCONTINUED | OUTPATIENT
Start: 2022-07-13 | End: 2022-07-14 | Stop reason: HOSPADM

## 2022-07-13 RX ORDER — SODIUM CHLORIDE 9 MG/ML
INJECTION, SOLUTION INTRAVENOUS PRN
Status: DISCONTINUED | OUTPATIENT
Start: 2022-07-13 | End: 2022-07-14 | Stop reason: HOSPADM

## 2022-07-13 RX ORDER — ACETAMINOPHEN 650 MG/1
650 SUPPOSITORY RECTAL EVERY 6 HOURS PRN
Status: DISCONTINUED | OUTPATIENT
Start: 2022-07-13 | End: 2022-07-14 | Stop reason: HOSPADM

## 2022-07-13 RX ORDER — LEVOTHYROXINE SODIUM 137 UG/1
137 TABLET ORAL DAILY
Status: DISCONTINUED | OUTPATIENT
Start: 2022-07-13 | End: 2022-07-14 | Stop reason: HOSPADM

## 2022-07-13 RX ORDER — ACETAMINOPHEN 325 MG/1
650 TABLET ORAL EVERY 6 HOURS PRN
Status: DISCONTINUED | OUTPATIENT
Start: 2022-07-13 | End: 2022-07-14 | Stop reason: HOSPADM

## 2022-07-13 RX ORDER — PROMETHAZINE HYDROCHLORIDE 12.5 MG/1
12.5 TABLET ORAL EVERY 6 HOURS PRN
Status: DISCONTINUED | OUTPATIENT
Start: 2022-07-13 | End: 2022-07-14 | Stop reason: HOSPADM

## 2022-07-13 RX ORDER — ONDANSETRON 2 MG/ML
4 INJECTION INTRAMUSCULAR; INTRAVENOUS EVERY 6 HOURS PRN
Status: DISCONTINUED | OUTPATIENT
Start: 2022-07-13 | End: 2022-07-13

## 2022-07-13 RX ORDER — DESMOPRESSIN ACETATE 0.2 MG/1
0.3 TABLET ORAL NIGHTLY
Status: ON HOLD | COMMUNITY
End: 2022-08-30 | Stop reason: HOSPADM

## 2022-07-13 RX ORDER — CETIRIZINE HYDROCHLORIDE 10 MG/1
10 TABLET ORAL DAILY
Status: DISCONTINUED | OUTPATIENT
Start: 2022-07-13 | End: 2022-07-14 | Stop reason: HOSPADM

## 2022-07-13 RX ORDER — HYDROCORTISONE 20 MG/1
10 TABLET ORAL
Status: ON HOLD | COMMUNITY
End: 2022-07-14 | Stop reason: HOSPADM

## 2022-07-13 RX ORDER — DESMOPRESSIN ACETATE 0.1 MG/1
200 TABLET ORAL 2 TIMES DAILY
Status: DISCONTINUED | OUTPATIENT
Start: 2022-07-13 | End: 2022-07-14 | Stop reason: HOSPADM

## 2022-07-13 RX ORDER — HYDROCORTISONE 20 MG/1
20 TABLET ORAL DAILY
Status: ON HOLD | COMMUNITY
End: 2022-07-14 | Stop reason: HOSPADM

## 2022-07-13 RX ORDER — PANTOPRAZOLE SODIUM 40 MG/1
40 TABLET, DELAYED RELEASE ORAL DAILY
Status: DISCONTINUED | OUTPATIENT
Start: 2022-07-13 | End: 2022-07-14 | Stop reason: HOSPADM

## 2022-07-13 RX ORDER — SODIUM CHLORIDE 0.9 % (FLUSH) 0.9 %
10 SYRINGE (ML) INJECTION EVERY 12 HOURS SCHEDULED
Status: DISCONTINUED | OUTPATIENT
Start: 2022-07-13 | End: 2022-07-14 | Stop reason: HOSPADM

## 2022-07-13 RX ORDER — LEVOTHYROXINE SODIUM 137 UG/1
137 TABLET ORAL DAILY
Status: ON HOLD | COMMUNITY
End: 2022-08-30 | Stop reason: HOSPADM

## 2022-07-13 RX ORDER — SODIUM CHLORIDE 0.9 % (FLUSH) 0.9 %
10 SYRINGE (ML) INJECTION PRN
Status: DISCONTINUED | OUTPATIENT
Start: 2022-07-13 | End: 2022-07-14 | Stop reason: HOSPADM

## 2022-07-13 RX ORDER — ENOXAPARIN SODIUM 100 MG/ML
40 INJECTION SUBCUTANEOUS DAILY
Status: DISCONTINUED | OUTPATIENT
Start: 2022-07-13 | End: 2022-07-14 | Stop reason: HOSPADM

## 2022-07-13 RX ORDER — TESTOSTERONE 20.25 MG/1.25G
2 GEL TOPICAL DAILY
COMMUNITY
End: 2022-09-06 | Stop reason: SDUPTHER

## 2022-07-13 RX ADMIN — SODIUM CHLORIDE, PRESERVATIVE FREE 10 ML: 5 INJECTION INTRAVENOUS at 22:09

## 2022-07-13 RX ADMIN — HYDROCORTISONE 20 MG: 20 TABLET ORAL at 09:26

## 2022-07-13 RX ADMIN — SODIUM CHLORIDE, PRESERVATIVE FREE 10 ML: 5 INJECTION INTRAVENOUS at 22:00

## 2022-07-13 RX ADMIN — DESMOPRESSIN ACETATE 200 MCG: 0.1 TABLET ORAL at 22:03

## 2022-07-13 RX ADMIN — HYDROCORTISONE 20 MG: 20 TABLET ORAL at 22:03

## 2022-07-13 RX ADMIN — LEVOTHYROXINE SODIUM 137 MCG: 0.14 TABLET ORAL at 09:26

## 2022-07-13 RX ADMIN — PANTOPRAZOLE SODIUM 40 MG: 40 TABLET, DELAYED RELEASE ORAL at 09:26

## 2022-07-13 RX ADMIN — SODIUM CHLORIDE, PRESERVATIVE FREE 10 ML: 5 INJECTION INTRAVENOUS at 09:31

## 2022-07-13 RX ADMIN — DESMOPRESSIN ACETATE 200 MCG: 0.1 TABLET ORAL at 09:26

## 2022-07-13 RX ADMIN — CETIRIZINE HYDROCHLORIDE 10 MG: 10 TABLET, FILM COATED ORAL at 09:26

## 2022-07-13 RX ADMIN — SODIUM CHLORIDE, PRESERVATIVE FREE 10 ML: 5 INJECTION INTRAVENOUS at 22:08

## 2022-07-13 RX ADMIN — ENOXAPARIN SODIUM 40 MG: 100 INJECTION SUBCUTANEOUS at 09:26

## 2022-07-13 ASSESSMENT — LIFESTYLE VARIABLES
HOW MANY STANDARD DRINKS CONTAINING ALCOHOL DO YOU HAVE ON A TYPICAL DAY: 1 OR 2
HOW OFTEN DO YOU HAVE A DRINK CONTAINING ALCOHOL: NEVER

## 2022-07-13 NOTE — CONSULTS
desmopressin (DDAVP) tablet 200 mcg, 200 mcg, Oral, BID  hydrocortisone (CORTEF) tablet 20 mg, 20 mg, Oral, BID  levothyroxine (SYNTHROID) tablet 137 mcg, 137 mcg, Oral, Daily  cetirizine (ZYRTEC) tablet 10 mg, 10 mg, Oral, Daily  pantoprazole (PROTONIX) tablet 40 mg, 40 mg, Oral, Daily  sodium chloride flush 0.9 % injection 10 mL, 10 mL, IntraVENous, 2 times per day  sodium chloride flush 0.9 % injection 10 mL, 10 mL, IntraVENous, PRN  0.9 % sodium chloride infusion, , IntraVENous, PRN  enoxaparin (LOVENOX) injection 40 mg, 40 mg, SubCUTAneous, Daily  promethazine (PHENERGAN) tablet 12.5 mg, 12.5 mg, Oral, Q6H PRN **OR** [DISCONTINUED] ondansetron (ZOFRAN) injection 4 mg, 4 mg, IntraVENous, Q6H PRN  polyethylene glycol (GLYCOLAX) packet 17 g, 17 g, Oral, Daily PRN  acetaminophen (TYLENOL) tablet 650 mg, 650 mg, Oral, Q6H PRN **OR** acetaminophen (TYLENOL) suppository 650 mg, 650 mg, Rectal, Q6H PRN  famotidine (PEPCID) tablet 20 mg, 20 mg, Oral, Once  naloxone (NARCAN) injection 0.4 mg, 0.4 mg, IntraVENous, Once  glucose chewable tablet 16 g, 4 tablet, Oral, PRN  dextrose bolus 10% 125 mL, 125 mL, IntraVENous, PRN **OR** dextrose bolus 10% 250 mL, 250 mL, IntraVENous, PRN  glucagon (rDNA) injection 1 mg, 1 mg, IntraMUSCular, PRN  dextrose 5 % solution, 100 mL/hr, IntraVENous, PRN  dextrose 50 % IV solution, 25 g, IntraVENous, PRN    Allergies:  Reglan [metoclopramide]    Social History:      TOBACCO:   reports that he has been smoking cigarettes and pipe. He has a 5.25 pack-year smoking history. He has never used smokeless tobacco.  ETOH:   reports current alcohol use.     Family History:     Family History   Problem Relation Age of Onset    Diabetes Mother     Kidney Disease Mother         on dialysis    Cancer Mother     Diabetes Sister     Diabetes Maternal Grandmother     Early Death Brother     Cancer Maternal Grandfather     Diabetes Maternal Uncle          Review of Systems:       Pertinent positives stated above in HPI. All other systems were reviewed and were negative.     Physical exam:   Constitutional:  VITALS:  BP 95/63   Pulse 74   Temp (!) 95.8 °F (35.4 °C)   Resp 16   Wt 206 lb (93.4 kg)   SpO2 97%   BMI 25.75 kg/m²   CURRENT TEMPERATURE:  Temp: (!) 95.8 °F (35.4 °C)  CURRENT RESPIRATORY RATE:  Resp: 16  CURRENT PULSE:  Heart Rate: 74  CURRENT BLOOD PRESSURE:  BP: 95/63  24HR BLOOD PRESSURE RANGE:  Systolic (80GVN), YN , Min:63 , HJZ:146   ; Diastolic (21YRJ), HJA:00, Min:44, Max:79    24HR INTAKE/OUTPUT:      Intake/Output Summary (Last 24 hours) at 2022 0833  Last data filed at 2022 2116  Gross per 24 hour   Intake --   Output 2900 ml   Net -2900 ml     Gen: alert, awake, nad  Skin: no rash, turgor wnl  Heent:  eomi, mmm  Neck: no bruits or jvd noted  Cardiovascular:  S1, S2 without m/r/g  Respiratory: Clear   Abdomen:  +bs, soft, nt, nd  Ext: No edema   Psychiatric: mood and affect appropriate  Musculoskeletal:  Rom, muscular strength intact    DATA:      CBC:   Lab Results   Component Value Date/Time    WBC 8.4 2022 06:48 PM    RBC 4.29 2022 06:48 PM    HGB 12.4 2022 06:48 PM    HCT 37.1 2022 06:48 PM    MCV 86.5 2022 06:48 PM    MCH 28.9 2022 06:48 PM    MCHC 33.4 2022 06:48 PM    RDW 15.2 2022 06:48 PM     2022 06:48 PM    MPV 9.6 2022 06:48 PM     BMP:    Lab Results   Component Value Date/Time     2022 06:34 AM    K 4.7 2022 06:34 AM    CL 99 2022 06:34 AM    CO2 17 2022 06:34 AM    BUN 10 2022 06:34 AM    LABALBU 4.7 2022 06:34 AM    LABALBU 4.2 2011 06:35 AM    CREATININE 1.5 2022 06:34 AM    CALCIUM 9.7 2022 06:34 AM    GFRAA 60 2022 06:34 AM    LABGLOM 60 2022 06:34 AM    GLUCOSE 148 2022 09:20 AM    GLUCOSE 74 2011 06:35 AM       RAD:  CT Head WO Contrast    Result Date: 2022  EXAMINATION: CT OF THE HEAD WITHOUT CONTRAST  7/13/2022 12:14 am TECHNIQUE: CT of the head was performed without the administration of intravenous contrast. Automated exposure control, iterative reconstruction, and/or weight based adjustment of the mA/kV was utilized to reduce the radiation dose to as low as reasonably achievable. COMPARISON: 04/18/2022 HISTORY: ORDERING SYSTEM PROVIDED HISTORY: altered TECHNOLOGIST PROVIDED HISTORY: Reason for exam:->altered Has a \"code stroke\" or \"stroke alert\" been called? ->No Decision Support Exception - unselect if not a suspected or confirmed emergency medical condition->Emergency Medical Condition (MA) What reading provider will be dictating this exam?->CRC FINDINGS: BRAIN/VENTRICLES: There is no acute intracranial hemorrhage, mass effect or midline shift. No abnormal extra-axial fluid collection. The gray-white differentiation is maintained without evidence of an acute infarct. There is no evidence of hydrocephalus. ORBITS: The visualized portion of the orbits demonstrate no acute abnormality. SINUSES: Right maxillary sinus mucosal thickening. SOFT TISSUES/SKULL:  No acute abnormality of the visualized skull or soft tissues. No acute intracranial abnormality. XR CHEST PORTABLE    Result Date: 7/12/2022  EXAMINATION: ONE XRAY VIEW OF THE CHEST 7/12/2022 7:10 pm COMPARISON: April 18, 2022 HISTORY: ORDERING SYSTEM PROVIDED HISTORY: altered TECHNOLOGIST PROVIDED HISTORY: Reason for exam:->altered What reading provider will be dictating this exam?->CRC FINDINGS: No airspace opacity or pleural effusion. The heart is normal size. No pneumothorax. No free air beneath the hemidiaphragms. No pneumonia or pleural effusion.          Assessment/Plan    1) SANDI  Likely prerenal in setting of mild dehydration   Baseline 1.0 -> 1.1  Admitting scr 1.6  UA and lytes noted above   Agree w/ IV fluids initiated in ED  Strict I/Os   Labs in the AM     2) Hyperkalemia  In setting of high dietary intake as well as unprescribed K+ supplements   Already showing improvement after medical treatment   Low K+ diet    3) Hyponatremia  Likely in setting of mild dehydration   Showing improvement w/ IVF    4) Essential HTN  Follow on current home medications     5) Pituitary Tumor  Previous surgery w/ Dr Duke Winslow   On Desmo and Hydrocortisone chronically          Thank you for allowing us to participate in care of Mr Gm Celeste, APRN - CNP  7/13/2022  8:33 AM     Patient seen and examined all key components of the physical performed independently , case discussed with NP, all pertinent labs and radiologic tests personally reviewed agree with above.     SANDI, mixed etiology, prerenal and obstrructive component    Gonzales Naranjo MD

## 2022-07-13 NOTE — ED NOTES
FSBS 56. Dr. Amadeo Hardy is aware. Verbal order for pt to eat food and drink apple juice per Dr. Amadeo Hardy.      Rosalia Pittman RN  07/12/22 2039

## 2022-07-13 NOTE — ED NOTES
[facility] [address] [facility], [address] [place] [address]  07/12/22 2107
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
## 2022-07-13 NOTE — ED PROVIDER NOTES
[address] [facility] ENCOUNTER      Pt Name: Kamran Smith  MRN: [id number]  [place] 1970  Date of evaluation: 7/12/2022      CHIEF COMPLAINT       Chief Complaint   Patient presents with    Allergic Reaction     pt arrives to ED w/ bilateral arm and leg cramping starting 1 hour ago. pt sts that he thinks hes having an allergic reaction. Pt sts he was eating fruit when symptoms started. pt also reports numbness in all 4 extremities with the cramping. No swelling reported. No tongue or throat swelling. HPI  Kamran Smith is a 46 y.o. male history of a pituitary tumor, asthma Presson and hydrocortisone at home presents for an allergic reaction. Patient received Solu-Medrol and Benadryl. Patient became diaphoretic and had difficulty speaking. Patient unable to give history or review of systems due to his mental status. History obtained from chart. Except as noted above the remainder of the review of systems was reviewed and negative. Review of Systems   Unable to perform ROS: Mental status change        Physical Exam  Vitals and nursing note reviewed. Constitutional:       General: He is in acute distress. Appearance: Normal appearance. He is ill-appearing and diaphoretic. HENT:      Head: Normocephalic and atraumatic. Right Ear: External ear normal.      Left Ear: External ear normal.      Nose: Nose normal.      Mouth/Throat:      Mouth: Mucous membranes are dry. Eyes:      Extraocular Movements: Extraocular movements intact. Pupils: Pupils are equal, round, and reactive to light. Comments: Pupils 3 mm equal bilateral.   Cardiovascular:      Rate and Rhythm: Regular rhythm. Bradycardia present. Pulses: Normal pulses. Heart sounds: Normal heart sounds. Pulmonary:      Effort: Pulmonary effort is normal.      Breath sounds: Normal breath sounds.    Abdominal:      General: Abdomen is flat. There is no distension. Palpations: Abdomen is soft. Tenderness: There is no right CVA tenderness, left CVA tenderness or rebound. Negative signs include Marcelo's sign, Rovsing's sign and McBurney's sign. Musculoskeletal:         General: No tenderness. Normal range of motion. Cervical back: Normal range of motion. Right lower leg: No edema. Left lower leg: No edema. Skin:     General: Skin is warm. Capillary Refill: Capillary refill takes less than 2 seconds. Neurological:      General: No focal deficit present. Mental Status: He is alert and oriented to person, place, and time. Psychiatric:         Mood and Affect: Mood normal.          Procedures     MDM  Number of Diagnoses or Management Options  SANDI (acute kidney injury) ([facility] [address]  History of pituitary tumor  Hyperkalemia  Urinary retention  Diagnosis management comments: 49-year-old male history of pituitary tumor on desmopressin and hydrocortisone at home initially presented for an allergic reaction. Patient was in fast-track area and given Benadryl and Solu-Medrol. He was diaphoretic, lethargic, unable to participate in history or ros. Point-of-care glucose 93. Patient was placed in room 4. EKG had wide QRS and prolonged QTC. Calcium and Narcan given. Patient is much more awake and alert. Diagnostic labs and imaging interpreted reviewed. Patient's potassium on i-STAT was 6.1. Hyperkalemia protocol ordered. ABG shows respiratory alkalosis with potassium of 7.3. Consult to nephrology was made. To give patient 30 of Kayexalate with hyper-K protocol. Flores was placed. He has severe urinary retention. 1.5 L of urine has been collected. Repeat evaluation the patient after hyper-K protocol given. Patient is much more awake and alert. States been taking Burdrock Root as a supplement for an unknown reason.   Patient has been admitted to the hospital.        Amount and/or Complexity of Data Reviewed  Decide Urine Culture, Routine Growth not present, incubation continues    CBC with Auto Differential   Result Value Ref Range    WBC 8.4 4.5 - 11.5 E9/L    RBC 4.29 3.80 - 5.80 E12/L    Hemoglobin 12.4 (L) 12.5 - 16.5 g/dL    Hematocrit 37.1 37.0 - 54.0 %    MCV 86.5 80.0 - 99.9 fL    MCH 28.9 26.0 - 35.0 pg    MCHC 33.4 32.0 - 34.5 %    RDW 15.2 (H) 11.5 - 15.0 fL    Platelets 270 262 - 504 E9/L    MPV 9.6 7.0 - 12.0 fL    Neutrophils % 36.0 (L) 43.0 - 80.0 %    Immature Granulocytes % 0.2 0.0 - 5.0 %    Lymphocytes % 57.9 (H) 20.0 - 42.0 %    Monocytes % 3.6 2.0 - 12.0 %    Eosinophils % 1.8 0.0 - 6.0 %    Basophils % 0.5 0.0 - 2.0 %    Neutrophils Absolute 3.04 1.80 - 7.30 E9/L    Immature Granulocytes # 0.02 E9/L    Lymphocytes Absolute 4.88 (H) 1.50 - 4.00 E9/L    Monocytes Absolute 0.30 0.10 - 0.95 E9/L    Eosinophils Absolute 0.15 0.05 - 0.50 E9/L    Basophils Absolute 0.04 0.00 - 0.20 E9/L   Comprehensive Metabolic Panel   Result Value Ref Range    Sodium 130 (L) 132 - 146 mmol/L    Potassium 7.8 (HH) 3.5 - 5.0 mmol/L    Chloride 99 98 - 107 mmol/L    CO2 20 (L) 22 - 29 mmol/L    Anion Gap 11 7 - 16 mmol/L    Glucose 94 74 - 99 mg/dL    BUN 6 6 - 20 mg/dL    CREATININE 1.6 (H) 0.7 - 1.2 mg/dL    GFR Non-African American 55 >=60 mL/min/1.73    GFR African American 55     Calcium 10.0 8.6 - 10.2 mg/dL    Total Protein 8.4 (H) 6.4 - 8.3 g/dL    Albumin 4.9 3.5 - 5.2 g/dL    Total Bilirubin 0.4 0.0 - 1.2 mg/dL    Alkaline Phosphatase 43 40 - 129 U/L    ALT 8 0 - 40 U/L    AST 18 0 - 39 U/L   Magnesium   Result Value Ref Range    Magnesium 2.5 1.6 - 2.6 mg/dL   Troponin   Result Value Ref Range    Troponin, High Sensitivity 9 0 - 11 ng/L   Brain Natriuretic Peptide   Result Value Ref Range    Pro-BNP 55 0 - 125 pg/mL   Protime-INR   Result Value Ref Range    Protime 12.0 9.3 - 12.4 sec    INR 1.1    APTT   Result Value Ref Range    aPTT 44.1 (H) 24.5 - 35.1 sec   Urinalysis with Microscopic   Result Value Ref Range Color, UA Yellow Straw/Yellow    Clarity, UA Clear Clear    Glucose, Ur Negative Negative mg/dL    Bilirubin Urine Negative Negative    Ketones, Urine Negative Negative mg/dL    Specific Gravity, UA 1.010 1.005 - 1.030    Blood, Urine SMALL (A) Negative    pH, UA 8.5 5.0 - 9.0    Protein, UA Negative Negative mg/dL    Urobilinogen, Urine 0.2 <2.0 E.U./dL    Nitrite, Urine Negative Negative    Leukocyte Esterase, Urine Negative Negative    WBC, UA 0-1 0 - 5 /HPF    RBC, UA 1-3 0 - 2 /HPF    Bacteria, UA NONE SEEN None Seen /HPF   Serum Drug Screen   Result Value Ref Range    Ethanol Lvl <10 mg/dL    Acetaminophen Level <5.0 (L) 10.0 - 08.7 mcg/mL    Salicylate, Serum <8.9 0.0 - 30.0 mg/dL    TCA Scrn NEGATIVE Cutoff:300 ng/mL   URINE DRUG SCREEN   Result Value Ref Range    Amphetamine Screen, Urine NOT DETECTED Negative <1000 ng/mL    Barbiturate Screen, Ur NOT DETECTED Negative < 200 ng/mL    Benzodiazepine Screen, Urine NOT DETECTED Negative < 200 ng/mL    Cannabinoid Scrn, Ur POSITIVE (A) Negative < 50ng/mL    Cocaine Metabolite Screen, Urine NOT DETECTED Negative < 300 ng/mL    Opiate Scrn, Ur NOT DETECTED Negative < 300ng/mL    PCP Screen, Urine NOT DETECTED Negative < 25 ng/mL    Methadone Screen, Urine NOT DETECTED Negative <300 ng/mL    Oxycodone Urine NOT DETECTED Negative <100 ng/mL    FENTANYL SCREEN, URINE NOT DETECTED Negative <1 ng/mL    Drug Screen Comment: see below    Lactic Acid   Result Value Ref Range    Lactic Acid 1.9 0.5 - 2.2 mmol/L   Calcium, Ionized   Result Value Ref Range    Calcium, Ion 1.32 1.15 - 1.33 mmol/L   TSH   Result Value Ref Range    TSH 0.046 (L) 0.270 - 4.200 uIU/mL   CK   Result Value Ref Range    Total  (H) 20 - 200 U/L   Osmolality, Serum   Result Value Ref Range    Osmolality 283 (L) 285 - 310 mOsm/Kg   OSMOLALITY, URINE   Result Value Ref Range    Osmolality, Ur 193 (L) 300 - 900 mOsm/kg   URINE ELECTROLYTES   Result Value Ref Range    Sodium, Ur 68 Not Established mmol/L    Potassium, Ur 33.5 Not Established mmol/L    Chloride 47 Not Established mmol/L   MICROALBUMIN / CREATININE URINE RATIO   Result Value Ref Range    Microalbumin, Random Urine 34.1 (H) Not Established mg/L    Creatinine, Ur 20 (L) 40 - 278 mg/dL    Microalbumin Creatinine Ratio 170.5 (H) 0.0 - 30.0   Blood Gas, Arterial   Result Value Ref Range    Date Analyzed [id number]     Time Analyzed 1859     Source: Blood Arterial     pH, Blood Gas 7.536 (H) 7.350 - 7.450    PCO2 22.1 (L) 35.0 - 45.0 mmHg    PO2 111.7 (H) 75.0 - 100.0 mmHg    HCO3 18.3 (L) 22.0 - 26.0 mmol/L    B.E. -2.2 -3.0 - 3.0 mmol/L    O2 Sat 98.2 92.0 - 98.5 %    O2Hb 93.4 (L) 94.0 - 97.0 %    COHb 4.6 (H) 0.0 - 1.5 %    MetHb 0.3 0.0 - 1.5 %    O2 Content 18.3 mL/dL    HHb 1.7 0.0 - 5.0 %    tHb (est) 13.8 11.5 - 16.5 g/dL    Potassium 7.53 (HH) 3.50 - 5.00 mmol/L    Mode RA     Date Of Collection      Time Collected      Pt Temp 37.0 C     ID [id number]     Lab [id number]     Critical(s) Notified Handed report to Dr/RN    Potassium   Result Value Ref Range    Potassium 4.2 3.5 - 5.0 mmol/L   Comprehensive Metabolic Panel w/ Reflex to MG   Result Value Ref Range    Sodium 133 132 - 146 mmol/L    Potassium reflex Magnesium 4.7 3.5 - 5.0 mmol/L    Chloride 99 98 - 107 mmol/L    CO2 17 (L) 22 - 29 mmol/L    Anion Gap 17 (H) 7 - 16 mmol/L    Glucose 143 (H) 74 - 99 mg/dL    BUN 10 6 - 20 mg/dL    CREATININE 1.5 (H) 0.7 - 1.2 mg/dL    GFR Non-African American 60 >=60 mL/min/1.73    GFR African American 60     Calcium 9.7 8.6 - 10.2 mg/dL    Total Protein 8.1 6.4 - 8.3 g/dL    Albumin 4.7 3.5 - 5.2 g/dL    Total Bilirubin 0.4 0.0 - 1.2 mg/dL    Alkaline Phosphatase 44 40 - 129 U/L    ALT 9 0 - 40 U/L    AST 19 0 - 39 U/L   POCT Glucose   Result Value Ref Range    Meter Glucose 92 74 - 99 mg/dL   POCT Glucose   Result Value Ref Range    Glucose 48 mg/dL    QC OK? y    POCT Glucose   Result Value Ref Range    Glucose 148 mg/dL    QC OK? ok    POCT Venous   Result Value Ref Range    POC Sodium 137 132 - 146 mmol/L    POC Potassium 6.4 (H) 3.5 - 5.0 mmol/L    POC Chloride 106 100 - 108 mmol/L    POC Glucose 74 74 - 99 mg/dl    POC Creatinine 1.2 0.7 - 1.2 mg/dL    GFR Non-African American >60 >=60 mL/min/1.73    GFR  >60     Performed on SEE BELOW    POCT Glucose   Result Value Ref Range    Meter Glucose 191 (H) 74 - 99 mg/dL   POCT Glucose   Result Value Ref Range    Meter Glucose 56 (L) 74 - 99 mg/dL   POCT Glucose   Result Value Ref Range    Meter Glucose 48 (L) 74 - 99 mg/dL   POCT Venous   Result Value Ref Range    POC Sodium 146 132 - 146 mmol/L    POC Potassium 3.4 (L) 3.5 - 5.0 mmol/L    POC Chloride 117 (H) 100 - 108 mmol/L    POC Glucose 35 (LL) 74 - 99 mg/dl    POC Creatinine 0.9 0.7 - 1.2 mg/dL    GFR Non-African American >60 >=60 mL/min/1.73    GFR  >60     Performed on SEE BELOW    POCT Glucose   Result Value Ref Range    Meter Glucose 381 (H) 74 - 99 mg/dL   POCT Glucose   Result Value Ref Range    Meter Glucose 123 (H) 74 - 99 mg/dL   POCT Glucose   Result Value Ref Range    Meter Glucose 148 (H) 74 - 99 mg/dL   EKG 12 Lead   Result Value Ref Range    Ventricular Rate 58 BPM    Atrial Rate 58 BPM    P-R Interval 242 ms    QRS Duration 120 ms    Q-T Interval 526 ms    QTc Calculation (Bazett) 516 ms    P Axis 53 degrees    R Axis -81 degrees    T Axis 67 degrees   TYPE AND SCREEN   Result Value Ref Range    ABO/Rh O POS     Antibody Screen NEG        RADIOLOGY:  CT Head WO Contrast   Final Result   No acute intracranial abnormality. XR CHEST PORTABLE   Final Result   No pneumonia or pleural effusion. EKG:  EKG read interpreted by me. Heart rate 60. Sinus rhythm. Wide QRS. Prolonged QTC. No ST elevations or depressions.   Change compared to previous EKG.        ------------------------- NURSING NOTES AND VITALS REVIEWED ---------------------------  Date / Time Roomed:  7/12/2022  6:09 PM  ED Bed Assignment:  07/07    The nursing notes within the ED encounter and vital signs as below have been reviewed. Patient Vitals for the past 24 hrs:   BP Temp Temp src Pulse Resp SpO2 Weight   07/13/22 1030 99/69 97.6 °F (36.4 °C) Oral 65 16 99 % --   07/13/22 0627 -- -- -- -- -- -- 206 lb (93.4 kg)   07/12/22 2314 95/63 -- -- 74 16 97 % --   07/12/22 2300 (!) 63/47 -- -- 76 16 100 % --   07/12/22 2137 -- (!) 95.8 °F (35.4 °C) -- -- -- -- --   07/12/22 2125 (!) 96/44 -- -- 53 -- -- --   07/12/22 2031 109/79 -- -- 56 18 97 % --   07/12/22 2028 -- 97.5 °F (36.4 °C) Oral -- -- -- --   07/12/22 1819 110/62 -- -- 70 -- -- --   07/12/22 1810 -- -- -- -- 18 98 % --       Oxygen Saturation Interpretation: Normal    ------------------------------------------ PROGRESS NOTES ------------------------------------------    Counseling:  I have spoken with the patient and discussed todays results, in addition to providing specific details for the plan of care and counseling regarding the diagnosis and prognosis. Their questions are answered at this time and they are agreeable with the plan of admission.    --------------------------------- ADDITIONAL PROVIDER NOTES ---------------------------------  Consultations:  Spoke with Dr. Nayeli Lea. Discussed case. They will admit the patient. Spoke with Dr. Rachid Plata nephrology consulted on patient    This patient's ED course included: a personal history and physicial examination, re-evaluation prior to disposition, multiple bedside re-evaluations, IV medications, cardiac monitoring and continuous pulse oximetry    Please note that the withdrawal or failure to initiate urgent interventions for this patient would likely result in a life threatening deterioration or permanent disability. Accordingly this patient received 40 minutes of critical care time, excluding separately billable procedures.       This patient has remained hemodynamically stable during their ED course. Diagnosis:  1. Hyperkalemia    2. SANDI (acute kidney injury) ([facility] [address]    3. Urinary retention    4. History of pituitary tumor        Disposition:  Patient's disposition: Admit to telemetry  Patient's condition is fair.           Merline Alonzo MD  Resident  07/12/22 1810       Merline Alonzo MD  Resident  07/13/22 1794

## 2022-07-13 NOTE — ED NOTES
Pt yousif emptied. 1800 urine output. Dr. Clinton Young is aware.       Emily Camp RN  07/12/22 2017

## 2022-07-13 NOTE — PROGRESS NOTES
Hospitalist Progress Note      PCP: Afshin Miller DO    Date of Admission: 7/12/2022    Chief Complaint: Possible allergic reaction    Hospital Course: Patient presents to the ED emergency room because of bilateral arm and leg cramping after eating melon. He thought he was having allergic reaction. There was no tongue or throat swelling noted. Patient was given Solu-Medrol and Benadryl. Patient is currently at baseline. In the emergency room patient was noted to have hyperkalemia and this was treated. Patient also had hypoglycemia. This was also treated. His creatinine was elevated. Patient had normal renal functions previously. He was then admitted for further evaluation treatment. Subjective: Patient is feeling significantly improved  He is putting out urine  Denies any new issues      Medications:  Reviewed    Infusion Medications    sodium chloride      dextrose       Scheduled Medications    desmopressin  200 mcg Oral BID    hydrocortisone  20 mg Oral BID    levothyroxine  137 mcg Oral Daily    cetirizine  10 mg Oral Daily    pantoprazole  40 mg Oral Daily    sodium chloride flush  10 mL IntraVENous 2 times per day    enoxaparin  40 mg SubCUTAneous Daily    famotidine  20 mg Oral Once    naloxone  0.4 mg IntraVENous Once     PRN Meds: sodium chloride flush, sodium chloride, promethazine **OR** [DISCONTINUED] ondansetron, polyethylene glycol, acetaminophen **OR** acetaminophen, glucose, dextrose bolus **OR** dextrose bolus, glucagon (rDNA), dextrose, dextrose      Intake/Output Summary (Last 24 hours) at 7/13/2022 1124  Last data filed at 7/12/2022 2116  Gross per 24 hour   Intake --   Output 2900 ml   Net -2900 ml       Exam:    BP 99/69   Pulse 65   Temp 97.6 °F (36.4 °C) (Oral)   Resp 16   Wt 206 lb (93.4 kg)   SpO2 99%   BMI 25.75 kg/m²     General appearance: No apparent distress, appears stated age and cooperative.   HEENT: Pupils equal, round, and reactive to light. Conjunctivae/corneas clear. Neck: Supple, with full range of motion. No jugular venous distention. Trachea midline. Respiratory:  Normal respiratory effort. Clear to auscultation, bilaterally without Rales/Wheezes/Rhonchi. Cardiovascular: Regular rate and rhythm with normal S1/S2 without murmurs, rubs or gallops. Abdomen: Soft, non-tender, non-distended with normal bowel sounds. Musculoskeletal: No clubbing, cyanosis or edema bilaterally. Full range of motion without deformity. Skin: Skin color, texture, turgor normal.  No rashes or lesions. Neurologic:  Neurovascularly intact without any focal sensory/motor deficits. Cranial nerves: II-XII intact, grossly non-focal.  Psychiatric: Alert and oriented, thought content appropriate, normal insight    Labs:   Recent Labs     07/12/22 1848   WBC 8.4   HGB 12.4*   HCT 37.1        Recent Labs     07/12/22 1848 07/12/22 1848 07/12/22  1859 07/12/22  2105 07/12/22 2112 07/13/22  0634   *  --   --   --   --  133   K 7.8*   < > 7.53* 4.2  --  4.7   CL 99  --   --   --   --  99   CO2 20*  --   --   --   --  17*   BUN 6  --   --   --   --  10   CREATININE 1.6*  --   --   --  0.9 1.5*   CALCIUM 10.0  --   --   --   --  9.7    < > = values in this interval not displayed. Recent Labs     07/12/22 1848 07/13/22  0634   AST 18 19   ALT 8 9   BILITOT 0.4 0.4   ALKPHOS 43 44     Recent Labs     07/12/22 1848   INR 1.1     Recent Labs     07/12/22 1848   CKTOTAL 65*       Assessment/Plan:    Active Hospital Problems    Diagnosis Date Noted    Hyperkalemia [E87.5] 07/12/2022     Priority: Medium   Acute kidney injury  Hypothyroidism  History of pituitary tumor    Patient is on IV hydration  Renal function slightly better  Clinically improved  Nephrology following  Discharge once kidney function improved      DVT Prophylaxis: Lovenox  Diet: ADULT DIET;  Regular  Code Status: Full Code        Dispo -remains inpatient await improvement in renal function    Paco Ayala MD

## 2022-07-14 VITALS
SYSTOLIC BLOOD PRESSURE: 100 MMHG | RESPIRATION RATE: 17 BRPM | TEMPERATURE: 98.4 F | WEIGHT: 204 LBS | HEART RATE: 47 BPM | OXYGEN SATURATION: 97 % | DIASTOLIC BLOOD PRESSURE: 67 MMHG | HEIGHT: 75 IN | BODY MASS INDEX: 25.36 KG/M2

## 2022-07-14 LAB
ALBUMIN SERPL-MCNC: 4.7 G/DL (ref 3.5–5.2)
ALP BLD-CCNC: 41 U/L (ref 40–129)
ALT SERPL-CCNC: 8 U/L (ref 0–40)
ANION GAP SERPL CALCULATED.3IONS-SCNC: 16 MMOL/L (ref 7–16)
AST SERPL-CCNC: 17 U/L (ref 0–39)
BASOPHILS ABSOLUTE: 0.03 E9/L (ref 0–0.2)
BASOPHILS RELATIVE PERCENT: 0.2 % (ref 0–2)
BILIRUB SERPL-MCNC: 0.4 MG/DL (ref 0–1.2)
BUN BLDV-MCNC: 14 MG/DL (ref 6–20)
CALCIUM SERPL-MCNC: 9.4 MG/DL (ref 8.6–10.2)
CHLORIDE BLD-SCNC: 100 MMOL/L (ref 98–107)
CO2: 20 MMOL/L (ref 22–29)
CREAT SERPL-MCNC: 1.5 MG/DL (ref 0.7–1.2)
EOSINOPHILS ABSOLUTE: 0 E9/L (ref 0.05–0.5)
EOSINOPHILS RELATIVE PERCENT: 0 % (ref 0–6)
GFR AFRICAN AMERICAN: 60
GFR NON-AFRICAN AMERICAN: 60 ML/MIN/1.73
GLUCOSE BLD-MCNC: 123 MG/DL (ref 74–99)
HCT VFR BLD CALC: 35.1 % (ref 37–54)
HEMOGLOBIN: 11.8 G/DL (ref 12.5–16.5)
IMMATURE GRANULOCYTES #: 0.16 E9/L
IMMATURE GRANULOCYTES %: 0.8 % (ref 0–5)
LYMPHOCYTES ABSOLUTE: 2.77 E9/L (ref 1.5–4)
LYMPHOCYTES RELATIVE PERCENT: 13.9 % (ref 20–42)
MAGNESIUM: 2.2 MG/DL (ref 1.6–2.6)
MCH RBC QN AUTO: 29.4 PG (ref 26–35)
MCHC RBC AUTO-ENTMCNC: 33.6 % (ref 32–34.5)
MCV RBC AUTO: 87.5 FL (ref 80–99.9)
MONOCYTES ABSOLUTE: 0.93 E9/L (ref 0.1–0.95)
MONOCYTES RELATIVE PERCENT: 4.7 % (ref 2–12)
NEUTROPHILS ABSOLUTE: 16.01 E9/L (ref 1.8–7.3)
NEUTROPHILS RELATIVE PERCENT: 80.4 % (ref 43–80)
PDW BLD-RTO: 15.2 FL (ref 11.5–15)
PLATELET # BLD: 246 E9/L (ref 130–450)
PMV BLD AUTO: 9.9 FL (ref 7–12)
POTASSIUM REFLEX MAGNESIUM: 3.4 MMOL/L (ref 3.5–5)
RBC # BLD: 4.01 E12/L (ref 3.8–5.8)
SODIUM BLD-SCNC: 136 MMOL/L (ref 132–146)
TOTAL PROTEIN: 7.9 G/DL (ref 6.4–8.3)
URINE CULTURE, ROUTINE: NORMAL
WBC # BLD: 19.9 E9/L (ref 4.5–11.5)

## 2022-07-14 PROCEDURE — 80053 COMPREHEN METABOLIC PANEL: CPT

## 2022-07-14 PROCEDURE — 36415 COLL VENOUS BLD VENIPUNCTURE: CPT

## 2022-07-14 PROCEDURE — 2580000003 HC RX 258: Performed by: FAMILY MEDICINE

## 2022-07-14 PROCEDURE — 85025 COMPLETE CBC W/AUTO DIFF WBC: CPT

## 2022-07-14 PROCEDURE — 6370000000 HC RX 637 (ALT 250 FOR IP): Performed by: FAMILY MEDICINE

## 2022-07-14 PROCEDURE — 6360000002 HC RX W HCPCS: Performed by: FAMILY MEDICINE

## 2022-07-14 PROCEDURE — 83735 ASSAY OF MAGNESIUM: CPT

## 2022-07-14 RX ORDER — HYDROCORTISONE 20 MG/1
20 TABLET ORAL 2 TIMES DAILY
Qty: 60 TABLET | Refills: 0 | Status: ON HOLD | OUTPATIENT
Start: 2022-07-14 | End: 2022-08-30 | Stop reason: HOSPADM

## 2022-07-14 RX ADMIN — LEVOTHYROXINE SODIUM 137 MCG: 0.14 TABLET ORAL at 09:29

## 2022-07-14 RX ADMIN — SODIUM CHLORIDE, PRESERVATIVE FREE 10 ML: 5 INJECTION INTRAVENOUS at 09:30

## 2022-07-14 RX ADMIN — HYDROCORTISONE 20 MG: 20 TABLET ORAL at 09:29

## 2022-07-14 RX ADMIN — CETIRIZINE HYDROCHLORIDE 10 MG: 10 TABLET, FILM COATED ORAL at 09:29

## 2022-07-14 RX ADMIN — ENOXAPARIN SODIUM 40 MG: 100 INJECTION SUBCUTANEOUS at 09:35

## 2022-07-14 RX ADMIN — DESMOPRESSIN ACETATE 200 MCG: 0.1 TABLET ORAL at 09:29

## 2022-07-14 RX ADMIN — PANTOPRAZOLE SODIUM 40 MG: 40 TABLET, DELAYED RELEASE ORAL at 09:29

## 2022-07-14 NOTE — CARE COORDINATION
Patient presented to the ED with bilateral arm and leg cramping, reported numbness to all extremities and had difficulty speaking; admitted for hyperkalemia, urinary retention and SANDI. Met with patient at bedside for transition of care planning; alert and oriented X4. Patient up in the room and getting ready to leave; no discharge order in. Patient reports he is ready to go, states the doctor just came in and told him he is being discharge. He reports he is independent, uses [address] [facility]) and PCP is Dr. Kiana Patel. Plan is home, patient reports no needs and has transport. Nurse informed.     Alvaro Jain, MSW, LSW [phone]

## 2022-07-14 NOTE — PROGRESS NOTES
NEPHROLOGY Attending   Progress Note  7/14/2022 8:47 AM  Subjective:   Admit Date: 7/12/2022  PCP: Crista Evans DO    Interval History:    07/14/2022: No acute events over night - in no acute distress - feels back to normal -  eating and drinking without issue. Diet: ADULT DIET; Regular    Data:   Scheduled Meds:   desmopressin  200 mcg Oral BID    hydrocortisone  20 mg Oral BID    levothyroxine  137 mcg Oral Daily    cetirizine  10 mg Oral Daily    pantoprazole  40 mg Oral Daily    sodium chloride flush  10 mL IntraVENous 2 times per day    enoxaparin  40 mg SubCUTAneous Daily    famotidine  20 mg Oral Once    naloxone  0.4 mg IntraVENous Once     Continuous Infusions:   sodium chloride      dextrose       PRN Meds:sodium chloride flush, sodium chloride, promethazine **OR** [DISCONTINUED] ondansetron, polyethylene glycol, acetaminophen **OR** acetaminophen, glucose, dextrose bolus **OR** dextrose bolus, glucagon (rDNA), dextrose, dextrose    Intake/Output Summary (Last 24 hours) at 7/14/2022 0847  Last data filed at 7/14/2022 0600  Gross per 24 hour   Intake --   Output 425 ml   Net -425 ml     CBC:   Recent Labs     07/12/22 1848 07/14/22  0546   WBC 8.4 19.9*   HGB 12.4* 11.8*    246     BMP:    Recent Labs     07/12/22  1845 07/12/22  1848 07/12/22  1859 07/12/22 2102 07/12/22  2105 07/12/22  2112 07/13/22  0634 07/13/22  0920 07/14/22  0546   NA  --  130*  --   --   --   --  133  --  136   K  --  7.8*   < >  --  4.2  --  4.7  --  3.4*   CL  --  99  --   --   --   --  99  --  100   CO2  --  20*  --   --   --   --  17*  --  20*   BUN  --  6  --   --   --   --  10  --  14   CREATININE   < > 1.6*  --   --   --  0.9 1.5*  --  1.5*   GLUCOSE  --  94   < >   < >  --   --  143* 148 123*    < > = values in this interval not displayed.      Hepatic:   Recent Labs     07/12/22  1848 07/13/22  0634 07/14/22  0546   AST 18 19 17   ALT 8 9 8   BILITOT 0.4 0.4 0.4   ALKPHOS 43 44 41 bowel sounds normal; no masses,  no organomegaly  Extremities: extremities normal, atraumatic, no cyanosis or edema  Neurologic: Mental status: Alert, oriented, thought content appropriate      Assessment/Plan:     1) SANDI  Likely prerenal in setting of mild dehydration   Baseline 1.0 -> 1.1  Admitting scr 1.6; Cr 1.5 today  UA (bland) and lytes noted ([place]ard 68 and UK 33.5)   IVF initiated in ED with good response   Strict I/Os   Follow labs      2) Hyperkalemia  In setting of high dietary intake as well as unprescribed K+ supplements   S/P medical treatment in ED  After peaking at 7.8 -> K 4.7 today  Continue Low K+ diet     3) Hyponatremia  Likely in setting of mild dehydration   Na 133 today  Follow off IVF  Follow labs     4) Essential HTN  BP below goal < 130/80  Follow on current home medications   Monitor vitals     5) Pituitary Tumor  Previous surgery w/ Dr Ku Jurist   On Desmo and Hydrocortisone chronically          Okay for discharge from Renal standpoint - will need BMP on Monday      ANGELITO Jones - CNP     Patient doing well feels well K better   Remove yousif if voids well ok to dscharge

## 2022-07-14 NOTE — PROGRESS NOTES
Hospitalist Progress Note      PCP: Divya Pardo DO    Date of Admission: 7/12/2022    Chief Complaint: Possible allergic reaction    Hospital Course: Patient presents to the ED emergency room because of bilateral arm and leg cramping after eating melon. He thought he was having allergic reaction. There was no tongue or throat swelling noted. Patient was given Solu-Medrol and Benadryl. Patient is currently at baseline. In the emergency room patient was noted to have hyperkalemia and this was treated. Patient also had hypoglycemia. This was also treated. His creatinine was elevated. Patient had normal renal functions previously. He was then admitted for further evaluation treatment. Subjective: no new issues    Medications:  Reviewed    Infusion Medications    sodium chloride      dextrose       Scheduled Medications    desmopressin  200 mcg Oral BID    hydrocortisone  20 mg Oral BID    levothyroxine  137 mcg Oral Daily    cetirizine  10 mg Oral Daily    pantoprazole  40 mg Oral Daily    sodium chloride flush  10 mL IntraVENous 2 times per day    enoxaparin  40 mg SubCUTAneous Daily    famotidine  20 mg Oral Once    naloxone  0.4 mg IntraVENous Once     PRN Meds: sodium chloride flush, sodium chloride, promethazine **OR** [DISCONTINUED] ondansetron, polyethylene glycol, acetaminophen **OR** acetaminophen, glucose, dextrose bolus **OR** dextrose bolus, glucagon (rDNA), dextrose, dextrose      Intake/Output Summary (Last 24 hours) at 7/14/2022 1256  Last data filed at 7/14/2022 0600  Gross per 24 hour   Intake --   Output 425 ml   Net -425 ml       Exam:    /67   Pulse (!) 47   Temp 98.4 °F (36.9 °C)   Resp 17   Ht 6' 3\" (1.905 m)   Wt 204 lb (92.5 kg)   SpO2 97%   BMI 25.50 kg/m²     General appearance: No apparent distress, appears stated age and cooperative. HEENT: Pupils equal, round, and reactive to light. Conjunctivae/corneas clear.   Neck: Supple, with full range of motion. No jugular venous distention. Trachea midline. Respiratory:  Normal respiratory effort. Clear to auscultation, bilaterally without Rales/Wheezes/Rhonchi. Cardiovascular: Regular rate and rhythm with normal S1/S2 without murmurs, rubs or gallops. Abdomen: Soft, non-tender, non-distended with normal bowel sounds. Musculoskeletal: No clubbing, cyanosis or edema bilaterally. Full range of motion without deformity. Skin: Skin color, texture, turgor normal.  No rashes or lesions. Neurologic:  Neurovascularly intact without any focal sensory/motor deficits. Cranial nerves: II-XII intact, grossly non-focal.  Psychiatric: Alert and oriented, thought content appropriate, normal insight    Labs:   Recent Labs     07/12/22 1848 07/14/22  0546   WBC 8.4 19.9*   HGB 12.4* 11.8*   HCT 37.1 35.1*    246     Recent Labs     07/12/22 1845 07/12/22 1848 07/12/22  1859 07/12/22 2105 07/12/22 2112 07/13/22  0634 07/14/22  0546   NA  --  130*  --   --   --  133 136   K  --  7.8*   < > 4.2  --  4.7 3.4*   CL  --  99  --   --   --  99 100   CO2  --  20*  --   --   --  17* 20*   BUN  --  6  --   --   --  10 14   CREATININE   < > 1.6*  --   --  0.9 1.5* 1.5*   CALCIUM  --  10.0  --   --   --  9.7 9.4    < > = values in this interval not displayed. Recent Labs     07/12/22 1848 07/13/22  0634 07/14/22  0546   AST 18 19 17   ALT 8 9 8   BILITOT 0.4 0.4 0.4   ALKPHOS 43 44 41     Recent Labs     07/12/22 1848   INR 1.1     Recent Labs     07/12/22 1848   CKTOTAL 65*       Assessment/Plan:    Active Hospital Problems    Diagnosis Date Noted    Hyperkalemia [E87.5] 07/12/2022     Priority: Medium   Acute kidney injury  Hypothyroidism  History of pituitary tumor    Improved  creatinine is still 1.5  Dc home if ok with nephrology      DVT Prophylaxis: Lovenox  Diet: ADULT DIET;  Regular  Code Status: Full Code        Dispo -dc home  Kadie Soto MD

## 2022-07-14 NOTE — PROGRESS NOTES
Pt discharged but left prior to RN giving discharge paperwork to pt. All IV's and yousif was removed prior to patient leaving.

## 2022-07-15 ENCOUNTER — TELEPHONE (OUTPATIENT)
Dept: FAMILY MEDICINE CLINIC | Age: 52
End: 2022-07-15

## 2022-07-15 NOTE — TELEPHONE ENCOUNTER
[facility] Transitions Initial Follow Up Call    Outreach made within 2 business days of discharge: Yes    Patient: Olivier Powers Patient : 1970   MRN: [id number]  Reason for Admission: Hyperkalemia  Discharge Date: 22       Spoke with: Chava Link    Discharge department/facility: [facility] Interactive Patient Contact:  Was patient able to fill all prescriptions: No new prescription, only change how to take Hydrocortisone  Was patient instructed to bring all medications to the follow-up visit: Yes  Is patient taking all medications as directed in the discharge summary?  Yes  Does patient understand their discharge instructions: Yes  Does patient have questions or concerns that need addressed prior to 7-14 day follow up office visit: no    Scheduled appointment with PCP within 7-14 days    Follow Up  Future Appointments   Date Time Provider Hailee Mares   2022  9:00 AM [place], [place]

## 2022-08-24 ENCOUNTER — HOSPITAL ENCOUNTER (INPATIENT)
Age: 52
LOS: 5 days | Discharge: HOME | DRG: 270 | End: 2022-08-30
Attending: STUDENT IN AN ORGANIZED HEALTH CARE EDUCATION/TRAINING PROGRAM | Admitting: FAMILY MEDICINE
Payer: MEDICARE

## 2022-08-24 ENCOUNTER — APPOINTMENT (OUTPATIENT)
Dept: CT IMAGING | Age: 52
DRG: 270 | End: 2022-08-24
Payer: MEDICARE

## 2022-08-24 DIAGNOSIS — I31.39 PERICARDIAL EFFUSION WITH CARDIAC TAMPONADE: Primary | ICD-10-CM

## 2022-08-24 DIAGNOSIS — I31.4 PERICARDIAL EFFUSION WITH CARDIAC TAMPONADE: Primary | ICD-10-CM

## 2022-08-24 DIAGNOSIS — E27.49 SECONDARY ADRENAL INSUFFICIENCY (HCC): ICD-10-CM

## 2022-08-24 DIAGNOSIS — I31.39 PERICARDIAL EFFUSION: ICD-10-CM

## 2022-08-24 DIAGNOSIS — I31.4 CARDIAC TAMPONADE: ICD-10-CM

## 2022-08-24 LAB
ALBUMIN SERPL-MCNC: 4.2 G/DL (ref 3.5–5.2)
ALP BLD-CCNC: 34 U/L (ref 40–129)
ALT SERPL-CCNC: 10 U/L (ref 0–40)
ANION GAP SERPL CALCULATED.3IONS-SCNC: 12 MMOL/L (ref 7–16)
AST SERPL-CCNC: 26 U/L (ref 0–39)
BASOPHILS ABSOLUTE: 0.04 E9/L (ref 0–0.2)
BASOPHILS RELATIVE PERCENT: 0.5 % (ref 0–2)
BILIRUB SERPL-MCNC: 0.5 MG/DL (ref 0–1.2)
BUN BLDV-MCNC: 10 MG/DL (ref 6–20)
CALCIUM SERPL-MCNC: 8.9 MG/DL (ref 8.6–10.2)
CHLORIDE BLD-SCNC: 98 MMOL/L (ref 98–107)
CO2: 22 MMOL/L (ref 22–29)
CREAT SERPL-MCNC: 1.6 MG/DL (ref 0.7–1.2)
EOSINOPHILS ABSOLUTE: 0.12 E9/L (ref 0.05–0.5)
EOSINOPHILS RELATIVE PERCENT: 1.6 % (ref 0–6)
GFR AFRICAN AMERICAN: 55
GFR NON-AFRICAN AMERICAN: 55 ML/MIN/1.73
GLUCOSE BLD-MCNC: 84 MG/DL (ref 74–99)
HCT VFR BLD CALC: 35.6 % (ref 37–54)
HEMOGLOBIN: 11.7 G/DL (ref 12.5–16.5)
IMMATURE GRANULOCYTES #: 0.01 E9/L
IMMATURE GRANULOCYTES %: 0.1 % (ref 0–5)
LACTIC ACID, SEPSIS: 1.3 MMOL/L (ref 0.5–1.9)
LIPASE: 17 U/L (ref 13–60)
LYMPHOCYTES ABSOLUTE: 4.13 E9/L (ref 1.5–4)
LYMPHOCYTES RELATIVE PERCENT: 55.7 % (ref 20–42)
MAGNESIUM: 1.8 MG/DL (ref 1.6–2.6)
MCH RBC QN AUTO: 29.3 PG (ref 26–35)
MCHC RBC AUTO-ENTMCNC: 32.9 % (ref 32–34.5)
MCV RBC AUTO: 89 FL (ref 80–99.9)
MONOCYTES ABSOLUTE: 0.47 E9/L (ref 0.1–0.95)
MONOCYTES RELATIVE PERCENT: 6.3 % (ref 2–12)
NEUTROPHILS ABSOLUTE: 2.65 E9/L (ref 1.8–7.3)
NEUTROPHILS RELATIVE PERCENT: 35.8 % (ref 43–80)
PDW BLD-RTO: 15.7 FL (ref 11.5–15)
PLATELET # BLD: 170 E9/L (ref 130–450)
PMV BLD AUTO: 9.9 FL (ref 7–12)
POTASSIUM REFLEX MAGNESIUM: 3.3 MMOL/L (ref 3.5–5)
PRO-BNP: 178 PG/ML (ref 0–125)
RBC # BLD: 4 E12/L (ref 3.8–5.8)
SODIUM BLD-SCNC: 132 MMOL/L (ref 132–146)
TOTAL PROTEIN: 6.9 G/DL (ref 6.4–8.3)
TROPONIN, HIGH SENSITIVITY: 15 NG/L (ref 0–11)
WBC # BLD: 7.4 E9/L (ref 4.5–11.5)

## 2022-08-24 PROCEDURE — 83880 ASSAY OF NATRIURETIC PEPTIDE: CPT

## 2022-08-24 PROCEDURE — 86901 BLOOD TYPING SEROLOGIC RH(D): CPT

## 2022-08-24 PROCEDURE — 83605 ASSAY OF LACTIC ACID: CPT

## 2022-08-24 PROCEDURE — 86923 COMPATIBILITY TEST ELECTRIC: CPT

## 2022-08-24 PROCEDURE — 71275 CT ANGIOGRAPHY CHEST: CPT

## 2022-08-24 PROCEDURE — 6360000004 HC RX CONTRAST MEDICATION: Performed by: RADIOLOGY

## 2022-08-24 PROCEDURE — 85025 COMPLETE CBC W/AUTO DIFF WBC: CPT

## 2022-08-24 PROCEDURE — 83690 ASSAY OF LIPASE: CPT

## 2022-08-24 PROCEDURE — 36415 COLL VENOUS BLD VENIPUNCTURE: CPT

## 2022-08-24 PROCEDURE — 99285 EMERGENCY DEPT VISIT HI MDM: CPT

## 2022-08-24 PROCEDURE — 83735 ASSAY OF MAGNESIUM: CPT

## 2022-08-24 PROCEDURE — 86900 BLOOD TYPING SEROLOGIC ABO: CPT

## 2022-08-24 PROCEDURE — 93005 ELECTROCARDIOGRAM TRACING: CPT | Performed by: STUDENT IN AN ORGANIZED HEALTH CARE EDUCATION/TRAINING PROGRAM

## 2022-08-24 PROCEDURE — 74174 CTA ABD&PLVS W/CONTRAST: CPT

## 2022-08-24 PROCEDURE — 86850 RBC ANTIBODY SCREEN: CPT

## 2022-08-24 PROCEDURE — 80053 COMPREHEN METABOLIC PANEL: CPT

## 2022-08-24 PROCEDURE — 84484 ASSAY OF TROPONIN QUANT: CPT

## 2022-08-24 RX ORDER — 0.9 % SODIUM CHLORIDE 0.9 %
1000 INTRAVENOUS SOLUTION INTRAVENOUS ONCE
Status: COMPLETED | OUTPATIENT
Start: 2022-08-24 | End: 2022-08-25

## 2022-08-24 RX ORDER — POTASSIUM CHLORIDE 7.45 MG/ML
20 INJECTION INTRAVENOUS ONCE
Status: DISCONTINUED | OUTPATIENT
Start: 2022-08-25 | End: 2022-08-25

## 2022-08-24 RX ADMIN — IOPAMIDOL 90 ML: 755 INJECTION, SOLUTION INTRAVENOUS at 23:51

## 2022-08-24 ASSESSMENT — PAIN DESCRIPTION - LOCATION: LOCATION: ABDOMEN;BACK

## 2022-08-24 ASSESSMENT — PAIN SCALES - GENERAL: PAINLEVEL_OUTOF10: 10

## 2022-08-24 ASSESSMENT — PAIN DESCRIPTION - ORIENTATION: ORIENTATION: MID

## 2022-08-24 ASSESSMENT — PAIN - FUNCTIONAL ASSESSMENT: PAIN_FUNCTIONAL_ASSESSMENT: 0-10

## 2022-08-25 ENCOUNTER — APPOINTMENT (OUTPATIENT)
Dept: GENERAL RADIOLOGY | Age: 52
DRG: 270 | End: 2022-08-25
Payer: MEDICARE

## 2022-08-25 ENCOUNTER — ANESTHESIA (OUTPATIENT)
Dept: OPERATING ROOM | Age: 52
DRG: 270 | End: 2022-08-25
Payer: MEDICARE

## 2022-08-25 ENCOUNTER — ANESTHESIA EVENT (OUTPATIENT)
Dept: OPERATING ROOM | Age: 52
DRG: 270 | End: 2022-08-25
Payer: MEDICARE

## 2022-08-25 PROBLEM — I31.4 PERICARDIAL EFFUSION WITH CARDIAC TAMPONADE: Status: ACTIVE | Noted: 2022-08-25

## 2022-08-25 PROBLEM — I31.4 CARDIAC TAMPONADE: Status: ACTIVE | Noted: 2022-08-25

## 2022-08-25 PROBLEM — I31.39 PERICARDIAL EFFUSION: Status: ACTIVE | Noted: 2022-08-25

## 2022-08-25 LAB
AADO2: 236.4 MMHG
ABO/RH: NORMAL
ABO/RH: NORMAL
ADENOVIRUS BY PCR: NOT DETECTED
ALBUMIN SERPL-MCNC: 3.4 G/DL (ref 3.5–5.2)
ALP BLD-CCNC: 32 U/L (ref 40–129)
ALT SERPL-CCNC: 9 U/L (ref 0–40)
ANION GAP SERPL CALCULATED.3IONS-SCNC: 11 MMOL/L (ref 7–16)
ANION GAP SERPL CALCULATED.3IONS-SCNC: 9 MMOL/L (ref 7–16)
ANTIBODY SCREEN: NORMAL
ANTIBODY SCREEN: NORMAL
APPEARANCE FLUID: NORMAL
APTT: 38.9 SEC (ref 24.5–35.1)
AST SERPL-CCNC: 24 U/L (ref 0–39)
B.E.: -8 MMOL/L (ref -3–3)
BACTERIA: ABNORMAL /HPF
BASOPHILS ABSOLUTE: 0.03 E9/L (ref 0–0.2)
BASOPHILS RELATIVE PERCENT: 0.3 % (ref 0–2)
BILIRUB SERPL-MCNC: 0.5 MG/DL (ref 0–1.2)
BILIRUBIN URINE: NEGATIVE
BLOOD BANK DISPENSE STATUS: NORMAL
BLOOD BANK DISPENSE STATUS: NORMAL
BLOOD BANK PRODUCT CODE: NORMAL
BLOOD BANK PRODUCT CODE: NORMAL
BLOOD, URINE: ABNORMAL
BORDETELLA PARAPERTUSSIS BY PCR: NOT DETECTED
BORDETELLA PERTUSSIS BY PCR: NOT DETECTED
BPU ID: NORMAL
BPU ID: NORMAL
BUN BLDV-MCNC: 10 MG/DL (ref 6–20)
BUN BLDV-MCNC: 9 MG/DL (ref 6–20)
CALCIUM SERPL-MCNC: 7.5 MG/DL (ref 8.6–10.2)
CALCIUM SERPL-MCNC: 7.7 MG/DL (ref 8.6–10.2)
CELL COUNT FLUID TYPE: NORMAL
CHLAMYDOPHILIA PNEUMONIAE BY PCR: NOT DETECTED
CHLORIDE BLD-SCNC: 103 MMOL/L (ref 98–107)
CHLORIDE BLD-SCNC: 103 MMOL/L (ref 98–107)
CLARITY: CLEAR
CO2: 18 MMOL/L (ref 22–29)
CO2: 18 MMOL/L (ref 22–29)
COHB: 0.8 % (ref 0–1.5)
COLOR FLUID: NORMAL
COLOR: YELLOW
CORONAVIRUS 229E BY PCR: NOT DETECTED
CORONAVIRUS HKU1 BY PCR: NOT DETECTED
CORONAVIRUS NL63 BY PCR: NOT DETECTED
CORONAVIRUS OC43 BY PCR: NOT DETECTED
CORTISOL TOTAL: 2.67 MCG/DL (ref 2.68–18.4)
CORTISOL TOTAL: 2.75 MCG/DL (ref 2.68–18.4)
CREAT SERPL-MCNC: 1.3 MG/DL (ref 0.7–1.2)
CREAT SERPL-MCNC: 1.5 MG/DL (ref 0.7–1.2)
CRITICAL: ABNORMAL
DATE ANALYZED: ABNORMAL
DATE OF COLLECTION: ABNORMAL
DESCRIPTION BLOOD BANK: NORMAL
DESCRIPTION BLOOD BANK: NORMAL
EKG ATRIAL RATE: 86 BPM
EKG P AXIS: 47 DEGREES
EKG P-R INTERVAL: 164 MS
EKG Q-T INTERVAL: 484 MS
EKG QRS DURATION: 176 MS
EKG QTC CALCULATION (BAZETT): 579 MS
EKG R AXIS: -100 DEGREES
EKG T AXIS: 60 DEGREES
EKG VENTRICULAR RATE: 86 BPM
EOSINOPHILS ABSOLUTE: 0.07 E9/L (ref 0.05–0.5)
EOSINOPHILS RELATIVE PERCENT: 0.7 % (ref 0–6)
FIO2: 50 %
GFR AFRICAN AMERICAN: 60
GFR AFRICAN AMERICAN: >60
GFR NON-AFRICAN AMERICAN: 60 ML/MIN/1.73
GFR NON-AFRICAN AMERICAN: >60 ML/MIN/1.73
GLUCOSE BLD-MCNC: 126 MG/DL (ref 74–99)
GLUCOSE BLD-MCNC: 87 MG/DL (ref 74–99)
GLUCOSE URINE: NEGATIVE MG/DL
GRAM STAIN ORDERABLE: NORMAL
HCO3: 16.7 MMOL/L (ref 22–26)
HCT VFR BLD CALC: 34.4 % (ref 37–54)
HCT VFR BLD CALC: 36.6 % (ref 37–54)
HEMOGLOBIN: 12 G/DL (ref 12.5–16.5)
HEMOGLOBIN: 12.5 G/DL (ref 12.5–16.5)
HHB: 6.9 % (ref 0–5)
HUMAN METAPNEUMOVIRUS BY PCR: NOT DETECTED
HUMAN RHINOVIRUS/ENTEROVIRUS BY PCR: NOT DETECTED
IMMATURE GRANULOCYTES #: 0.02 E9/L
IMMATURE GRANULOCYTES %: 0.2 % (ref 0–5)
INFLUENZA A BY PCR: NOT DETECTED
INFLUENZA B BY PCR: NOT DETECTED
INR BLD: 1.4
KETONES, URINE: NEGATIVE MG/DL
LAB: ABNORMAL
LACTIC ACID: 2 MMOL/L (ref 0.5–2.2)
LEUKOCYTE ESTERASE, URINE: NEGATIVE
LV EF: 60 %
LVEF MODALITY: NORMAL
LYMPHOCYTES ABSOLUTE: 3.69 E9/L (ref 1.5–4)
LYMPHOCYTES RELATIVE PERCENT: 35.9 % (ref 20–42)
LYMPHOCYTES, BODY FLUID: 2 %
Lab: ABNORMAL
MAGNESIUM: 1.5 MG/DL (ref 1.6–2.6)
MCH RBC QN AUTO: 30.3 PG (ref 26–35)
MCH RBC QN AUTO: 30.3 PG (ref 26–35)
MCHC RBC AUTO-ENTMCNC: 34.2 % (ref 32–34.5)
MCHC RBC AUTO-ENTMCNC: 34.9 % (ref 32–34.5)
MCV RBC AUTO: 86.9 FL (ref 80–99.9)
MCV RBC AUTO: 88.6 FL (ref 80–99.9)
METHB: 0.4 % (ref 0–1.5)
MODE: AC
MONOCYTE, FLUID: 29 %
MONOCYTES ABSOLUTE: 0.61 E9/L (ref 0.1–0.95)
MONOCYTES RELATIVE PERCENT: 5.9 % (ref 2–12)
MYCOPLASMA PNEUMONIAE BY PCR: NOT DETECTED
NEUTROPHIL, FLUID: 69 %
NEUTROPHILS ABSOLUTE: 5.87 E9/L (ref 1.8–7.3)
NEUTROPHILS RELATIVE PERCENT: 57 % (ref 43–80)
NITRITE, URINE: NEGATIVE
NUCLEATED CELLS FLUID: NORMAL /UL
O2 SATURATION: 93 % (ref 92–98.5)
O2HB: 91.9 % (ref 94–97)
OPERATOR ID: 1741
PARAINFLUENZA VIRUS 1 BY PCR: NOT DETECTED
PARAINFLUENZA VIRUS 2 BY PCR: NOT DETECTED
PARAINFLUENZA VIRUS 3 BY PCR: NOT DETECTED
PARAINFLUENZA VIRUS 4 BY PCR: NOT DETECTED
PATIENT TEMP: 37 C
PCO2: 32 MMHG (ref 35–45)
PDW BLD-RTO: 15.3 FL (ref 11.5–15)
PDW BLD-RTO: 15.5 FL (ref 11.5–15)
PEEP/CPAP: 8 CMH2O
PFO2: 1.43 MMHG/%
PH BLOOD GAS: 7.34 (ref 7.35–7.45)
PH UA: 6 (ref 5–9)
PLATELET # BLD: 188 E9/L (ref 130–450)
PLATELET # BLD: 192 E9/L (ref 130–450)
PMV BLD AUTO: 10.6 FL (ref 7–12)
PMV BLD AUTO: 9.9 FL (ref 7–12)
PO2: 71.6 MMHG (ref 75–100)
POTASSIUM REFLEX MAGNESIUM: 3.8 MMOL/L (ref 3.5–5)
POTASSIUM SERPL-SCNC: 3.3 MMOL/L (ref 3.5–5)
PROTEIN UA: ABNORMAL MG/DL
PROTHROMBIN TIME: 15.2 SEC (ref 9.3–12.4)
RBC # BLD: 3.96 E12/L (ref 3.8–5.8)
RBC # BLD: 4.13 E12/L (ref 3.8–5.8)
RBC FLUID: NORMAL /UL
RBC UA: ABNORMAL /HPF (ref 0–2)
RESPIRATORY SYNCYTIAL VIRUS BY PCR: NOT DETECTED
RI(T): 3.3
RR MECHANICAL: 12 B/MIN
SARS-COV-2, PCR: NOT DETECTED
SODIUM BLD-SCNC: 130 MMOL/L (ref 132–146)
SODIUM BLD-SCNC: 132 MMOL/L (ref 132–146)
SOURCE, BLOOD GAS: ABNORMAL
SPECIFIC GRAVITY UA: <=1.005 (ref 1–1.03)
T4 FREE: <0.1 NG/DL (ref 0.93–1.7)
THB: 12.8 G/DL (ref 11.5–16.5)
TIME ANALYZED: 1040
TOTAL PROTEIN: 5.6 G/DL (ref 6.4–8.3)
TROPONIN, HIGH SENSITIVITY: 16 NG/L (ref 0–11)
TSH SERPL DL<=0.05 MIU/L-ACNC: 0.06 UIU/ML (ref 0.27–4.2)
UROBILINOGEN, URINE: 0.2 E.U./DL
VT MECHANICAL: 500 ML
WBC # BLD: 10.3 E9/L (ref 4.5–11.5)
WBC # BLD: 15.5 E9/L (ref 4.5–11.5)
WBC UA: ABNORMAL /HPF (ref 0–5)

## 2022-08-25 PROCEDURE — 87102 FUNGUS ISOLATION CULTURE: CPT

## 2022-08-25 PROCEDURE — 36620 INSERTION CATHETER ARTERY: CPT

## 2022-08-25 PROCEDURE — 84439 ASSAY OF FREE THYROXINE: CPT

## 2022-08-25 PROCEDURE — 85730 THROMBOPLASTIN TIME PARTIAL: CPT

## 2022-08-25 PROCEDURE — 93306 TTE W/DOPPLER COMPLETE: CPT

## 2022-08-25 PROCEDURE — 88112 CYTOPATH CELL ENHANCE TECH: CPT

## 2022-08-25 PROCEDURE — 37799 UNLISTED PX VASCULAR SURGERY: CPT

## 2022-08-25 PROCEDURE — 94664 DEMO&/EVAL PT USE INHALER: CPT

## 2022-08-25 PROCEDURE — 86901 BLOOD TYPING SEROLOGIC RH(D): CPT

## 2022-08-25 PROCEDURE — 86923 COMPATIBILITY TEST ELECTRIC: CPT

## 2022-08-25 PROCEDURE — 84484 ASSAY OF TROPONIN QUANT: CPT

## 2022-08-25 PROCEDURE — 6360000002 HC RX W HCPCS: Performed by: NURSE PRACTITIONER

## 2022-08-25 PROCEDURE — 71045 X-RAY EXAM CHEST 1 VIEW: CPT

## 2022-08-25 PROCEDURE — 87205 SMEAR GRAM STAIN: CPT

## 2022-08-25 PROCEDURE — 82805 BLOOD GASES W/O2 SATURATION: CPT

## 2022-08-25 PROCEDURE — 0BH17EZ INSERTION OF ENDOTRACHEAL AIRWAY INTO TRACHEA, VIA NATURAL OR ARTIFICIAL OPENING: ICD-10-PCS | Performed by: INTERNAL MEDICINE

## 2022-08-25 PROCEDURE — 87040 BLOOD CULTURE FOR BACTERIA: CPT

## 2022-08-25 PROCEDURE — 2580000003 HC RX 258: Performed by: STUDENT IN AN ORGANIZED HEALTH CARE EDUCATION/TRAINING PROGRAM

## 2022-08-25 PROCEDURE — 0202U NFCT DS 22 TRGT SARS-COV-2: CPT

## 2022-08-25 PROCEDURE — 5A1945Z RESPIRATORY VENTILATION, 24-96 CONSECUTIVE HOURS: ICD-10-PCS | Performed by: INTERNAL MEDICINE

## 2022-08-25 PROCEDURE — 87070 CULTURE OTHR SPECIMN AEROBIC: CPT

## 2022-08-25 PROCEDURE — P9045 ALBUMIN (HUMAN), 5%, 250 ML: HCPCS | Performed by: STUDENT IN AN ORGANIZED HEALTH CARE EDUCATION/TRAINING PROGRAM

## 2022-08-25 PROCEDURE — 87206 SMEAR FLUORESCENT/ACID STAI: CPT

## 2022-08-25 PROCEDURE — 33025 INCISION OF HEART SAC: CPT | Performed by: STUDENT IN AN ORGANIZED HEALTH CARE EDUCATION/TRAINING PROGRAM

## 2022-08-25 PROCEDURE — 6360000002 HC RX W HCPCS: Performed by: STUDENT IN AN ORGANIZED HEALTH CARE EDUCATION/TRAINING PROGRAM

## 2022-08-25 PROCEDURE — 6370000000 HC RX 637 (ALT 250 FOR IP): Performed by: NURSE PRACTITIONER

## 2022-08-25 PROCEDURE — 3600000018 HC SURGERY OHS ADDTL 15MIN: Performed by: STUDENT IN AN ORGANIZED HEALTH CARE EDUCATION/TRAINING PROGRAM

## 2022-08-25 PROCEDURE — 36415 COLL VENOUS BLD VENIPUNCTURE: CPT

## 2022-08-25 PROCEDURE — 74018 RADEX ABDOMEN 1 VIEW: CPT

## 2022-08-25 PROCEDURE — 86850 RBC ANTIBODY SCREEN: CPT

## 2022-08-25 PROCEDURE — 94640 AIRWAY INHALATION TREATMENT: CPT

## 2022-08-25 PROCEDURE — 2580000003 HC RX 258: Performed by: NURSE ANESTHETIST, CERTIFIED REGISTERED

## 2022-08-25 PROCEDURE — 88341 IMHCHEM/IMCYTCHM EA ADD ANTB: CPT

## 2022-08-25 PROCEDURE — 0W9D0ZZ DRAINAGE OF PERICARDIAL CAVITY, OPEN APPROACH: ICD-10-PCS | Performed by: STUDENT IN AN ORGANIZED HEALTH CARE EDUCATION/TRAINING PROGRAM

## 2022-08-25 PROCEDURE — 81001 URINALYSIS AUTO W/SCOPE: CPT

## 2022-08-25 PROCEDURE — 6360000002 HC RX W HCPCS: Performed by: NURSE ANESTHETIST, CERTIFIED REGISTERED

## 2022-08-25 PROCEDURE — 85027 COMPLETE CBC AUTOMATED: CPT

## 2022-08-25 PROCEDURE — 31500 INSERT EMERGENCY AIRWAY: CPT

## 2022-08-25 PROCEDURE — 85025 COMPLETE CBC W/AUTO DIFF WBC: CPT

## 2022-08-25 PROCEDURE — 7100000000 HC PACU RECOVERY - FIRST 15 MIN

## 2022-08-25 PROCEDURE — 2580000003 HC RX 258: Performed by: NURSE PRACTITIONER

## 2022-08-25 PROCEDURE — 3700000000 HC ANESTHESIA ATTENDED CARE: Performed by: STUDENT IN AN ORGANIZED HEALTH CARE EDUCATION/TRAINING PROGRAM

## 2022-08-25 PROCEDURE — 80053 COMPREHEN METABOLIC PANEL: CPT

## 2022-08-25 PROCEDURE — 82533 TOTAL CORTISOL: CPT

## 2022-08-25 PROCEDURE — 88305 TISSUE EXAM BY PATHOLOGIST: CPT

## 2022-08-25 PROCEDURE — 83735 ASSAY OF MAGNESIUM: CPT

## 2022-08-25 PROCEDURE — 87075 CULTR BACTERIA EXCEPT BLOOD: CPT

## 2022-08-25 PROCEDURE — 2709999900 HC NON-CHARGEABLE SUPPLY: Performed by: STUDENT IN AN ORGANIZED HEALTH CARE EDUCATION/TRAINING PROGRAM

## 2022-08-25 PROCEDURE — 84443 ASSAY THYROID STIM HORMONE: CPT

## 2022-08-25 PROCEDURE — 36556 INSERT NON-TUNNEL CV CATH: CPT

## 2022-08-25 PROCEDURE — 83605 ASSAY OF LACTIC ACID: CPT

## 2022-08-25 PROCEDURE — 33025 INCISION OF HEART SAC: CPT | Performed by: PHYSICIAN ASSISTANT

## 2022-08-25 PROCEDURE — 94002 VENT MGMT INPAT INIT DAY: CPT

## 2022-08-25 PROCEDURE — 2500000003 HC RX 250 WO HCPCS: Performed by: NURSE PRACTITIONER

## 2022-08-25 PROCEDURE — 7100000001 HC PACU RECOVERY - ADDTL 15 MIN

## 2022-08-25 PROCEDURE — 87081 CULTURE SCREEN ONLY: CPT

## 2022-08-25 PROCEDURE — 2580000003 HC RX 258: Performed by: SURGERY

## 2022-08-25 PROCEDURE — 36592 COLLECT BLOOD FROM PICC: CPT

## 2022-08-25 PROCEDURE — 86900 BLOOD TYPING SEROLOGIC ABO: CPT

## 2022-08-25 PROCEDURE — 2500000003 HC RX 250 WO HCPCS: Performed by: NURSE ANESTHETIST, CERTIFIED REGISTERED

## 2022-08-25 PROCEDURE — 99222 1ST HOSP IP/OBS MODERATE 55: CPT | Performed by: STUDENT IN AN ORGANIZED HEALTH CARE EDUCATION/TRAINING PROGRAM

## 2022-08-25 PROCEDURE — 85610 PROTHROMBIN TIME: CPT

## 2022-08-25 PROCEDURE — 2700000000 HC OXYGEN THERAPY PER DAY

## 2022-08-25 PROCEDURE — 6370000000 HC RX 637 (ALT 250 FOR IP): Performed by: SURGERY

## 2022-08-25 PROCEDURE — 2000000000 HC ICU R&B

## 2022-08-25 PROCEDURE — 6360000002 HC RX W HCPCS: Performed by: SURGERY

## 2022-08-25 PROCEDURE — 88342 IMHCHEM/IMCYTCHM 1ST ANTB: CPT

## 2022-08-25 PROCEDURE — 2500000003 HC RX 250 WO HCPCS: Performed by: EMERGENCY MEDICINE

## 2022-08-25 PROCEDURE — 80048 BASIC METABOLIC PNL TOTAL CA: CPT

## 2022-08-25 PROCEDURE — 93005 ELECTROCARDIOGRAM TRACING: CPT | Performed by: EMERGENCY MEDICINE

## 2022-08-25 PROCEDURE — 2500000003 HC RX 250 WO HCPCS: Performed by: STUDENT IN AN ORGANIZED HEALTH CARE EDUCATION/TRAINING PROGRAM

## 2022-08-25 PROCEDURE — 3600000008 HC SURGERY OHS BASE: Performed by: STUDENT IN AN ORGANIZED HEALTH CARE EDUCATION/TRAINING PROGRAM

## 2022-08-25 PROCEDURE — 89051 BODY FLUID CELL COUNT: CPT

## 2022-08-25 PROCEDURE — 99291 CRITICAL CARE FIRST HOUR: CPT | Performed by: SURGERY

## 2022-08-25 PROCEDURE — 87116 MYCOBACTERIA CULTURE: CPT

## 2022-08-25 PROCEDURE — 87015 SPECIMEN INFECT AGNT CONCNTJ: CPT

## 2022-08-25 PROCEDURE — 3700000001 HC ADD 15 MINUTES (ANESTHESIA): Performed by: STUDENT IN AN ORGANIZED HEALTH CARE EDUCATION/TRAINING PROGRAM

## 2022-08-25 RX ORDER — ALBUMIN, HUMAN INJ 5% 5 %
25 SOLUTION INTRAVENOUS ONCE
Status: COMPLETED | OUTPATIENT
Start: 2022-08-25 | End: 2022-08-25

## 2022-08-25 RX ORDER — OXYCODONE HYDROCHLORIDE 5 MG/1
5 TABLET ORAL EVERY 4 HOURS PRN
Status: DISCONTINUED | OUTPATIENT
Start: 2022-08-25 | End: 2022-08-25

## 2022-08-25 RX ORDER — MAGNESIUM SULFATE IN WATER 40 MG/ML
4000 INJECTION, SOLUTION INTRAVENOUS ONCE
Status: COMPLETED | OUTPATIENT
Start: 2022-08-25 | End: 2022-08-25

## 2022-08-25 RX ORDER — SODIUM CHLORIDE 9 MG/ML
INJECTION, SOLUTION INTRAVENOUS CONTINUOUS PRN
Status: DISCONTINUED | OUTPATIENT
Start: 2022-08-25 | End: 2022-08-25 | Stop reason: SDUPTHER

## 2022-08-25 RX ORDER — LEVOTHYROXINE SODIUM 137 UG/1
137 TABLET ORAL DAILY
Status: DISCONTINUED | OUTPATIENT
Start: 2022-08-25 | End: 2022-08-26

## 2022-08-25 RX ORDER — ACETAMINOPHEN 325 MG/1
650 TABLET ORAL EVERY 6 HOURS PRN
Status: DISCONTINUED | OUTPATIENT
Start: 2022-08-25 | End: 2022-08-25

## 2022-08-25 RX ORDER — FENTANYL CITRATE 50 UG/ML
INJECTION, SOLUTION INTRAMUSCULAR; INTRAVENOUS PRN
Status: DISCONTINUED | OUTPATIENT
Start: 2022-08-25 | End: 2022-08-25 | Stop reason: SDUPTHER

## 2022-08-25 RX ORDER — CEFAZOLIN SODIUM 1 G/3ML
INJECTION, POWDER, FOR SOLUTION INTRAMUSCULAR; INTRAVENOUS PRN
Status: DISCONTINUED | OUTPATIENT
Start: 2022-08-25 | End: 2022-08-25 | Stop reason: SDUPTHER

## 2022-08-25 RX ORDER — IPRATROPIUM BROMIDE AND ALBUTEROL SULFATE 2.5; .5 MG/3ML; MG/3ML
1 SOLUTION RESPIRATORY (INHALATION)
Status: DISCONTINUED | OUTPATIENT
Start: 2022-08-25 | End: 2022-08-29

## 2022-08-25 RX ORDER — LIDOCAINE HYDROCHLORIDE 10 MG/ML
INJECTION, SOLUTION INFILTRATION; PERINEURAL
Status: DISCONTINUED
Start: 2022-08-25 | End: 2022-08-25 | Stop reason: WASHOUT

## 2022-08-25 RX ORDER — PROMETHAZINE HYDROCHLORIDE 12.5 MG/1
12.5 TABLET ORAL EVERY 6 HOURS PRN
Status: DISCONTINUED | OUTPATIENT
Start: 2022-08-25 | End: 2022-08-25

## 2022-08-25 RX ORDER — OXYCODONE HCL 5 MG/5 ML
5 SOLUTION, ORAL ORAL EVERY 4 HOURS PRN
Status: DISCONTINUED | OUTPATIENT
Start: 2022-08-25 | End: 2022-08-30 | Stop reason: HOSPADM

## 2022-08-25 RX ORDER — SODIUM CHLORIDE 0.9 % (FLUSH) 0.9 %
5-40 SYRINGE (ML) INJECTION EVERY 12 HOURS SCHEDULED
Status: DISCONTINUED | OUTPATIENT
Start: 2022-08-25 | End: 2022-08-29

## 2022-08-25 RX ORDER — ENOXAPARIN SODIUM 100 MG/ML
40 INJECTION SUBCUTANEOUS DAILY
Status: DISCONTINUED | OUTPATIENT
Start: 2022-08-25 | End: 2022-08-25

## 2022-08-25 RX ORDER — ENOXAPARIN SODIUM 100 MG/ML
30 INJECTION SUBCUTANEOUS DAILY
Status: DISCONTINUED | OUTPATIENT
Start: 2022-08-26 | End: 2022-08-25

## 2022-08-25 RX ORDER — SODIUM CHLORIDE 0.9 % (FLUSH) 0.9 %
5-40 SYRINGE (ML) INJECTION PRN
Status: DISCONTINUED | OUTPATIENT
Start: 2022-08-25 | End: 2022-08-29

## 2022-08-25 RX ORDER — MORPHINE SULFATE 2 MG/ML
2 INJECTION, SOLUTION INTRAMUSCULAR; INTRAVENOUS
Status: DISCONTINUED | OUTPATIENT
Start: 2022-08-25 | End: 2022-08-25

## 2022-08-25 RX ORDER — SODIUM CHLORIDE 9 MG/ML
INJECTION, SOLUTION INTRAVENOUS CONTINUOUS
Status: DISCONTINUED | OUTPATIENT
Start: 2022-08-25 | End: 2022-08-26

## 2022-08-25 RX ORDER — ONDANSETRON 2 MG/ML
4 INJECTION INTRAMUSCULAR; INTRAVENOUS EVERY 6 HOURS PRN
Status: DISCONTINUED | OUTPATIENT
Start: 2022-08-25 | End: 2022-08-25

## 2022-08-25 RX ORDER — PROPOFOL 10 MG/ML
5-50 INJECTION, EMULSION INTRAVENOUS CONTINUOUS
Status: DISCONTINUED | OUTPATIENT
Start: 2022-08-25 | End: 2022-08-26

## 2022-08-25 RX ORDER — SODIUM CHLORIDE 0.9 % (FLUSH) 0.9 %
10 SYRINGE (ML) INJECTION PRN
Status: DISCONTINUED | OUTPATIENT
Start: 2022-08-25 | End: 2022-08-25 | Stop reason: SDUPTHER

## 2022-08-25 RX ORDER — SODIUM CHLORIDE 9 MG/ML
INJECTION, SOLUTION INTRAVENOUS PRN
Status: DISCONTINUED | OUTPATIENT
Start: 2022-08-25 | End: 2022-08-29

## 2022-08-25 RX ORDER — EPINEPHRINE 1 MG/ML
INJECTION INTRAMUSCULAR; INTRAVENOUS; SUBCUTANEOUS PRN
Status: DISCONTINUED | OUTPATIENT
Start: 2022-08-25 | End: 2022-08-25 | Stop reason: SDUPTHER

## 2022-08-25 RX ORDER — SENNA AND DOCUSATE SODIUM 50; 8.6 MG/1; MG/1
1 TABLET, FILM COATED ORAL 2 TIMES DAILY
Status: DISCONTINUED | OUTPATIENT
Start: 2022-08-26 | End: 2022-08-30 | Stop reason: HOSPADM

## 2022-08-25 RX ORDER — FENTANYL CITRATE 50 UG/ML
50 INJECTION, SOLUTION INTRAMUSCULAR; INTRAVENOUS
Status: DISCONTINUED | OUTPATIENT
Start: 2022-08-25 | End: 2022-08-30 | Stop reason: HOSPADM

## 2022-08-25 RX ORDER — ACETAMINOPHEN 650 MG/1
650 SUPPOSITORY RECTAL EVERY 6 HOURS PRN
Status: DISCONTINUED | OUTPATIENT
Start: 2022-08-25 | End: 2022-08-25

## 2022-08-25 RX ORDER — SODIUM CHLORIDE 9 MG/ML
INJECTION, SOLUTION INTRAVENOUS PRN
Status: DISCONTINUED | OUTPATIENT
Start: 2022-08-25 | End: 2022-08-25

## 2022-08-25 RX ORDER — ROCURONIUM BROMIDE 10 MG/ML
INJECTION, SOLUTION INTRAVENOUS PRN
Status: DISCONTINUED | OUTPATIENT
Start: 2022-08-25 | End: 2022-08-25 | Stop reason: SDUPTHER

## 2022-08-25 RX ORDER — ONDANSETRON 2 MG/ML
4 INJECTION INTRAMUSCULAR; INTRAVENOUS EVERY 6 HOURS PRN
Status: DISCONTINUED | OUTPATIENT
Start: 2022-08-25 | End: 2022-08-25 | Stop reason: SDUPTHER

## 2022-08-25 RX ORDER — ETOMIDATE 2 MG/ML
INJECTION INTRAVENOUS PRN
Status: DISCONTINUED | OUTPATIENT
Start: 2022-08-25 | End: 2022-08-25 | Stop reason: SDUPTHER

## 2022-08-25 RX ORDER — ACETAMINOPHEN 325 MG/1
650 TABLET ORAL EVERY 6 HOURS
Status: DISCONTINUED | OUTPATIENT
Start: 2022-08-25 | End: 2022-08-30 | Stop reason: HOSPADM

## 2022-08-25 RX ORDER — POLYETHYLENE GLYCOL 3350 17 G/17G
17 POWDER, FOR SOLUTION ORAL DAILY PRN
Status: DISCONTINUED | OUTPATIENT
Start: 2022-08-25 | End: 2022-08-26 | Stop reason: SDUPTHER

## 2022-08-25 RX ORDER — SUCCINYLCHOLINE/SOD CL,ISO/PF 200MG/10ML
SYRINGE (ML) INTRAVENOUS PRN
Status: DISCONTINUED | OUTPATIENT
Start: 2022-08-25 | End: 2022-08-25 | Stop reason: SDUPTHER

## 2022-08-25 RX ORDER — SODIUM CHLORIDE 9 MG/ML
INJECTION, SOLUTION INTRAVENOUS PRN
Status: DISCONTINUED | OUTPATIENT
Start: 2022-08-25 | End: 2022-08-25 | Stop reason: SDUPTHER

## 2022-08-25 RX ORDER — ENOXAPARIN SODIUM 100 MG/ML
40 INJECTION SUBCUTANEOUS DAILY
Status: DISCONTINUED | OUTPATIENT
Start: 2022-08-26 | End: 2022-08-30 | Stop reason: HOSPADM

## 2022-08-25 RX ORDER — ALBUMIN, HUMAN INJ 5% 5 %
SOLUTION INTRAVENOUS
Status: DISCONTINUED
Start: 2022-08-25 | End: 2022-08-25

## 2022-08-25 RX ORDER — ENOXAPARIN SODIUM 100 MG/ML
40 INJECTION SUBCUTANEOUS DAILY
Status: DISCONTINUED | OUTPATIENT
Start: 2022-08-25 | End: 2022-08-25 | Stop reason: SDUPTHER

## 2022-08-25 RX ORDER — ALBUMIN, HUMAN INJ 5% 5 %
50 SOLUTION INTRAVENOUS ONCE
Status: DISCONTINUED | OUTPATIENT
Start: 2022-08-25 | End: 2022-08-25

## 2022-08-25 RX ORDER — ALBUMIN (HUMAN) 12.5 G/50ML
50 SOLUTION INTRAVENOUS ONCE
Status: DISCONTINUED | OUTPATIENT
Start: 2022-08-25 | End: 2022-08-25

## 2022-08-25 RX ORDER — PANTOPRAZOLE SODIUM 40 MG/1
40 TABLET, DELAYED RELEASE ORAL 2 TIMES DAILY
Status: DISCONTINUED | OUTPATIENT
Start: 2022-08-25 | End: 2022-08-30 | Stop reason: HOSPADM

## 2022-08-25 RX ORDER — SODIUM CHLORIDE 0.9 % (FLUSH) 0.9 %
10 SYRINGE (ML) INJECTION EVERY 12 HOURS SCHEDULED
Status: DISCONTINUED | OUTPATIENT
Start: 2022-08-25 | End: 2022-08-25 | Stop reason: SDUPTHER

## 2022-08-25 RX ADMIN — Medication 40 MCG/MIN: at 15:30

## 2022-08-25 RX ADMIN — IPRATROPIUM BROMIDE AND ALBUTEROL SULFATE 1 AMPULE: 2.5; .5 SOLUTION RESPIRATORY (INHALATION) at 20:04

## 2022-08-25 RX ADMIN — FENTANYL CITRATE 100 MCG: 50 INJECTION, SOLUTION INTRAMUSCULAR; INTRAVENOUS at 09:10

## 2022-08-25 RX ADMIN — Medication 120 MG: at 09:00

## 2022-08-25 RX ADMIN — Medication 5 MCG/MIN: at 03:06

## 2022-08-25 RX ADMIN — FENTANYL CITRATE 100 MCG: 50 INJECTION, SOLUTION INTRAMUSCULAR; INTRAVENOUS at 09:29

## 2022-08-25 RX ADMIN — IPRATROPIUM BROMIDE AND ALBUTEROL SULFATE 1 AMPULE: 2.5; .5 SOLUTION RESPIRATORY (INHALATION) at 10:54

## 2022-08-25 RX ADMIN — HYDROCORTISONE SODIUM SUCCINATE 50 MG: 100 INJECTION, POWDER, FOR SOLUTION INTRAMUSCULAR; INTRAVENOUS at 22:00

## 2022-08-25 RX ADMIN — SODIUM CHLORIDE 1000 ML: 9 INJECTION, SOLUTION INTRAVENOUS at 01:00

## 2022-08-25 RX ADMIN — PROPOFOL 40 MCG/KG/MIN: 10 INJECTION, EMULSION INTRAVENOUS at 20:07

## 2022-08-25 RX ADMIN — IPRATROPIUM BROMIDE AND ALBUTEROL SULFATE 1 AMPULE: 2.5; .5 SOLUTION RESPIRATORY (INHALATION) at 15:01

## 2022-08-25 RX ADMIN — ACETAMINOPHEN 650 MG: 650 SUPPOSITORY RECTAL at 10:39

## 2022-08-25 RX ADMIN — OXYCODONE HYDROCHLORIDE 5 MG: 5 SOLUTION ORAL at 16:32

## 2022-08-25 RX ADMIN — ACETAMINOPHEN 650 MG: 325 TABLET, FILM COATED ORAL at 15:26

## 2022-08-25 RX ADMIN — FENTANYL CITRATE 50 MCG: 50 INJECTION, SOLUTION INTRAMUSCULAR; INTRAVENOUS at 15:03

## 2022-08-25 RX ADMIN — ALBUMIN (HUMAN) 25 G: 12.5 INJECTION, SOLUTION INTRAVENOUS at 07:56

## 2022-08-25 RX ADMIN — EPINEPHRINE 10 MCG: 1 INJECTION INTRAMUSCULAR; INTRAVENOUS; SUBCUTANEOUS at 09:00

## 2022-08-25 RX ADMIN — MAGNESIUM SULFATE HEPTAHYDRATE 4000 MG: 40 INJECTION, SOLUTION INTRAVENOUS at 12:19

## 2022-08-25 RX ADMIN — CEFAZOLIN 2000 MG: 2 INJECTION, POWDER, FOR SOLUTION INTRAMUSCULAR; INTRAVENOUS at 17:37

## 2022-08-25 RX ADMIN — ACETAMINOPHEN 650 MG: 325 TABLET, FILM COATED ORAL at 21:59

## 2022-08-25 RX ADMIN — SODIUM CHLORIDE, PRESERVATIVE FREE 10 ML: 5 INJECTION INTRAVENOUS at 10:41

## 2022-08-25 RX ADMIN — CEFAZOLIN 2000 MG: 1 INJECTION, POWDER, FOR SOLUTION INTRAMUSCULAR; INTRAVENOUS at 09:07

## 2022-08-25 RX ADMIN — PROPOFOL 20 MCG/KG/MIN: 10 INJECTION, EMULSION INTRAVENOUS at 10:21

## 2022-08-25 RX ADMIN — SODIUM CHLORIDE: 9 INJECTION, SOLUTION INTRAVENOUS at 13:52

## 2022-08-25 RX ADMIN — Medication 40 MCG/MIN: at 22:04

## 2022-08-25 RX ADMIN — HYDROCORTISONE SODIUM SUCCINATE 50 MG: 100 INJECTION, POWDER, FOR SOLUTION INTRAMUSCULAR; INTRAVENOUS at 17:37

## 2022-08-25 RX ADMIN — ROCURONIUM BROMIDE 50 MG: 10 INJECTION, SOLUTION INTRAVENOUS at 09:11

## 2022-08-25 RX ADMIN — VASOPRESSIN 0.01 UNITS/MIN: 20 INJECTION INTRAVENOUS at 22:03

## 2022-08-25 RX ADMIN — ETOMIDATE 14 MG: 20 INJECTION, SOLUTION INTRAVENOUS at 09:00

## 2022-08-25 RX ADMIN — VASOPRESSIN 0.01 UNITS/MIN: 20 INJECTION INTRAVENOUS at 08:13

## 2022-08-25 RX ADMIN — SODIUM CHLORIDE, PRESERVATIVE FREE 10 ML: 5 INJECTION INTRAVENOUS at 19:44

## 2022-08-25 RX ADMIN — PROPOFOL 35 MCG/KG/MIN: 10 INJECTION, EMULSION INTRAVENOUS at 15:29

## 2022-08-25 RX ADMIN — SODIUM CHLORIDE: 9 INJECTION, SOLUTION INTRAVENOUS at 08:46

## 2022-08-25 ASSESSMENT — PULMONARY FUNCTION TESTS
PIF_VALUE: 20
PIF_VALUE: 20
PIF_VALUE: 22
PIF_VALUE: 21
PIF_VALUE: 22
PIF_VALUE: 20
PIF_VALUE: 22
PIF_VALUE: 20
PIF_VALUE: 21
PIF_VALUE: 24
PIF_VALUE: 21
PIF_VALUE: 20
PIF_VALUE: 20
PIF_VALUE: 24
PIF_VALUE: 21
PIF_VALUE: 20

## 2022-08-25 ASSESSMENT — PAIN DESCRIPTION - LOCATION
LOCATION: CHEST
LOCATION: GENERALIZED

## 2022-08-25 ASSESSMENT — PAIN SCALES - GENERAL
PAINLEVEL_OUTOF10: 0
PAINLEVEL_OUTOF10: 3
PAINLEVEL_OUTOF10: 8
PAINLEVEL_OUTOF10: 0
PAINLEVEL_OUTOF10: 0
PAINLEVEL_OUTOF10: 4
PAINLEVEL_OUTOF10: 0

## 2022-08-25 ASSESSMENT — PAIN DESCRIPTION - DESCRIPTORS
DESCRIPTORS: DISCOMFORT
DESCRIPTORS: OTHER (COMMENT)

## 2022-08-25 ASSESSMENT — LIFESTYLE VARIABLES: SMOKING_STATUS: 1

## 2022-08-25 ASSESSMENT — PAIN DESCRIPTION - ORIENTATION: ORIENTATION: OTHER (COMMENT)

## 2022-08-25 NOTE — PLAN OF CARE
Problem: Pain  Goal: Verbalizes/displays adequate comfort level or baseline comfort level  8/25/2022 0909 by Jaquelin Juarez RN  Outcome: Progressing  Flowsheets (Taken 8/25/2022 0909)  Verbalizes/displays adequate comfort level or baseline comfort level: Assess pain using appropriate pain scale     Problem: Safety - Adult  Goal: Free from fall injury  8/25/2022 0909 by Jaquelin Juarez RN  Outcome: Progressing  Flowsheets (Taken 8/25/2022 0909)  Free From Fall Injury: Instruct family/caregiver on patient safety     Problem: Respiratory - Adult  Goal: Achieves optimal ventilation and oxygenation  8/25/2022 0909 by Jaquelin Juarez RN  Outcome: Progressing  Flowsheets (Taken 8/25/2022 0909)  Achieves optimal ventilation and oxygenation:   Assess for changes in respiratory status   Position to facilitate oxygenation and minimize respiratory effort     Problem: Cardiovascular - Adult  Goal: Maintains optimal cardiac output and hemodynamic stability  8/25/2022 0909 by Jaquelin Juarez RN  Outcome: Progressing  Flowsheets (Taken 8/25/2022 0909)  Maintains optimal cardiac output and hemodynamic stability:   Monitor blood pressure and heart rate   Assess for signs of decreased cardiac output

## 2022-08-25 NOTE — ED NOTES
8/25/22  4:37 AM EDT      Patient presently admitted and boarded in the department pending bed availability. Intervention required by emergency physician. Interval HPI: This is a 77-year-old male presented to the ED for chest pain shortness of breath. Patient was brought in as an ICU admitted for pericardial effusion with tamponade. Interval physical exam:     Constitutional/General: Alert and oriented x3 agitated  Head: Normocephalic and atraumatic  Eyes: PERRL, EOMI, sclera non icteric  Mouth: Oropharynx clear, handling secretions,   Neck: Supple, full ROM, no stridor, no meningeal signs  Respiratory: Lungs clear to auscultation bilaterally,Not in respiratory distress    Cardiovascular:  Regular rate. Regular rhythm. 2+ distal pulses. Equal extremity pulses. GI:  Abdomen Soft, Non tender, Non distended. No rebound, guarding, or rigidity. Musculoskeletal: Moves all extremities x 4. Warm and well perfused,  Capillary refill <3 seconds  Integument: skin warm and dry. No rashes. Neurologic: Glascow Coma Scale  Best Eye Response 4 - Opens eyes on own   Best Verbal Response 5 - Alert and oriented   Best Motor Response 6 - Follows simple motor commands   Total 15         Medications   potassium chloride 10 mEq/100 mL IVPB (Peripheral Line) (has no administration in time range)   perflutren lipid microspheres (DEFINITY) injection 1.65 mg (has no administration in time range)   norepinephrine (LEVOPHED) 16 mg in dextrose 5% 250 mL infusion (30 mcg/min IntraVENous Rate/Dose Change 8/25/22 0329)   0.9 % sodium chloride bolus (1,000 mLs IntraVENous New Bag 8/25/22 0100)   iopamidol (ISOVUE-370) 76 % injection 90 mL (90 mLs IntraVENous Given 8/24/22 4776)       Vitals:    08/25/22 0415   BP: 109/81   Pulse:    Resp: 20   Temp:    SpO2: 96%         Oxygen Saturation Interpretation: Normal    The cardiac monitor revealed sinus rhythm with a heart rate in the 90s as interpreted by me.  The cardiac monitor was ordered secondary to the patient's heart rate and to monitor the patient for dysrhythmia. CPT [id number]    Time: 8762  Re-evaluation. Patients symptoms are improving  Repeat physical examination is significantly improved      MDM: This is a 54-year-old male who was admitted to the ICU for pericardial effusion with tamponade. Patient had a pericardiocentesis earlier today with significant provement in his presentation. While patient was here boarding waiting for an ICU bed the patient became unstable hypotensive symptomatic with chest pain and shortness of breath. Patient's blood pressure dropped into the 85W systolic. Central line was placed and Levophed was started with no improvement in the patient's blood pressure. Therefore patient underwent second pericardiocentesis after CT surgery was consulted. After pericardiocentesis patient's blood pressure significantly improved. Patient stable at this time. Patient admitted to the CVICU. Did speak to cardiology who will arrange an echo first thing in the morning for the patient for CT surgery. Pericardiocentesis Procedure Note     Indication: Therapeutic removal of fluid from the pericardial space     Attending:Elvis Rizvi DO     Consent was not able to be obtained due to emergent nature of the procedure and a time-out was completed verifying correct patient, procedure, site, and positioning. The patients thorax was prepped and draped in sterile fashion. 1% Lidocaine was used to anesthetize the surrounding skin area. Ultrasound was used to identify the fluid and observe the needle entering the pericardial space. The needle was introduced into the pericardial space. Appropriate fluid return was obtained and fluid was removed for study. The needle was then removed. The patient tolerated the procedure well and there were no complications. Blood loss was minimal.Chest x-ray was performed to assess for pneumothorax.         Post pericardiocentesis chest x-ray showing no pneumothorax. PROCEDURE  8/25/22           CENTRAL LINE INSERTION  Risks, benefits and alternatives (for applicable procedures below) described. Performed By: EM Attending Physician. Indication: vascular access and inability to gain peripheral access. Informed consent: Verbal consent obtained. The patient was counseled regarding the procedure in person, it's indications, risks, potential complications and alternatives and any questions were answered. Verbal consent was obtained. Procedure: After routine sterile preparation, local anesthesia obtained by infiltration using 1% Lidocaine without epinephrine. A right 3-Lumen 7F Central Venous Catheter was placed by femoral vein approach and secured by standard fashion. Ultrasound Guidance:   used. Number of Attempts: 1  Post-procedure Findings: A post procedural chest x-ray  was not indicated. Patient tolerated the procedure well. Please note that the withdrawal or failure to initiate urgent interventions for this patient would likely result in a life threatening deterioration or permanent disability. Accordingly this patient received 30 minutes of critical care time, excluding separately billable procedures.            Dylan Louise DO  08/25/22 6945

## 2022-08-25 NOTE — PROGRESS NOTES
Patient continues to reach and pull ETT and critical lines. Educated on importance of ETT and lines, no evidence of learning. Bilateral soft wrist restraints started per order for patient safety.   Alli Padilla RN

## 2022-08-25 NOTE — CONSULTS
Surgical Intensive Care Unit  Consult Note  Date of admission:  8/24/2022  Reason for ICU transfer:  Cardiac tamponade s/p pericardiocentesis x2    Subjective:  51M with PMH GERD, ulcers, benign pituitary tumor, common iliac aneurysm, who presented to ED with chief complaint of abdominal pain that radiated toward his back. He was hypotensive on arrival and taken to CT, which revealed a large pericardial effusion. The ED physician performed pericardiocentesis at bedside with significant amount of fluid removed. Unfortunately, he became hypotensive again and ultrasound showed re-accumulation of fluid. He underwent a second pericardiocentesis and pressures stabilized. Patient endorses recent illness with shortness of breath and coughing along with fevers and chills for the past week. Otherwise, denies any recent dental work, urinary issues, or abdominal pain. Hospital Course:  8/25: Patient presented to ED with cardiac tamponade, s/p pericardiocentesis in ED. Initially hypotensive, but pressures improved. Started on Levo for further hemodynamic support and transferred to CVICU for further care. Physical Exam:  BP 80/61   Pulse 83   Temp 98.6 °F (37 °C) (Oral)   Resp 14   Ht 6' 3\" (1.905 m)   Wt 200 lb (90.7 kg)   SpO2 98%   BMI 25.00 kg/m²     GEN: somnolent, arousable, AAOx3  HEENT: NCAT  CV: regular rate and rhythm  RESP: normal respiratory effort on room air  ABD: soft, non-tender, non-distended, no rebound or guarding  EXT: warm and well perfused     ASSESSMENT / PLAN:  Neuro:  Prn pain control. CV: Pericardial tamponade s/p pericardiocentesis x2. CT surgery and cardiology consulted, will plan for echo and possible surgical intervention. Begin pressors to maintain MAP >65 mmHg. Monitor hemodynamics. Pulm: Recent shortness of breath and cough,  no acute issues currently. Monitor RR, SpO2. GI:  NPO + mIVF. Renal: No acute issues, trend Cr and UOP.   ID:  Pericardial fluid sent for multiple cultures, f/u results. Trend WBC, monitor fever curve. Endo: Hx benign pituitary tumor, resume home meds as appropriate. Monitor glucose. MSK: PT/OT when able. Bowel regimen: Glycolax  Pain control/Sedation: Tylenol   DVT prophylaxis: SCDs. Holding chemoppx for now. GI: NPO + mIVF  Glucose protocol:  None  Mouth/Eye care: As needed.     CVC sites:  R fem TLC, Day #1, L fem A-line Day #1  Ancillary consults: Cardiology, Cardiothoracic surgery  Family Update: As available     Code status:   Full Code  Family Update: As available     Electronically signed by Cachorro Carr MD on 8/25/22 at 4:53 AM EDT

## 2022-08-25 NOTE — PROGRESS NOTES
Dr. Kimberly Hernandez notified about current status and drips. New orders received and entered.  Freddy Shook RN
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
## 2022-08-25 NOTE — PROGRESS NOTES
08/25/22 1000   NICU Vent Information   $Ventilation $Initial Day   Ventilation Started Yes   Ventilation Day(s) 1   Skin Assessment Clean, dry, & intact   Ventilator -22   Equipment Changed HME   Vent Type 980   Vent Mode AC/VC   Vt (Set, mL) 500 mL   Vt (Measured) 512 mL   Resp Rate (Set) 12 bmp   Rate Measured 12 br/min   Minute Volume (L/min) 6.22 Liters   Peak Flow 60 L/min   Pressure Support 0 cmH20   FiO2  50 %   Peak Inspiratory Pressure (cmH2O) 24 cmH2O   I:E Ratio 1:4.60   Sensitivity 3   PEEP/CPAP (cmH2O) 8   Mean Airway Pressure (cmH2O) 11 cmH20   Plateau Pressure 16 RUU12   Static Compliance 59 mL/cmH2O   Total PEEP 8 cmH20   Auto PEEP 0 cmH20   Cough/Sputum   Sputum How Obtained None   Breath Sounds   Right Upper Lobe Rhonchi;Coarse Crackles   Right Lower Lobe Rhonchi;Coarse Crackles   Additional Respiratory Assessments   Heart Rate 88   SpO2 95 %   Position Supine   Humidification Source HME   Cuff Pressure (cm H2O) 29 cm H2O   Vent Alarm Settings   Low Minute Volume (lpm) 5 L/min   High Minute Volumn (lpm) 20 L/min   Low Exhaled Vt (ml) 350 mL   High Exhaled Vt (ml) 1200 mL   RR High (bpm) 40 br/min   RR Low (bpm) 12   Apnea (secs) 50 secs   ETT (adult)   Placement Date/Time: 08/25/22 0902   Present on Admission/Arrival: No  Placed By: In surgery; Licensed provider  Placement Verified By: Auscultation;Capnometry  Preoxygenation: Yes  Mask Ventilation: Mask ventilation not attempted (0)  Technique: Rapid. ..    Secured At 23 cm   Measured From Lips   ETT Placement Right   Secured By Commercial tube granado

## 2022-08-25 NOTE — ANESTHESIA POSTPROCEDURE EVALUATION
Department of Anesthesiology  Postprocedure Note    Patient: Ngozi Humphrey  MRN: [id number]  YOB: 1970  Date of evaluation: 8/25/2022      Procedure Summary     Date: 08/25/22 Room / Location: [facility] OR 02 / [facility]    Anesthesia Start: 4033 Anesthesia Stop: 1005    Procedure: SUBXIPHOID PERICARDIAL WINDOW CREATION (Chest) Diagnosis:       Cardiac tamponade      (Cardiac tamponade [I31.4])    Surgeons: Bennett Camp DO Responsible Provider: Jayden Vu DO    Anesthesia Type: general ASA Status: 4 - Emergent          Anesthesia Type: No value filed.     Cisco Phase I:      Cisco Phase II:        Anesthesia Post Evaluation    Patient location during evaluation: ICU  Patient participation: complete - patient cannot participate  Level of consciousness: sedated and ventilated  Airway patency: patent  Nausea & Vomiting: no nausea and no vomiting  Complications: no  Cardiovascular status: blood pressure returned to baseline  Respiratory status: acceptable  Hydration status: euvolemic  Multimodal analgesia pain management approach

## 2022-08-25 NOTE — OP NOTE
Operative Note      Patient: Olivier Powers  YOB: 1970  MRN: [id number]    Date of Procedure: 8/25/2022    Pre-Op Diagnosis: pericardial effusion with tamponade    Post-Op Diagnosis: Same       Procedure(s):  SUBXIPHOID PERICARDIAL WINDOW CREATION    Surgeon(s):  Fidel Mcdonald DO    Assistant:   Physician Assistant: SHARON Drummond    Anesthesia: General    Estimated Blood Loss (mL): less than 50     Complications: None    Specimens:   ID Type Source Tests Collected by Time Destination   1 : PERICARDIAL FLUID Tissue Heart CULTURE, ANAEROBIC, CULTURE, FUNGUS, GRAM STAIN, CULTURE, SURGICAL, CULTURE WITH SMEAR, ACID FAST BACILLIUS Enedina Alvinclifton DOWNEY, DO 8/25/2022 1672    A : PERICARDIUM Tissue Heart SURGICAL PATHOLOGY Fidel Mcdonald, DO 8/25/2022 0920        Implants:  * No implants in log *      Drains:   Chest Tube Pericardial 1 (Active)   Status Continuous Suction 08/25/22 0932   Suction -20 cm H2O 08/25/22 0932   Dressing Status New dressing applied;Clean, dry & intact 08/25/22 0932   Chest Tube Dressing Other (Comment) 08/25/22 0932       Findings: 52yo M presented with abdominal pain, found to have a large pericardial effusion. He underwent pericardiocentesis with temporary improvement in hemodynamics. Transthoracic echocardiogram demonstrated large pericardial effusion with tamponade. Decision was made to take him emergently to the operating room for drainage. Intraoperatively, he was found to have 400cc of cloudy, yellow pericardial effusion with fibrinous debris. There was minimal improvement in hemodynamics with complete drainage of the effusion. A 19Fr vito drain was left in the pericardial well. Detailed Description of Procedure:   After appropriate discussion of risks, benefits and alternatives, informed consent was obtained. Patient was brought to the operating room in stable condition. He was transferred to the operating table in the supine position.  His chest was prepped and draped in the normal sterile fashion. General anesthesia was provided by the anesthesia department and he was intubated. SCDs were on and functioning. Preoperative antibiotics were given. Time out was called for confirming appropriate patient, positioning and procedure. SIXTO was performed to visualize the pericardial effusion. See anesthesia note for details. A 5cm incision was created overlying the xiphoid. The subcutaneous tissue was divided using Bovie cautery. The xiphoid was circumferentially dissected and resected using sharp Metzenbaum scissors. The pericardium was grasped using Allis clamp and the pericardium was entered using sharp dissection. There was immediate release of cloudy, yellow pericardial fluid. It was under pressure. Fluid was taken for culture. Over 400cc of fluid was evacuated from the pericardial well. There was a moderate amount of fibrinous debris within the pericardial well, consistent with an acute on chronic process. There was minimal improvement in patient's hemodynamics with drainage of the effusion. A portion of pericardium was sent for pathology. Once the pericardial effusion was completely evacuated and confirmed with SIXTO. A 19Fr vito drain was placed into the pericardial well through a stab incision to the to the left of the xiphoid incision. It was secured in place. The incision was closed in multiple layers. Skin glue and a sterile bandage was placed over the incision. At the conclusion of the case, all needle, sponge and instrument counts were correct. The patient was transferred back to the ICU for further management.       Electronically signed by Richard Brand DO on 8/25/2022 at 9:46 AM
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
## 2022-08-25 NOTE — ED NOTES
Radiology Procedure Waiver   Name: Maricruz Mohamud  : 1970  MRN: [id number]    Date:  22    Time: 10:58 PM EDT    Benefits of immediately proceeding with Radiology exam(s) without pre-testing outweigh the risks or are not indicated as specified below and therefore the following is/are being waived:    [] Pregnancy test   [] Patients LMP on-time and regular.   [] Patient had Tubal Ligation or has other Contraception Device. [] Patient  is Menopausal or Premenarcheal.    [] Patient had Full or Partial Hysterectomy. [] Protocol for Iodine allergy    [] MRI Questionnaire     [x] BUN/Creatinine   [] Patient age w/no hx of renal dysfunction. [] Patient on Dialysis. [] Recent Normal Labs.   Electronically signed by Conor Werner MD on 22 at 10:58 PM EDT               Conor Werner MD  22 2052

## 2022-08-25 NOTE — PROGRESS NOTES
[facility]  SURGICAL INTENSIVE CARE UNIT    CRITICAL CARE ATTENDING PROGRESS NOTE    I have examined the patient, reviewed the record, and discussed the case with the APN/  Resident. I have reviewed all relevant labs and imaging data. Please refer to the  APN/ resident's note. I agree with the  assessment and plan with the following corrections/ additions. The following summarizes my clinical findings and independent assessment. CC: Cardiac tamponade    HOSPITAL COURSE:  44-year-old male presented to the ED with abdominal pain. He was hypotensive and a CT scan showed a large pericardial effusion. The ED physician perform pericardiocentesis. This morning patient underwent stat by CT surgery for a subxiphoid pericardial window. EXAM:  Intubated and sedated  Moves all extremities  Regular rate rhythm  Subxiphoid drain-serosanguineous  Abdomen soft nontender nondistended      ASSESSMENT:  Principal Problem:    Pericardial effusion with cardiac tamponade  Active Problems:    Cardiac tamponade  Resolved Problems:    * No resolved hospital problems. *       PLAN:  Sedation/ Pain: Continue propofol drip for RASS 0 to -1    CV: Large pericardial effusion, cardiac tamponade-status post pericardial window with subxiphoid drain by CT surgery on August 25-continue drain    Etiology unclear-we will send respiratory panel and respiratory cultures, COVID test, check thyroid function tests  Patient states that he was sick earlier    Shock secondary to tamponade physiology-currently on vasopressin and Levophed drip. We will stop vasopressin. Titrate for MAP greater than 65. Check random cortisol    Pulmonary: Acute respiratory failure-continue full vent support    GI: NPO    FEN: Acute renal injury-improving with IV fluid hydration  Replace magnesium    Heme: Monitor CBC    Endo: Monitor Blood Sugars. Target blood glucose less than 180 in the ICU.       DVT prophylaxis--SCDS, Lovenox  GI Prophylaxis--Protonix  Lines--right femoral triple-lumen catheter, femoral arterial line-8/24  CODE: FULL     DISPOSITION-Continue ICU Care    Critical care time exclusive of teaching and procedures = 35 min     I provided critical care to a patient with cardiac tamponade requiring subxiphoid pericardial window requiring frequent and emergent imaging, lab studies, intensive monitoring, data review, and adjusting the clinical plan as well as urgent coordination with multiple specialists. Pt needs continuous ICU monitoring because the patient is at risk for deterioration from a hemodynamic standpoint. Melba De Dios MD, FACS  8/25/2022  12:03 PM    NOTE: This report was transcribed using voice recognition software. Every effort was made to ensure accuracy; however, inadvertent computerized transcription errors may be present.

## 2022-08-25 NOTE — PROGRESS NOTES
Dr. Andra Iverson and anesthesia at bedside. Plan for OR. Consent signed by patient. Declined phone call to family to update.  Nikita Benton RN

## 2022-08-25 NOTE — PLAN OF CARE
Problem: Respiratory - Adult  Goal: Achieves optimal ventilation and oxygenation  8/25/2022 1642 by Patricia Figueroa RCP  Flowsheets (Taken 8/25/2022 0909 by Kirstie Dailey RN)  Achieves optimal ventilation and oxygenation:   Assess for changes in respiratory status   Position to facilitate oxygenation and minimize respiratory effort  8/25/2022 0909 by Kirstie Dailey RN  Outcome: Progressing  Flowsheets (Taken 8/25/2022 0909)  Achieves optimal ventilation and oxygenation:   Assess for changes in respiratory status   Position to facilitate oxygenation and minimize respiratory effort  8/25/2022 0544 by Leonel Howe RN  Outcome: Progressing

## 2022-08-25 NOTE — PROGRESS NOTES
Pt admitted for pericardial tamponade s/p pericardiocentesis x 2 complicated by shock requiring pressors. CTS and cardiology consulted and plan is for echo and possible surgical intervention. Wean pressors as vitals allow. Wean oxygen as tolerated.
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
## 2022-08-25 NOTE — BRIEF OP NOTE
Brief Postoperative Note    Frank Velasquez  YOB: 1970  [id number]    Pre-operative Diagnosis: pericardial effusion with tamponade    Post-operative Diagnosis: Same    Operation: subxiphoid pericardial window    Anesthesia: General    Surgeon: Patra Shone    Assistants: Joon Candelario    Estimated Blood Loss: less than 50     Complications: none apparent    Implants: none

## 2022-08-25 NOTE — PLAN OF CARE
Problem: Pain  Goal: Verbalizes/displays adequate comfort level or baseline comfort level  Outcome: Progressing     Problem: Safety - Adult  Goal: Free from fall injury  Outcome: Progressing     Problem: Respiratory - Adult  Goal: Achieves optimal ventilation and oxygenation  Outcome: Progressing     Problem: Cardiovascular - Adult  Goal: Maintains optimal cardiac output and hemodynamic stability  Outcome: Progressing  Goal: Absence of cardiac dysrhythmias or at baseline  Outcome: Progressing     Problem: Infection - Adult  Goal: Absence of infection at discharge  Outcome: Progressing  Goal: Absence of infection during hospitalization  Outcome: Progressing  Goal: Absence of fever/infection during anticipated neutropenic period  Outcome: Progressing     Problem: Metabolic/Fluid and Electrolytes - Adult  Goal: Electrolytes maintained within normal limits  Outcome: Progressing

## 2022-08-25 NOTE — PLAN OF CARE
Problem: Safety - Medical Restraint  Goal: Remains free of injury from restraints (Restraint for Interference with Medical Device)  Description: INTERVENTIONS:  1. Determine that other, less restrictive measures have been tried or would not be effective before applying the restraint  2. Evaluate the patient's condition at the time of restraint application  3. Inform patient/family regarding the reason for restraint  4.  Q2H: Monitor safety, psychosocial status, comfort, nutrition and hydration  Outcome: Progressing  Flowsheets  Taken 8/25/2022 1047  Remains free of injury from restraints (restraint for interference with medical device):   Every 2 hours: Monitor safety, psychosocial status, comfort, nutrition and hydration   Evaluate the patient's condition at the time of restraint application   Determine that other, less restrictive measures have been tried or would not be effective before applying the restraint  Taken 8/25/2022 1015  Remains free of injury from restraints (restraint for interference with medical device):   Determine that other, less restrictive measures have been tried or would not be effective before applying the restraint   Evaluate the patient's condition at the time of restraint application   Every 2 hours: Monitor safety, psychosocial status, comfort, nutrition and hydration
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
## 2022-08-25 NOTE — PROGRESS NOTES
08/25/22 1053   Treatment   Treatment Type HHN   $Treatment Type $Inhaled Therapy/Meds   Medications Albuterol/Ipratropium   Pre-Tx Pulse 85   Breath Sounds Pre-Tx ANDRAE Rhonchi;Crackles   Breath Sounds Pre-Tx LLL Crackles   Breath Sounds Pre-Tx RUL Rhonchi;Crackles   Breath Sounds Pre-Tx RML Crackles   Breath Sounds Pre-Tx RLL Crackles   Breath Sounds Post-Tx ANDRAE Rhonchi;Crackles   Breath Sounds Post-Tx LLL Crackles   Breath Sounds Post-Tx RUL Rhonchi;Crackles   Breath Sounds Post-Tx RML Crackles   Breath Sounds Post-Tx RLL Crackles   Post-Tx Pulse 85   Post-Tx Resps 12   Delivery Source Oxygen   Position Semi-Schofield's   Treatment Tolerance Well   Duration 8   Is patient on O2?  Y   Oxygen Therapy/Pulse Ox   O2 Therapy Oxygen   O2 Device Ventilator   FiO2  50 %   Heart Rate 85   Resp 15   SpO2 96 %   Skin Assessment Clean, dry, & intact   Ventilator ID 22   Humidification Source HME   Cough/Sputum   Sputum How Obtained Endotracheal;Suctioned   Cough Productive   Frequency Infrequent   Sputum Amount Small   Sputum Color Clear   Tenacity Thin   RT Misc Charges   $RT Education $HHN/MDI/IPPB Demo or Eval

## 2022-08-25 NOTE — PROGRESS NOTES
08/25/22 1053   NICU Vent Information   Skin Assessment Clean, dry, & intact   Ventilator ID 22   Vent Type 980   Vent Mode AC/VC   Vt (Set, mL) 500 mL   Vt (Measured) 544 mL   Resp Rate (Set) 12 bmp   Rate Measured 15 br/min   Minute Volume (L/min) 7.9 Liters   Peak Flow 60 L/min   Pressure Support 0 cmH20   FiO2  50 %   Peak Inspiratory Pressure (cmH2O) 22 cmH2O   I:E Ratio 1:3.20   Sensitivity 3   PEEP/CPAP (cmH2O) 8   Mean Airway Pressure (cmH2O) 11 cmH20   Cough/Sputum   Sputum How Obtained Endotracheal;Suctioned   Cough Productive   Frequency Infrequent   Sputum Amount Small   Sputum Color Clear   Tenacity Thin   Breath Sounds   Right Upper Lobe Rhonchi;Coarse Crackles   Right Middle Lobe Equal   Right Lower Lobe Rhonchi;Coarse Crackles   Left Upper Lobe Equal   Left Lower Lobe Equal   Additional Respiratory Assessments   Heart Rate 85   Resp 15   SpO2 96 %   Position Semi-Schofield's   Humidification Source HME   Cuff Pressure (cm H2O) 29 cm H2O   Vent Alarm Settings   Low Minute Volume (lpm) 5 L/min   High Minute Volumn (lpm) 20 L/min   Low Exhaled Vt (ml) 350 mL   High Exhaled Vt (ml) 1200 mL   RR High (bpm) 40 br/min   RR Low (bpm) 12   Apnea (secs) 20 secs   ETT (adult)   Placement Date/Time: 08/25/22 0902   Present on Admission/Arrival: No  Placed By: In surgery; Licensed provider  Placement Verified By: Auscultation;Capnometry  Preoxygenation: Yes  Mask Ventilation: Mask ventilation not attempted (0)  Technique: Rapid. ..    Secured At 23 cm   Measured From Lips   ETT Placement Right   Secured By Commercial tube granado
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
## 2022-08-25 NOTE — ANESTHESIA PROCEDURE NOTES
Procedure Performed: SIXTO       Start Time:        End Time:      Preanesthesia Checklist:  Patient identified, IV assessed, risks and benefits discussed, monitors and equipment assessed, procedure being performed at surgeon's request and anesthesia consent obtained. General Procedure Information  Diagnostic Indications for Echo:  assessment of surgical repair, hemodynamic monitoring and assessment of valve function  Location performed:  OR  Intubated  Bite block placed  Heart visualized  Probe Insertion:  Easy  Probe Type:  3D and mulitplane  Modalities:  3D, color flow mapping, pulse wave Doppler and continuous wave Doppler    Echocardiographic and Doppler Measurements    Ventricles    Right Ventricle:  Cavity size normal.  Hypertrophy not present. Thrombus not present. Global function normal.    Left Ventricle:  Cavity size normal.  Hypertrophy not present. Thrombus not present. Global Function normal.      Ventricular Regional Function:  1- Basal Anteroseptal:  normal  2- Basal Anterior:  normal  3- Basal Anterolateral:  normal  4- Basal Inferolateral:  normal  5- Basal Inferior:  normal  6- Basal Inferoseptal:  normal  7- Mid Anteroseptal:  normal  8- Mid Anterior:  normal  9- Mid Anterolateral:  normal  10- Mid Inferolateral:  normal  11- Mid Inferior:  normal  12- Mid Inferoseptal:  normal  13- Apical Anterior:  normal  14- Apical Lateral:  normal  15- Apical Inferior:  normal  16- Apical Septal:  normal  17- [place]:  normal      Valves    Aortic Valve: Annulus normal.  Stenosis not present. Regurgitation mild. Leaflets normal.  Leaflet motions normal.      Mitral Valve: Annulus normal.  Stenosis not present. Regurgitation none. Leaflets normal.  Leaflet motions normal.      Tricuspid Valve: Annulus normal.  Stenosis not present. Regurgitation none. Leaflets normal.  Leaflet motions normal.    Pulmonic Valve: Annulus normal.  Stenosis not present. Regurgitation none.         Aorta    Ascending Aorta: Size normal.  Dissection not present. Aortic Arch:  Size normal.  Dissection not present. Descending Aorta:  Size normal.  Dissection not present. Other Aortic Findings:       Rick Ezra grade 1 atheroma throughout the aorta    Atria    Right Atrium:  Size normal.  Spontaneous echo contrast not present. Thrombus not present. Tumor not present. Device not present. Left Atrium:  Size normal.  Spontaneous echo contrast not present. Thrombus not present. Tumor not present. Device not present. Left atrial appendage normal.      Septa    Atrial Septum:  Intra-atrial septal morphology normal.      Ventricular Septum:  Intra-ventricular septum morphology normal.          Other Findings  Pericardium:  pericardial effusion  Pleural Effusion:  none  Pulmonary Arteries:  normal  Pulmonary Venous Flow:  normal    Anesthesia Information  Performed Personally  Anesthesiologist:  Sarah Alamo DO      Echocardiogram Comments:        All findings d/w surgeonPost Intervention Follow-up Study  Comments: S/p pericardial window with complete resolution of effusion

## 2022-08-26 ENCOUNTER — APPOINTMENT (OUTPATIENT)
Dept: GENERAL RADIOLOGY | Age: 52
DRG: 270 | End: 2022-08-26
Payer: MEDICARE

## 2022-08-26 PROBLEM — E27.49 SECONDARY ADRENAL INSUFFICIENCY (HCC): Status: ACTIVE | Noted: 2022-08-26

## 2022-08-26 PROBLEM — E03.8 CENTRAL HYPOTHYROIDISM: Status: ACTIVE | Noted: 2022-08-26

## 2022-08-26 LAB
AADO2: 110.1 MMHG
AADO2: 156.7 MMHG
AFB SMEAR: NORMAL
ALBUMIN SERPL-MCNC: 2.9 G/DL (ref 3.5–5.2)
ALBUMIN SERPL-MCNC: 3.5 G/DL (ref 3.5–5.2)
ALP BLD-CCNC: 37 U/L (ref 40–129)
ALP BLD-CCNC: 39 U/L (ref 40–129)
ALT SERPL-CCNC: 7 U/L (ref 0–40)
ALT SERPL-CCNC: 8 U/L (ref 0–40)
ANION GAP SERPL CALCULATED.3IONS-SCNC: 11 MMOL/L (ref 7–16)
ANION GAP SERPL CALCULATED.3IONS-SCNC: 12 MMOL/L (ref 7–16)
AST SERPL-CCNC: 19 U/L (ref 0–39)
AST SERPL-CCNC: 21 U/L (ref 0–39)
B.E.: -6 MMOL/L (ref -3–3)
B.E.: -6.1 MMOL/L (ref -3–3)
B.E.: -7.7 MMOL/L (ref -3–3)
B.E.: -9.5 MMOL/L (ref -3–3)
BASOPHILS ABSOLUTE: 0.01 E9/L (ref 0–0.2)
BASOPHILS RELATIVE PERCENT: 0.1 % (ref 0–2)
BILIRUB SERPL-MCNC: 0.2 MG/DL (ref 0–1.2)
BILIRUB SERPL-MCNC: 0.2 MG/DL (ref 0–1.2)
BUN BLDV-MCNC: 10 MG/DL (ref 6–20)
BUN BLDV-MCNC: 11 MG/DL (ref 6–20)
BUN BLDV-MCNC: 12 MG/DL (ref 6–20)
BUN BLDV-MCNC: 9 MG/DL (ref 6–20)
CALCIUM IONIZED: 1.16 MMOL/L (ref 1.15–1.33)
CALCIUM IONIZED: 1.2 MMOL/L (ref 1.15–1.33)
CALCIUM IONIZED: 1.24 MMOL/L (ref 1.15–1.33)
CALCIUM SERPL-MCNC: 7.7 MG/DL (ref 8.6–10.2)
CALCIUM SERPL-MCNC: 7.8 MG/DL (ref 8.6–10.2)
CALCIUM SERPL-MCNC: 7.9 MG/DL (ref 8.6–10.2)
CALCIUM SERPL-MCNC: 8 MG/DL (ref 8.6–10.2)
CALCIUM SERPL-MCNC: 8.1 MG/DL (ref 8.6–10.2)
CALCIUM SERPL-MCNC: 8.1 MG/DL (ref 8.6–10.2)
CHLORIDE BLD-SCNC: 100 MMOL/L (ref 98–107)
CHLORIDE BLD-SCNC: 100 MMOL/L (ref 98–107)
CHLORIDE BLD-SCNC: 102 MMOL/L (ref 98–107)
CHLORIDE BLD-SCNC: 104 MMOL/L (ref 98–107)
CHLORIDE BLD-SCNC: 104 MMOL/L (ref 98–107)
CHLORIDE BLD-SCNC: 105 MMOL/L (ref 98–107)
CO2: 10 MMOL/L (ref 22–29)
CO2: 11 MMOL/L (ref 22–29)
CO2: 13 MMOL/L (ref 22–29)
CO2: 13 MMOL/L (ref 22–29)
CO2: 14 MMOL/L (ref 22–29)
CO2: 17 MMOL/L (ref 22–29)
COHB: 0.1 % (ref 0–1.5)
COHB: 0.3 % (ref 0–1.5)
CREAT SERPL-MCNC: 1.2 MG/DL (ref 0.7–1.2)
CREAT SERPL-MCNC: 1.3 MG/DL (ref 0.7–1.2)
CREAT SERPL-MCNC: 1.3 MG/DL (ref 0.7–1.2)
CRITICAL: ABNORMAL
DATE ANALYZED: ABNORMAL
DATE OF COLLECTION: ABNORMAL
EKG ATRIAL RATE: 63 BPM
EKG Q-T INTERVAL: 476 MS
EKG QRS DURATION: 116 MS
EKG QTC CALCULATION (BAZETT): 535 MS
EKG R AXIS: -91 DEGREES
EKG T AXIS: 62 DEGREES
EKG VENTRICULAR RATE: 76 BPM
EOSINOPHILS ABSOLUTE: 0 E9/L (ref 0.05–0.5)
EOSINOPHILS RELATIVE PERCENT: 0 % (ref 0–6)
FIO2: 40 %
FIO2: 40 %
GFR AFRICAN AMERICAN: >60
GFR NON-AFRICAN AMERICAN: >60 ML/MIN/1.73
GLUCOSE BLD-MCNC: 112 MG/DL (ref 74–99)
GLUCOSE BLD-MCNC: 164 MG/DL (ref 74–99)
GLUCOSE BLD-MCNC: 182 MG/DL (ref 74–99)
GLUCOSE BLD-MCNC: 184 MG/DL (ref 74–99)
GLUCOSE BLD-MCNC: 184 MG/DL (ref 74–99)
GLUCOSE BLD-MCNC: 226 MG/DL (ref 74–99)
GRAM STAIN ORDERABLE: NORMAL
HBA1C MFR BLD: 5.6 % (ref 4–5.6)
HCO3: 14.2 MMOL/L (ref 22–26)
HCO3: 16.2 MMOL/L (ref 22–26)
HCO3: 16.3 MMOL/L (ref 22–26)
HCO3: 16.4 MMOL/L (ref 22–26)
HCT VFR BLD CALC: 33 % (ref 37–54)
HCT VFR BLD CALC: 37.3 % (ref 37–54)
HEMOGLOBIN: 11.5 G/DL (ref 12.5–16.5)
HEMOGLOBIN: 12.8 G/DL (ref 12.5–16.5)
HHB: 1.7 % (ref 0–5)
HHB: 3.5 % (ref 0–5)
HHB: 6.5 % (ref 0–5)
HHB: 7.1 % (ref 0–5)
IMMATURE GRANULOCYTES #: 0.09 E9/L
IMMATURE GRANULOCYTES %: 0.6 % (ref 0–5)
LAB: ABNORMAL
LACTIC ACID: 2.5 MMOL/L (ref 0.5–2.2)
LACTIC ACID: 2.5 MMOL/L (ref 0.5–2.2)
LACTIC ACID: 3.4 MMOL/L (ref 0.5–2.2)
LACTIC ACID: 3.5 MMOL/L (ref 0.5–2.2)
LV EF: 64 %
LVEF MODALITY: NORMAL
LYMPHOCYTES ABSOLUTE: 1.56 E9/L (ref 1.5–4)
LYMPHOCYTES RELATIVE PERCENT: 10.7 % (ref 20–42)
Lab: ABNORMAL
MAGNESIUM: 2.3 MG/DL (ref 1.6–2.6)
MAGNESIUM: 2.6 MG/DL (ref 1.6–2.6)
MAGNESIUM: 2.7 MG/DL (ref 1.6–2.6)
MCH RBC QN AUTO: 29.3 PG (ref 26–35)
MCH RBC QN AUTO: 30.2 PG (ref 26–35)
MCHC RBC AUTO-ENTMCNC: 34.3 % (ref 32–34.5)
MCHC RBC AUTO-ENTMCNC: 34.8 % (ref 32–34.5)
MCV RBC AUTO: 85.4 FL (ref 80–99.9)
MCV RBC AUTO: 86.6 FL (ref 80–99.9)
METER GLUCOSE: 133 MG/DL (ref 74–99)
METER GLUCOSE: 140 MG/DL (ref 74–99)
METER GLUCOSE: 164 MG/DL (ref 74–99)
METER GLUCOSE: 167 MG/DL (ref 74–99)
METER GLUCOSE: 176 MG/DL (ref 74–99)
METER GLUCOSE: 203 MG/DL (ref 74–99)
METHB: 0.2 % (ref 0–1.5)
METHB: 0.3 % (ref 0–1.5)
METHB: 0.3 % (ref 0–1.5)
METHB: 0.4 % (ref 0–1.5)
MODE: ABNORMAL
MODE: ABNORMAL
MODE: AC
MODE: AC
MONOCYTES ABSOLUTE: 0.66 E9/L (ref 0.1–0.95)
MONOCYTES RELATIVE PERCENT: 4.5 % (ref 2–12)
NEUTROPHILS ABSOLUTE: 12.32 E9/L (ref 1.8–7.3)
NEUTROPHILS RELATIVE PERCENT: 84.1 % (ref 43–80)
O2 SATURATION: 92.9 % (ref 92–98.5)
O2 SATURATION: 94.3 % (ref 92–98.5)
O2 SATURATION: 96.5 % (ref 92–98.5)
O2 SATURATION: 98.3 % (ref 92–98.5)
O2HB: 92.3 % (ref 94–97)
O2HB: 92.8 % (ref 94–97)
O2HB: 96 % (ref 94–97)
O2HB: 97.9 % (ref 94–97)
OPERATOR ID: 359
OPERATOR ID: 421
OPERATOR ID: 7490
OPERATOR ID: ABNORMAL
ORGANISM: ABNORMAL
PATIENT TEMP: 37 C
PCO2: 23.1 MMHG (ref 35–45)
PCO2: 24.4 MMHG (ref 35–45)
PCO2: 25.4 MMHG (ref 35–45)
PCO2: 29 MMHG (ref 35–45)
PDW BLD-RTO: 15.5 FL (ref 11.5–15)
PDW BLD-RTO: 15.7 FL (ref 11.5–15)
PEEP/CPAP: 8 CMH2O
PEEP/CPAP: 8 CMH2O
PFO2: 2.23 MMHG/%
PFO2: 3.3 MMHG/%
PH BLOOD GAS: 7.37 (ref 7.35–7.45)
PH BLOOD GAS: 7.37 (ref 7.35–7.45)
PH BLOOD GAS: 7.45 (ref 7.35–7.45)
PH BLOOD GAS: 7.46 (ref 7.35–7.45)
PHOSPHORUS: 2.2 MG/DL (ref 2.5–4.5)
PHOSPHORUS: 2.4 MG/DL (ref 2.5–4.5)
PLATELET # BLD: 171 E9/L (ref 130–450)
PLATELET # BLD: 215 E9/L (ref 130–450)
PMV BLD AUTO: 10.3 FL (ref 7–12)
PMV BLD AUTO: 10.6 FL (ref 7–12)
PO2: 131.8 MMHG (ref 75–100)
PO2: 61.1 MMHG (ref 75–100)
PO2: 64.9 MMHG (ref 75–100)
PO2: 89.3 MMHG (ref 75–100)
POTASSIUM SERPL-SCNC: 4.1 MMOL/L (ref 3.5–5)
POTASSIUM SERPL-SCNC: 4.4 MMOL/L (ref 3.5–5)
POTASSIUM SERPL-SCNC: 4.7 MMOL/L (ref 3.5–5)
POTASSIUM SERPL-SCNC: 5.7 MMOL/L (ref 3.5–5)
POTASSIUM SERPL-SCNC: 5.98 MMOL/L (ref 3.5–5)
POTASSIUM SERPL-SCNC: 7.3 MMOL/L (ref 3.5–5)
POTASSIUM SERPL-SCNC: 7.5 MMOL/L (ref 3.5–5)
PRO-BNP: 3290 PG/ML (ref 0–125)
PROCALCITONIN: 8.13 NG/ML (ref 0–0.08)
RBC # BLD: 3.81 E12/L (ref 3.8–5.8)
RBC # BLD: 4.37 E12/L (ref 3.8–5.8)
RI(T): 0.84
RI(T): 1.75
RR MECHANICAL: 12 B/MIN
RR MECHANICAL: 12 B/MIN
SODIUM BLD-SCNC: 121 MMOL/L (ref 132–146)
SODIUM BLD-SCNC: 122 MMOL/L (ref 132–146)
SODIUM BLD-SCNC: 127 MMOL/L (ref 132–146)
SODIUM BLD-SCNC: 129 MMOL/L (ref 132–146)
SODIUM BLD-SCNC: 129 MMOL/L (ref 132–146)
SODIUM BLD-SCNC: 132 MMOL/L (ref 132–146)
SOURCE, BLOOD GAS: ABNORMAL
THB: 11.2 G/DL (ref 11.5–16.5)
THB: 11.7 G/DL (ref 11.5–16.5)
THB: 12.1 G/DL (ref 11.5–16.5)
THB: 12.7 G/DL (ref 11.5–16.5)
TIME ANALYZED: 1150
TIME ANALYZED: 1843
TIME ANALYZED: 343
TIME ANALYZED: 628
TOTAL PROTEIN: 5.4 G/DL (ref 6.4–8.3)
TOTAL PROTEIN: 6.2 G/DL (ref 6.4–8.3)
TROPONIN, HIGH SENSITIVITY: 10 NG/L (ref 0–11)
TSH SERPL DL<=0.05 MIU/L-ACNC: 0.04 UIU/ML (ref 0.27–4.2)
VT MECHANICAL: 500 ML
VT MECHANICAL: 500 ML
WBC # BLD: 14.6 E9/L (ref 4.5–11.5)
WBC # BLD: 16.6 E9/L (ref 4.5–11.5)

## 2022-08-26 PROCEDURE — 82330 ASSAY OF CALCIUM: CPT

## 2022-08-26 PROCEDURE — 2580000003 HC RX 258: Performed by: SURGERY

## 2022-08-26 PROCEDURE — 6370000000 HC RX 637 (ALT 250 FOR IP): Performed by: INTERNAL MEDICINE

## 2022-08-26 PROCEDURE — 6360000002 HC RX W HCPCS

## 2022-08-26 PROCEDURE — 84145 PROCALCITONIN (PCT): CPT

## 2022-08-26 PROCEDURE — 84484 ASSAY OF TROPONIN QUANT: CPT

## 2022-08-26 PROCEDURE — 2500000003 HC RX 250 WO HCPCS: Performed by: INTERNAL MEDICINE

## 2022-08-26 PROCEDURE — 2580000003 HC RX 258: Performed by: STUDENT IN AN ORGANIZED HEALTH CARE EDUCATION/TRAINING PROGRAM

## 2022-08-26 PROCEDURE — 99291 CRITICAL CARE FIRST HOUR: CPT | Performed by: SURGERY

## 2022-08-26 PROCEDURE — 2580000003 HC RX 258: Performed by: NURSE PRACTITIONER

## 2022-08-26 PROCEDURE — 84443 ASSAY THYROID STIM HORMONE: CPT

## 2022-08-26 PROCEDURE — 82962 GLUCOSE BLOOD TEST: CPT

## 2022-08-26 PROCEDURE — 83735 ASSAY OF MAGNESIUM: CPT

## 2022-08-26 PROCEDURE — 82805 BLOOD GASES W/O2 SATURATION: CPT

## 2022-08-26 PROCEDURE — 80048 BASIC METABOLIC PNL TOTAL CA: CPT

## 2022-08-26 PROCEDURE — 93005 ELECTROCARDIOGRAM TRACING: CPT | Performed by: STUDENT IN AN ORGANIZED HEALTH CARE EDUCATION/TRAINING PROGRAM

## 2022-08-26 PROCEDURE — 36415 COLL VENOUS BLD VENIPUNCTURE: CPT

## 2022-08-26 PROCEDURE — 6360000004 HC RX CONTRAST MEDICATION: Performed by: STUDENT IN AN ORGANIZED HEALTH CARE EDUCATION/TRAINING PROGRAM

## 2022-08-26 PROCEDURE — 83036 HEMOGLOBIN GLYCOSYLATED A1C: CPT

## 2022-08-26 PROCEDURE — 6360000002 HC RX W HCPCS: Performed by: SURGERY

## 2022-08-26 PROCEDURE — 6370000000 HC RX 637 (ALT 250 FOR IP): Performed by: STUDENT IN AN ORGANIZED HEALTH CARE EDUCATION/TRAINING PROGRAM

## 2022-08-26 PROCEDURE — 2500000003 HC RX 250 WO HCPCS: Performed by: NURSE PRACTITIONER

## 2022-08-26 PROCEDURE — 2500000003 HC RX 250 WO HCPCS: Performed by: STUDENT IN AN ORGANIZED HEALTH CARE EDUCATION/TRAINING PROGRAM

## 2022-08-26 PROCEDURE — 84100 ASSAY OF PHOSPHORUS: CPT

## 2022-08-26 PROCEDURE — 84132 ASSAY OF SERUM POTASSIUM: CPT

## 2022-08-26 PROCEDURE — 85025 COMPLETE CBC W/AUTO DIFF WBC: CPT

## 2022-08-26 PROCEDURE — 71045 X-RAY EXAM CHEST 1 VIEW: CPT

## 2022-08-26 PROCEDURE — 94003 VENT MGMT INPAT SUBQ DAY: CPT

## 2022-08-26 PROCEDURE — 6370000000 HC RX 637 (ALT 250 FOR IP): Performed by: NURSE PRACTITIONER

## 2022-08-26 PROCEDURE — 80053 COMPREHEN METABOLIC PANEL: CPT

## 2022-08-26 PROCEDURE — 6360000002 HC RX W HCPCS: Performed by: STUDENT IN AN ORGANIZED HEALTH CARE EDUCATION/TRAINING PROGRAM

## 2022-08-26 PROCEDURE — 2700000000 HC OXYGEN THERAPY PER DAY

## 2022-08-26 PROCEDURE — 99222 1ST HOSP IP/OBS MODERATE 55: CPT | Performed by: INTERNAL MEDICINE

## 2022-08-26 PROCEDURE — 94640 AIRWAY INHALATION TREATMENT: CPT

## 2022-08-26 PROCEDURE — 6370000000 HC RX 637 (ALT 250 FOR IP)

## 2022-08-26 PROCEDURE — 2580000003 HC RX 258: Performed by: INTERNAL MEDICINE

## 2022-08-26 PROCEDURE — 83880 ASSAY OF NATRIURETIC PEPTIDE: CPT

## 2022-08-26 PROCEDURE — 85027 COMPLETE CBC AUTOMATED: CPT

## 2022-08-26 PROCEDURE — 6360000002 HC RX W HCPCS: Performed by: NURSE PRACTITIONER

## 2022-08-26 PROCEDURE — 93308 TTE F-UP OR LMTD: CPT

## 2022-08-26 PROCEDURE — 2000000000 HC ICU R&B

## 2022-08-26 PROCEDURE — 83605 ASSAY OF LACTIC ACID: CPT

## 2022-08-26 PROCEDURE — 37799 UNLISTED PX VASCULAR SURGERY: CPT

## 2022-08-26 PROCEDURE — 94799 UNLISTED PULMONARY SVC/PX: CPT

## 2022-08-26 RX ORDER — MAGNESIUM SULFATE IN WATER 40 MG/ML
2000 INJECTION, SOLUTION INTRAVENOUS ONCE
Status: COMPLETED | OUTPATIENT
Start: 2022-08-26 | End: 2022-08-26

## 2022-08-26 RX ORDER — MAGNESIUM SULFATE IN WATER 40 MG/ML
INJECTION, SOLUTION INTRAVENOUS
Status: COMPLETED
Start: 2022-08-26 | End: 2022-08-26

## 2022-08-26 RX ORDER — ONDANSETRON 4 MG/1
4 TABLET, ORALLY DISINTEGRATING ORAL EVERY 8 HOURS PRN
Status: DISCONTINUED | OUTPATIENT
Start: 2022-08-26 | End: 2022-08-27

## 2022-08-26 RX ORDER — SODIUM CHLORIDE, SODIUM LACTATE, POTASSIUM CHLORIDE, AND CALCIUM CHLORIDE .6; .31; .03; .02 G/100ML; G/100ML; G/100ML; G/100ML
250 INJECTION, SOLUTION INTRAVENOUS ONCE
Status: COMPLETED | OUTPATIENT
Start: 2022-08-26 | End: 2022-08-26

## 2022-08-26 RX ORDER — ACETAMINOPHEN 325 MG/1
650 TABLET ORAL EVERY 6 HOURS PRN
Status: DISCONTINUED | OUTPATIENT
Start: 2022-08-26 | End: 2022-08-30 | Stop reason: HOSPADM

## 2022-08-26 RX ORDER — HYDROCORTISONE 20 MG/1
20 TABLET ORAL 2 TIMES DAILY
Status: DISCONTINUED | OUTPATIENT
Start: 2022-08-26 | End: 2022-08-27

## 2022-08-26 RX ORDER — DESMOPRESSIN ACETATE 0.1 MG/1
300 TABLET ORAL NIGHTLY
Status: DISCONTINUED | OUTPATIENT
Start: 2022-08-26 | End: 2022-08-27

## 2022-08-26 RX ORDER — SODIUM CHLORIDE, SODIUM LACTATE, POTASSIUM CHLORIDE, CALCIUM CHLORIDE 600; 310; 30; 20 MG/100ML; MG/100ML; MG/100ML; MG/100ML
INJECTION, SOLUTION INTRAVENOUS CONTINUOUS
Status: DISCONTINUED | OUTPATIENT
Start: 2022-08-26 | End: 2022-08-27

## 2022-08-26 RX ORDER — SODIUM CHLORIDE 0.9 % (FLUSH) 0.9 %
5-40 SYRINGE (ML) INJECTION EVERY 12 HOURS SCHEDULED
Status: DISCONTINUED | OUTPATIENT
Start: 2022-08-26 | End: 2022-08-30 | Stop reason: HOSPADM

## 2022-08-26 RX ORDER — POLYETHYLENE GLYCOL 3350 17 G/17G
17 POWDER, FOR SOLUTION ORAL DAILY PRN
Status: DISCONTINUED | OUTPATIENT
Start: 2022-08-26 | End: 2022-08-28

## 2022-08-26 RX ORDER — LEVOTHYROXINE SODIUM ANHYDROUS 100 UG/5ML
100 INJECTION, POWDER, LYOPHILIZED, FOR SOLUTION INTRAVENOUS ONCE
Status: COMPLETED | OUTPATIENT
Start: 2022-08-26 | End: 2022-08-26

## 2022-08-26 RX ORDER — SODIUM CHLORIDE 0.9 % (FLUSH) 0.9 %
5-40 SYRINGE (ML) INJECTION PRN
Status: DISCONTINUED | OUTPATIENT
Start: 2022-08-26 | End: 2022-08-30 | Stop reason: HOSPADM

## 2022-08-26 RX ORDER — LEVOTHYROXINE SODIUM ANHYDROUS 100 UG/5ML
125 INJECTION, POWDER, LYOPHILIZED, FOR SOLUTION INTRAVENOUS DAILY
Status: DISCONTINUED | OUTPATIENT
Start: 2022-08-27 | End: 2022-08-29

## 2022-08-26 RX ORDER — SODIUM CHLORIDE 9 MG/ML
INJECTION, SOLUTION INTRAVENOUS PRN
Status: DISCONTINUED | OUTPATIENT
Start: 2022-08-26 | End: 2022-08-30 | Stop reason: HOSPADM

## 2022-08-26 RX ORDER — SODIUM POLYSTYRENE SULFONATE 15 G/60ML
15 SUSPENSION ORAL; RECTAL ONCE
Status: DISCONTINUED | OUTPATIENT
Start: 2022-08-26 | End: 2022-08-29

## 2022-08-26 RX ORDER — DEXTROSE MONOHYDRATE 100 MG/ML
INJECTION, SOLUTION INTRAVENOUS CONTINUOUS PRN
Status: DISCONTINUED | OUTPATIENT
Start: 2022-08-26 | End: 2022-08-26 | Stop reason: SDUPTHER

## 2022-08-26 RX ORDER — ALBUTEROL SULFATE 2.5 MG/3ML
10 SOLUTION RESPIRATORY (INHALATION) ONCE
Status: COMPLETED | OUTPATIENT
Start: 2022-08-26 | End: 2022-08-26

## 2022-08-26 RX ORDER — DEXTROSE MONOHYDRATE 100 MG/ML
INJECTION, SOLUTION INTRAVENOUS CONTINUOUS PRN
Status: DISCONTINUED | OUTPATIENT
Start: 2022-08-26 | End: 2022-08-29

## 2022-08-26 RX ORDER — INSULIN LISPRO 100 [IU]/ML
0-4 INJECTION, SOLUTION INTRAVENOUS; SUBCUTANEOUS
Status: DISCONTINUED | OUTPATIENT
Start: 2022-08-26 | End: 2022-08-30 | Stop reason: HOSPADM

## 2022-08-26 RX ORDER — ALBUTEROL SULFATE 1.25 MG/3ML
10 SOLUTION RESPIRATORY (INHALATION) ONCE
Status: DISCONTINUED | OUTPATIENT
Start: 2022-08-26 | End: 2022-08-26

## 2022-08-26 RX ORDER — CALCIUM GLUCONATE 94 MG/ML
1000 INJECTION, SOLUTION INTRAVENOUS ONCE
Status: DISCONTINUED | OUTPATIENT
Start: 2022-08-26 | End: 2022-08-30 | Stop reason: HOSPADM

## 2022-08-26 RX ORDER — CALCIUM GLUCONATE 94 MG/ML
2000 INJECTION, SOLUTION INTRAVENOUS ONCE
Status: COMPLETED | OUTPATIENT
Start: 2022-08-26 | End: 2022-08-26

## 2022-08-26 RX ORDER — ONDANSETRON 2 MG/ML
4 INJECTION INTRAMUSCULAR; INTRAVENOUS EVERY 6 HOURS PRN
Status: DISCONTINUED | OUTPATIENT
Start: 2022-08-26 | End: 2022-08-27

## 2022-08-26 RX ORDER — DEXTROSE MONOHYDRATE 100 MG/ML
INJECTION, SOLUTION INTRAVENOUS CONTINUOUS PRN
Status: DISCONTINUED | OUTPATIENT
Start: 2022-08-26 | End: 2022-08-30 | Stop reason: HOSPADM

## 2022-08-26 RX ORDER — DESMOPRESSIN ACETATE 0.1 MG/1
200 TABLET ORAL DAILY
Status: DISCONTINUED | OUTPATIENT
Start: 2022-08-26 | End: 2022-08-27

## 2022-08-26 RX ORDER — DEXTROSE MONOHYDRATE 25 G/50ML
25 INJECTION, SOLUTION INTRAVENOUS ONCE
Status: COMPLETED | OUTPATIENT
Start: 2022-08-26 | End: 2022-08-26

## 2022-08-26 RX ORDER — ACETAMINOPHEN 650 MG/1
650 SUPPOSITORY RECTAL EVERY 6 HOURS PRN
Status: DISCONTINUED | OUTPATIENT
Start: 2022-08-26 | End: 2022-08-30 | Stop reason: HOSPADM

## 2022-08-26 RX ORDER — INSULIN LISPRO 100 [IU]/ML
0-4 INJECTION, SOLUTION INTRAVENOUS; SUBCUTANEOUS NIGHTLY
Status: DISCONTINUED | OUTPATIENT
Start: 2022-08-26 | End: 2022-08-30 | Stop reason: HOSPADM

## 2022-08-26 RX ADMIN — Medication 30 MCG/MIN: at 04:17

## 2022-08-26 RX ADMIN — DEXTROSE MONOHYDRATE 250 ML: 100 INJECTION, SOLUTION INTRAVENOUS at 03:25

## 2022-08-26 RX ADMIN — CALCIUM GLUCONATE 2000 MG: 98 INJECTION, SOLUTION INTRAVENOUS at 03:20

## 2022-08-26 RX ADMIN — INSULIN HUMAN 10 UNITS: 100 INJECTION, SOLUTION PARENTERAL at 03:15

## 2022-08-26 RX ADMIN — DEXTROSE MONOHYDRATE 250 ML: 100 INJECTION, SOLUTION INTRAVENOUS at 03:29

## 2022-08-26 RX ADMIN — SODIUM CHLORIDE, PRESERVATIVE FREE 10 ML: 5 INJECTION INTRAVENOUS at 20:53

## 2022-08-26 RX ADMIN — PROPOFOL 40 MCG/KG/MIN: 10 INJECTION, EMULSION INTRAVENOUS at 04:18

## 2022-08-26 RX ADMIN — INSULIN HUMAN 10 UNITS: 100 INJECTION, SOLUTION PARENTERAL at 20:52

## 2022-08-26 RX ADMIN — HYDROCORTISONE SODIUM SUCCINATE 50 MG: 100 INJECTION, POWDER, FOR SOLUTION INTRAMUSCULAR; INTRAVENOUS at 23:40

## 2022-08-26 RX ADMIN — Medication 17 MCG/MIN: at 16:04

## 2022-08-26 RX ADMIN — CEFAZOLIN 2000 MG: 2 INJECTION, POWDER, FOR SOLUTION INTRAMUSCULAR; INTRAVENOUS at 08:30

## 2022-08-26 RX ADMIN — MAGNESIUM SULFATE HEPTAHYDRATE 2000 MG: 40 INJECTION, SOLUTION INTRAVENOUS at 03:00

## 2022-08-26 RX ADMIN — SODIUM CHLORIDE, PRESERVATIVE FREE 10 ML: 5 INJECTION INTRAVENOUS at 23:36

## 2022-08-26 RX ADMIN — PANTOPRAZOLE SODIUM 40 MG: 40 TABLET, DELAYED RELEASE ORAL at 15:55

## 2022-08-26 RX ADMIN — IPRATROPIUM BROMIDE AND ALBUTEROL SULFATE 1 AMPULE: 2.5; .5 SOLUTION RESPIRATORY (INHALATION) at 12:33

## 2022-08-26 RX ADMIN — ALBUTEROL SULFATE 10 MG: 2.5 SOLUTION RESPIRATORY (INHALATION) at 03:34

## 2022-08-26 RX ADMIN — PROPOFOL 40 MCG/KG/MIN: 10 INJECTION, EMULSION INTRAVENOUS at 00:49

## 2022-08-26 RX ADMIN — HYDROCORTISONE SODIUM SUCCINATE 50 MG: 100 INJECTION, POWDER, FOR SOLUTION INTRAMUSCULAR; INTRAVENOUS at 18:29

## 2022-08-26 RX ADMIN — SODIUM CHLORIDE 3000 MG: 900 INJECTION INTRAVENOUS at 18:33

## 2022-08-26 RX ADMIN — SENNOSIDES AND DOCUSATE SODIUM 1 TABLET: 50; 8.6 TABLET ORAL at 08:29

## 2022-08-26 RX ADMIN — IPRATROPIUM BROMIDE AND ALBUTEROL SULFATE 1 AMPULE: 2.5; .5 SOLUTION RESPIRATORY (INHALATION) at 16:17

## 2022-08-26 RX ADMIN — SODIUM CHLORIDE, PRESERVATIVE FREE 10 ML: 5 INJECTION INTRAVENOUS at 08:50

## 2022-08-26 RX ADMIN — LEVOTHYROXINE SODIUM ANHYDROUS 100 MCG: 100 INJECTION, POWDER, LYOPHILIZED, FOR SOLUTION INTRAVENOUS at 12:59

## 2022-08-26 RX ADMIN — PANTOPRAZOLE SODIUM 40 MG: 40 TABLET, DELAYED RELEASE ORAL at 08:29

## 2022-08-26 RX ADMIN — SODIUM CHLORIDE, POTASSIUM CHLORIDE, SODIUM LACTATE AND CALCIUM CHLORIDE 250 ML: 600; 310; 30; 20 INJECTION, SOLUTION INTRAVENOUS at 23:29

## 2022-08-26 RX ADMIN — HYDROCORTISONE SODIUM SUCCINATE 50 MG: 100 INJECTION, POWDER, FOR SOLUTION INTRAMUSCULAR; INTRAVENOUS at 11:26

## 2022-08-26 RX ADMIN — SODIUM CHLORIDE, POTASSIUM CHLORIDE, SODIUM LACTATE AND CALCIUM CHLORIDE: 600; 310; 30; 20 INJECTION, SOLUTION INTRAVENOUS at 23:29

## 2022-08-26 RX ADMIN — SODIUM CHLORIDE 3000 MG: 900 INJECTION INTRAVENOUS at 23:44

## 2022-08-26 RX ADMIN — SODIUM PHOSPHATE, MONOBASIC, MONOHYDRATE 20 MMOL: 276; 142 INJECTION, SOLUTION INTRAVENOUS at 04:19

## 2022-08-26 RX ADMIN — IPRATROPIUM BROMIDE AND ALBUTEROL SULFATE 1 AMPULE: 2.5; .5 SOLUTION RESPIRATORY (INHALATION) at 18:58

## 2022-08-26 RX ADMIN — MAGNESIUM SULFATE IN WATER 2000 MG: 40 INJECTION, SOLUTION INTRAVENOUS at 03:00

## 2022-08-26 RX ADMIN — ACETAMINOPHEN 650 MG: 325 TABLET, FILM COATED ORAL at 05:04

## 2022-08-26 RX ADMIN — IPRATROPIUM BROMIDE AND ALBUTEROL SULFATE 1 AMPULE: 2.5; .5 SOLUTION RESPIRATORY (INHALATION) at 08:58

## 2022-08-26 RX ADMIN — INSULIN HUMAN 10 UNITS: 100 INJECTION, SOLUTION PARENTERAL at 03:19

## 2022-08-26 RX ADMIN — SODIUM CHLORIDE: 9 INJECTION, SOLUTION INTRAVENOUS at 04:08

## 2022-08-26 RX ADMIN — PERFLUTREN 1.65 MG: 6.52 INJECTION, SUSPENSION INTRAVENOUS at 12:17

## 2022-08-26 RX ADMIN — DEXTROSE MONOHYDRATE 25 G: 25 INJECTION, SOLUTION INTRAVENOUS at 20:53

## 2022-08-26 RX ADMIN — DESMOPRESSIN ACETATE 200 MCG: 0.1 TABLET ORAL at 08:29

## 2022-08-26 RX ADMIN — SODIUM BICARBONATE 50 MEQ: 84 INJECTION INTRAVENOUS at 03:34

## 2022-08-26 RX ADMIN — SODIUM CHLORIDE 3000 MG: 900 INJECTION INTRAVENOUS at 11:25

## 2022-08-26 RX ADMIN — CEFAZOLIN 2000 MG: 2 INJECTION, POWDER, FOR SOLUTION INTRAMUSCULAR; INTRAVENOUS at 01:05

## 2022-08-26 RX ADMIN — LEVOTHYROXINE SODIUM 137 MCG: 0.14 TABLET ORAL at 06:41

## 2022-08-26 RX ADMIN — ACETAMINOPHEN 650 MG: 325 TABLET, FILM COATED ORAL at 21:59

## 2022-08-26 RX ADMIN — ENOXAPARIN SODIUM 40 MG: 100 INJECTION SUBCUTANEOUS at 08:30

## 2022-08-26 RX ADMIN — HYDROCORTISONE SODIUM SUCCINATE 50 MG: 100 INJECTION, POWDER, FOR SOLUTION INTRAMUSCULAR; INTRAVENOUS at 05:04

## 2022-08-26 ASSESSMENT — PAIN SCALES - GENERAL
PAINLEVEL_OUTOF10: 0
PAINLEVEL_OUTOF10: 2
PAINLEVEL_OUTOF10: 0

## 2022-08-26 ASSESSMENT — PULMONARY FUNCTION TESTS
PIF_VALUE: 18
PIF_VALUE: 17
PIF_VALUE: 20
PIF_VALUE: 17
PIF_VALUE: 17
PIF_VALUE: 21
PIF_VALUE: 16
PIF_VALUE: 17
PIF_VALUE: 17
PIF_VALUE: 26
PIF_VALUE: 20
PIF_VALUE: 17
PIF_VALUE: 17
PIF_VALUE: 19
PIF_VALUE: 14
PIF_VALUE: 19
PIF_VALUE: 19
PIF_VALUE: 18
PIF_VALUE: 22
PIF_VALUE: 17
PIF_VALUE: 17
PIF_VALUE: 16
PIF_VALUE: 18

## 2022-08-26 NOTE — CONSULTS
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 acidosis   - Solu-Cortef  50 mg IV q6hr              Cardiac Tamponade, s/p two pericardiocentesis in the ED  and subxiphoid pericardial window 8/25/2022  - Pericardial chest tube (day 2), total out 180 cc     Hx of Common iliac aneurysm    Pulmonary    Acute respiratory insufficinecy  - Currently on high flow nasal cannula 15L/min  - BiPAP at night   - Patient Was extubated 8/26/2022 AM     Respiratory cx grows Rare Gram positive cocci in pairs  - On Unasyn    GI    Hx of GERD    Renal    Hyponatremia - resolving  - Na currently 132  - Continue holding DDVAP      Hyperkalemia - resolving  - K currently 4.1   - Given insulin 10 Units and IV D50      Lactic acidosis unknown etiology  - LA currently 3.4  - Procalcitonin elevated at 8.13  - WBC trending down   - Replaced NS boluses with LR  - 250 cc LR Bolus given  - Pancultures sent   - Continue Unasyn 3g IV q 6 hours       Endocrine    Panhypopituitarism  - Patient developed panhypot following TSS 10 years ago for pituitary adenoma      Central Hypothyroidism  - IV Levothyroxine 125 mcg  - TSH 0.036    Type 2 DM  - On low dose sliding scale     Secondary adrenal insufficiency  - Low cortisol levels  - Solu-Cortef  50 mg IV q6hr            Hypogonadotropic hypogonadism  - Will hold testosterone while inpatient      Infection    Concerns for underlying infection  - Follow Pancultures  - Continue Unasyn 3g IV q 6 hours     MRSA Nares positive  - Bactroban ointment      Heme/Onc    Leukocytosis - improving  - Monitor CBC       PT/OT evaluation: Not indicated at this moment   DVT prophylaxis/ GI prophylaxis: Lovenox 40 mg Sc / Protonix 40 mg BID   Disposition: continue current care     Hossein Hamlin MD, PGY-1   Attending physician: Dr. Rachael Mercedes       I personally saw, examined and provided care for the patient. Radiographs, labs and medication list were reviewed by me independently. I spoke with bedside nursing, therapists and consultants. Critical care services and times documented are independent of procedures and multidisciplinary rounds with Residents. Additionally comprehensive, multidisciplinary rounds were conducted with the MICU team. The case was discussed in detail and plans for care were established. Review of Residents documentation was conducted and revisions were made as appropriate. I agree with the above documented exam, problem list and plan of care.    Oh Mercado MD   CCT excluding procedures :39'

## 2022-08-26 NOTE — PLAN OF CARE
Problem: Respiratory - Adult  Goal: Achieves optimal ventilation and oxygenation  8/25/2022 2200 by Alicia Contreras, JENI  Outcome: Progressing  Flowsheets (Taken 8/25/2022 2200)  Achieves optimal ventilation and oxygenation:   Assess for changes in respiratory status   Position to facilitate oxygenation and minimize respiratory effort   Oxygen supplementation based on oxygen saturation or arterial blood gases   Assess the need for suctioning and aspirate as needed   Respiratory therapy support as indicated
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
## 2022-08-26 NOTE — PROGRESS NOTES
[facility]  INTENSIVE CARE UNIT    CRITICAL CARE ATTENDING PROGRESS NOTE    I have examined the patient, reviewed the record, and discussed the case with the APN/  Resident. I have reviewed all relevant labs and imaging data. Please refer to the  APN/ resident's note. I agree with the  assessment and plan with the following corrections/ additions. The following summarizes my clinical findings and independent assessment. CC: Cardiac tamponade    HOSPITAL COURSE:  59-year-old male presented to the ED with abdominal pain. He was hypotensive and a CT scan showed a large pericardial effusion. The ED physician perform pericardiocentesis. On August 25, patient underwent stat by CT surgery for a subxiphoid pericardial window. August 26-patient remains on 2 pressors. Cortisol, TSH T4 all low    EXAM:  Intubated and sedated  Moves all extremities  Regular rate rhythm  Subxiphoid drain-serosanguineous  Abdomen soft nontender nondistended      ASSESSMENT:  Principal Problem:    Pericardial effusion with cardiac tamponade  Active Problems:    Cardiac tamponade  Resolved Problems:    * No resolved hospital problems. *       PLAN:  Sedation/ Pain: Continue propofol drip for RASS 0 to -1    CV: Large pericardial effusion, cardiac tamponade-status post pericardial window with subxiphoid drain by CT surgery on August 25-continue drain    Etiology pericardial effusion unclear. Respiratory panel was negative. Respiratory cultures growing gram-positive cocci. We will start Unasyn 3 g IV every 6    Shock secondary to tamponade physiology-currently on vasopressin and Levophed drip. Titrate for MAP greater than 65. Pulmonary: Acute respiratory failure-continue full vent support    GI: NPO    FEN: Acute renal injury-improving with IV fluid hydration       Heme: Monitor CBC    Endo: Monitor Blood Sugars. Target blood glucose less than 180 in the ICU.     Cortisol 2.0-start hydrocortisone 50 mg IV

## 2022-08-26 NOTE — CARE COORDINATION
Pod #1 s/p pericardial window. Ct in place. Intubated on vent. Propofol stopped this am. Remains on levo and vaso drips. Spoke with pt's sister, Maryjane Barry in room. Pt lives with her in a 1 story home. He is independent in adl's. He does not own any dme. Pcp is Dr Leticia Bang and preferred pharmacy is [facility] on [facility]. Dc plan to be determined once pt is able to be extubated. Plan at [facility] time is to return home. Loli Jewell can be reached at [phone].

## 2022-08-26 NOTE — PROGRESS NOTES
Patient was extubated to 3 liters/min via nasal cannula. Stridor was not present post extubation. SPO2 was 98%. Patient was suctioned pre and post extubation. Cuff deflated prior to extubation.        Performed by  Isaura Proctor RCP
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
## 2022-08-26 NOTE — PLAN OF CARE
Problem: Safety - Adult  Goal: Free from fall injury  Outcome: Progressing     Problem: Respiratory - Adult  Goal: Achieves optimal ventilation and oxygenation  8/26/2022 0656 by Rupert Duckworth  Outcome: Progressing  8/25/2022 2200 by Jolie Jeronimo. ANDREI Contreras  Outcome: Progressing  Flowsheets (Taken 8/25/2022 2200)  Achieves optimal ventilation and oxygenation:   Assess for changes in respiratory status   Position to facilitate oxygenation and minimize respiratory effort   Oxygen supplementation based on oxygen saturation or arterial blood gases   Assess the need for suctioning and aspirate as needed   Respiratory therapy support as indicated     Problem: Cardiovascular - Adult  Goal: Maintains optimal cardiac output and hemodynamic stability  Outcome: Progressing     Problem: Skin/Tissue Integrity  Goal: Absence of new skin breakdown  Description: 1. Monitor for areas of redness and/or skin breakdown  2. Assess vascular access sites hourly  3. Every 4-6 hours minimum:  Change oxygen saturation probe site  4. Every 4-6 hours:  If on nasal continuous positive airway pressure, respiratory therapy assess nares and determine need for appliance change or resting period. Outcome: Progressing     Problem: Safety - Medical Restraint  Goal: Remains free of injury from restraints (Restraint for Interference with Medical Device)  Description: INTERVENTIONS:  1. Determine that other, less restrictive measures have been tried or would not be effective before applying the restraint  2. Evaluate the patient's condition at the time of restraint application  3. Inform patient/family regarding the reason for restraint  4.  Q2H: Monitor safety, psychosocial status, comfort, nutrition and hydration  Outcome: Progressing

## 2022-08-26 NOTE — PROGRESS NOTES
Physical Therapy  Physical Therapy Attempt    Name: Horacio Landeros  : 1970  MRN: [id number]      Date of Service: 2022  Chart reviewed. Spoke with CTS - will hold initial evaluation at this time. Will re-attempt as able.     Radha Alfonso, PT, DPT  [id number]

## 2022-08-26 NOTE — PROGRESS NOTES
Spontaneous Parameters performed    VT = 542 ml  f = 25  B/M  Ve = 9.35 L/M  NIF = -25  cmH2O  VC = 1.2 L  RSBI = 25      Performed by Juanjose Bingham RCP

## 2022-08-26 NOTE — FLOWSHEET NOTE
0800 Attempts to pull lines when restraints are off,unable to redirect. Bilateral soft wrist restraints continued for pt. safety.

## 2022-08-26 NOTE — PROGRESS NOTES
Physician Progress Note      Carmen Ramos  CSN #:                  [id number]  :                       1970  ADMIT DATE:       2022 10:44 PM  [address] [place] DATE:  RESPONDING  PROVIDER #:        Debbie Aguayo MD          QUERY TEXT:    Pt admitted with large pericardial effusion with cardiac tamponade s/p   pericardiocentesis/pericardial window. Noted documentation of shock 2/2   cardiac tamponade. If possible, please document in progress notes and   discharge summary further specificity regarding the type of shock: The medical record reflects the following:  Risk Factors: pericardial effusion with cardiac tamponade  Clinical Indicators: SBP 50's-90's, MAPs 44-63, HR 90-94; shortness of breath   and chest pain. Treatment: Levophed gtt, Vasopressin gtt, emergent pericardiocentesis,   pericardial window, CT chest, SIXTO, CV consult, ICU monitoring, vent    Thank you, Fidel Jiang RN BSN CDS  [phone]  Options provided:  -- Cardiogenic Shock  -- Other - I will add my own diagnosis  -- Disagree - Not applicable / Not valid  -- Disagree - Clinically unable to determine / Unknown  -- Refer to Clinical Documentation Reviewer    PROVIDER RESPONSE TEXT:    This patient is in cardiogenic shock.     Query created by: Dav Jones on 2022 2:42 PM      Electronically signed by:  Debbie Aguayo MD 2022 9:02 AM

## 2022-08-26 NOTE — PROGRESS NOTES
Pt C/O ongoing, worsening SOB. Pt tachypneic, with decreased breath sounds noted. O2 increased to 6L via NC. Providers informed. Repeat XRAY to be obtained, ABG obtained. SPO2 90-93% at this time. VS otherwise stable. This RN @ bedside.

## 2022-08-27 ENCOUNTER — APPOINTMENT (OUTPATIENT)
Dept: GENERAL RADIOLOGY | Age: 52
DRG: 270 | End: 2022-08-27
Payer: MEDICARE

## 2022-08-27 LAB
ALBUMIN SERPL-MCNC: 3.3 G/DL (ref 3.5–5.2)
ALP BLD-CCNC: 42 U/L (ref 40–129)
ALT SERPL-CCNC: <5 U/L (ref 0–40)
ANION GAP SERPL CALCULATED.3IONS-SCNC: 11 MMOL/L (ref 7–16)
AST SERPL-CCNC: 16 U/L (ref 0–39)
BASOPHILS ABSOLUTE: 0.01 E9/L (ref 0–0.2)
BASOPHILS RELATIVE PERCENT: 0.1 % (ref 0–2)
BILIRUB SERPL-MCNC: 0.6 MG/DL (ref 0–1.2)
BODY FLUID CULTURE, STERILE: NORMAL
BODY FLUID CULTURE, STERILE: NORMAL
BUN BLDV-MCNC: 8 MG/DL (ref 6–20)
CALCIUM SERPL-MCNC: 8.5 MG/DL (ref 8.6–10.2)
CHLORIDE BLD-SCNC: 106 MMOL/L (ref 98–107)
CO2: 18 MMOL/L (ref 22–29)
CREAT SERPL-MCNC: 1.2 MG/DL (ref 0.7–1.2)
CULTURE, RESPIRATORY: NORMAL
EOSINOPHILS ABSOLUTE: 0 E9/L (ref 0.05–0.5)
EOSINOPHILS RELATIVE PERCENT: 0 % (ref 0–6)
GFR AFRICAN AMERICAN: >60
GFR NON-AFRICAN AMERICAN: >60 ML/MIN/1.73
GLUCOSE BLD-MCNC: 117 MG/DL (ref 74–99)
GRAM STAIN RESULT: NORMAL
GRAM STAIN RESULT: NORMAL
HCT VFR BLD CALC: 23 % (ref 37–54)
HCT VFR BLD CALC: 24.5 % (ref 37–54)
HEMOGLOBIN: 8.2 G/DL (ref 12.5–16.5)
HEMOGLOBIN: 8.9 G/DL (ref 12.5–16.5)
IMMATURE GRANULOCYTES #: 0.24 E9/L
IMMATURE GRANULOCYTES %: 1.3 % (ref 0–5)
LACTIC ACID: 1.3 MMOL/L (ref 0.5–2.2)
LYMPHOCYTES ABSOLUTE: 1.01 E9/L (ref 1.5–4)
LYMPHOCYTES RELATIVE PERCENT: 5.4 % (ref 20–42)
MAGNESIUM: 2.2 MG/DL (ref 1.6–2.6)
MCH RBC QN AUTO: 29.4 PG (ref 26–35)
MCHC RBC AUTO-ENTMCNC: 36.3 % (ref 32–34.5)
MCV RBC AUTO: 80.9 FL (ref 80–99.9)
METER GLUCOSE: 110 MG/DL (ref 74–99)
METER GLUCOSE: 121 MG/DL (ref 74–99)
METER GLUCOSE: 124 MG/DL (ref 74–99)
METER GLUCOSE: 127 MG/DL (ref 74–99)
METER GLUCOSE: 141 MG/DL (ref 74–99)
MONOCYTES ABSOLUTE: 0.85 E9/L (ref 0.1–0.95)
MONOCYTES RELATIVE PERCENT: 4.6 % (ref 2–12)
NEUTROPHILS ABSOLUTE: 16.46 E9/L (ref 1.8–7.3)
NEUTROPHILS RELATIVE PERCENT: 88.6 % (ref 43–80)
PDW BLD-RTO: 15.3 FL (ref 11.5–15)
PHOSPHORUS: 2.2 MG/DL (ref 2.5–4.5)
PLATELET # BLD: 147 E9/L (ref 130–450)
PMV BLD AUTO: 10.1 FL (ref 7–12)
POTASSIUM SERPL-SCNC: 4 MMOL/L (ref 3.5–5)
RBC # BLD: 3.03 E12/L (ref 3.8–5.8)
SMEAR, RESPIRATORY: NORMAL
SODIUM BLD-SCNC: 135 MMOL/L (ref 132–146)
T4 FREE: 0.31 NG/DL (ref 0.93–1.7)
TOTAL PROTEIN: 5.9 G/DL (ref 6.4–8.3)
WBC # BLD: 18.6 E9/L (ref 4.5–11.5)

## 2022-08-27 PROCEDURE — 6370000000 HC RX 637 (ALT 250 FOR IP): Performed by: INTERNAL MEDICINE

## 2022-08-27 PROCEDURE — 85018 HEMOGLOBIN: CPT

## 2022-08-27 PROCEDURE — 80053 COMPREHEN METABOLIC PANEL: CPT

## 2022-08-27 PROCEDURE — 84439 ASSAY OF FREE THYROXINE: CPT

## 2022-08-27 PROCEDURE — 6360000002 HC RX W HCPCS: Performed by: SURGERY

## 2022-08-27 PROCEDURE — 85014 HEMATOCRIT: CPT

## 2022-08-27 PROCEDURE — 2700000000 HC OXYGEN THERAPY PER DAY

## 2022-08-27 PROCEDURE — 83735 ASSAY OF MAGNESIUM: CPT

## 2022-08-27 PROCEDURE — 6370000000 HC RX 637 (ALT 250 FOR IP): Performed by: NURSE PRACTITIONER

## 2022-08-27 PROCEDURE — 2500000003 HC RX 250 WO HCPCS: Performed by: INTERNAL MEDICINE

## 2022-08-27 PROCEDURE — 2580000003 HC RX 258: Performed by: SURGERY

## 2022-08-27 PROCEDURE — 36415 COLL VENOUS BLD VENIPUNCTURE: CPT

## 2022-08-27 PROCEDURE — 85025 COMPLETE CBC W/AUTO DIFF WBC: CPT

## 2022-08-27 PROCEDURE — 83605 ASSAY OF LACTIC ACID: CPT

## 2022-08-27 PROCEDURE — 2500000003 HC RX 250 WO HCPCS: Performed by: STUDENT IN AN ORGANIZED HEALTH CARE EDUCATION/TRAINING PROGRAM

## 2022-08-27 PROCEDURE — 84100 ASSAY OF PHOSPHORUS: CPT

## 2022-08-27 PROCEDURE — 99232 SBSQ HOSP IP/OBS MODERATE 35: CPT | Performed by: INTERNAL MEDICINE

## 2022-08-27 PROCEDURE — 2580000003 HC RX 258: Performed by: INTERNAL MEDICINE

## 2022-08-27 PROCEDURE — 94640 AIRWAY INHALATION TREATMENT: CPT

## 2022-08-27 PROCEDURE — 2000000000 HC ICU R&B

## 2022-08-27 PROCEDURE — 2580000003 HC RX 258: Performed by: NURSE PRACTITIONER

## 2022-08-27 PROCEDURE — 6360000002 HC RX W HCPCS: Performed by: INTERNAL MEDICINE

## 2022-08-27 PROCEDURE — 71045 X-RAY EXAM CHEST 1 VIEW: CPT

## 2022-08-27 PROCEDURE — 82962 GLUCOSE BLOOD TEST: CPT

## 2022-08-27 RX ORDER — DESMOPRESSIN ACETATE 0.1 MG/1
150 TABLET ORAL 2 TIMES DAILY
Status: DISCONTINUED | OUTPATIENT
Start: 2022-08-27 | End: 2022-08-30 | Stop reason: HOSPADM

## 2022-08-27 RX ORDER — DESMOPRESSIN ACETATE 4 UG/ML
1 INJECTION, SOLUTION INTRAVENOUS; SUBCUTANEOUS ONCE
Status: COMPLETED | OUTPATIENT
Start: 2022-08-27 | End: 2022-08-27

## 2022-08-27 RX ORDER — SODIUM CHLORIDE, SODIUM LACTATE, POTASSIUM CHLORIDE, AND CALCIUM CHLORIDE .6; .31; .03; .02 G/100ML; G/100ML; G/100ML; G/100ML
500 INJECTION, SOLUTION INTRAVENOUS ONCE
Status: COMPLETED | OUTPATIENT
Start: 2022-08-27 | End: 2022-08-27

## 2022-08-27 RX ADMIN — PANTOPRAZOLE SODIUM 40 MG: 40 TABLET, DELAYED RELEASE ORAL at 16:30

## 2022-08-27 RX ADMIN — SENNOSIDES AND DOCUSATE SODIUM 1 TABLET: 50; 8.6 TABLET ORAL at 08:23

## 2022-08-27 RX ADMIN — ACETAMINOPHEN 650 MG: 325 TABLET, FILM COATED ORAL at 05:16

## 2022-08-27 RX ADMIN — HYDROCORTISONE SODIUM SUCCINATE 50 MG: 100 INJECTION, POWDER, FOR SOLUTION INTRAMUSCULAR; INTRAVENOUS at 16:30

## 2022-08-27 RX ADMIN — MUPIROCIN: 20 OINTMENT TOPICAL at 01:48

## 2022-08-27 RX ADMIN — SODIUM PHOSPHATE, MONOBASIC, MONOHYDRATE 15 MMOL: 276; 142 INJECTION, SOLUTION INTRAVENOUS at 08:30

## 2022-08-27 RX ADMIN — SODIUM CHLORIDE 3000 MG: 900 INJECTION INTRAVENOUS at 18:34

## 2022-08-27 RX ADMIN — SODIUM CHLORIDE, PRESERVATIVE FREE 10 ML: 5 INJECTION INTRAVENOUS at 22:05

## 2022-08-27 RX ADMIN — MUPIROCIN: 20 OINTMENT TOPICAL at 08:25

## 2022-08-27 RX ADMIN — DESMOPRESSIN ACETATE 150 MCG: 0.1 TABLET ORAL at 10:18

## 2022-08-27 RX ADMIN — SODIUM CHLORIDE, POTASSIUM CHLORIDE, SODIUM LACTATE AND CALCIUM CHLORIDE 500 ML: 600; 310; 30; 20 INJECTION, SOLUTION INTRAVENOUS at 05:19

## 2022-08-27 RX ADMIN — LEVOTHYROXINE SODIUM ANHYDROUS 125 MCG: 100 INJECTION, POWDER, LYOPHILIZED, FOR SOLUTION INTRAVENOUS at 08:22

## 2022-08-27 RX ADMIN — SODIUM CHLORIDE 3000 MG: 900 INJECTION INTRAVENOUS at 23:22

## 2022-08-27 RX ADMIN — IPRATROPIUM BROMIDE AND ALBUTEROL SULFATE 1 AMPULE: 2.5; .5 SOLUTION RESPIRATORY (INHALATION) at 19:30

## 2022-08-27 RX ADMIN — IPRATROPIUM BROMIDE AND ALBUTEROL SULFATE 1 AMPULE: 2.5; .5 SOLUTION RESPIRATORY (INHALATION) at 08:35

## 2022-08-27 RX ADMIN — IPRATROPIUM BROMIDE AND ALBUTEROL SULFATE 1 AMPULE: 2.5; .5 SOLUTION RESPIRATORY (INHALATION) at 12:44

## 2022-08-27 RX ADMIN — MUPIROCIN: 20 OINTMENT TOPICAL at 22:02

## 2022-08-27 RX ADMIN — IPRATROPIUM BROMIDE AND ALBUTEROL SULFATE 1 AMPULE: 2.5; .5 SOLUTION RESPIRATORY (INHALATION) at 15:48

## 2022-08-27 RX ADMIN — HYDROCORTISONE SODIUM SUCCINATE 50 MG: 100 INJECTION, POWDER, FOR SOLUTION INTRAMUSCULAR; INTRAVENOUS at 05:39

## 2022-08-27 RX ADMIN — SODIUM CHLORIDE 3000 MG: 900 INJECTION INTRAVENOUS at 12:16

## 2022-08-27 RX ADMIN — ACETAMINOPHEN 650 MG: 325 TABLET, FILM COATED ORAL at 22:02

## 2022-08-27 RX ADMIN — SENNOSIDES AND DOCUSATE SODIUM 1 TABLET: 50; 8.6 TABLET ORAL at 22:03

## 2022-08-27 RX ADMIN — ACETAMINOPHEN 650 MG: 325 TABLET, FILM COATED ORAL at 10:20

## 2022-08-27 RX ADMIN — DESMOPRESSIN ACETATE 150 MCG: 0.1 TABLET ORAL at 22:01

## 2022-08-27 RX ADMIN — ACETAMINOPHEN 650 MG: 325 TABLET, FILM COATED ORAL at 16:27

## 2022-08-27 RX ADMIN — ENOXAPARIN SODIUM 40 MG: 100 INJECTION SUBCUTANEOUS at 08:22

## 2022-08-27 RX ADMIN — DESMOPRESSIN ACETATE 1 MCG: 4 SOLUTION INTRAVENOUS at 05:40

## 2022-08-27 RX ADMIN — SODIUM CHLORIDE 3000 MG: 900 INJECTION INTRAVENOUS at 05:45

## 2022-08-27 RX ADMIN — SODIUM CHLORIDE, PRESERVATIVE FREE 10 ML: 5 INJECTION INTRAVENOUS at 22:01

## 2022-08-27 RX ADMIN — PANTOPRAZOLE SODIUM 40 MG: 40 TABLET, DELAYED RELEASE ORAL at 08:22

## 2022-08-27 ASSESSMENT — PAIN SCALES - GENERAL: PAINLEVEL_OUTOF10: 0

## 2022-08-27 NOTE — PROGRESS NOTES
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 old male with PMH listed below admitted to [facility] on 8/24/2022 because of abdominal pain, endocrine service was consulted for refractory shock in the setting of known pan-hypopituitarism.      Subjective   Seen and examined , no acute events feels better, Na normal       Scheduled Meds:   desmopressin  150 mcg Oral BID    hydrocortisone sodium succinate PF  50 mg IntraVENous Q12H    calcium gluconate  1,000 mg IntraVENous Once    sodium polystyrene  15 g Oral Once    ampicillin-sulbactam  3,000 mg IntraVENous Q6H    levothyroxine  125 mcg IntraVENous Daily    insulin lispro  0-4 Units SubCUTAneous TID WC    insulin lispro  0-4 Units SubCUTAneous Nightly    sodium chloride flush  5-40 mL IntraVENous 2 times per day    mupirocin   Topical BID    pantoprazole  40 mg Oral BID    sodium chloride flush  5-40 mL IntraVENous 2 times per day    acetaminophen  650 mg Oral Q6H    sennosides-docusate sodium  1 tablet Oral BID    ipratropium-albuterol  1 ampule Inhalation Q4H WA    enoxaparin  40 mg SubCUTAneous Daily       PRN Meds:   glucose, 4 tablet, PRN  dextrose bolus, 125 mL, PRN   Or  dextrose bolus, 250 mL, PRN  glucagon (rDNA), 1 mg, PRN  dextrose, , Continuous PRN  glucose, 4 tablet, PRN  dextrose bolus, 125 mL, PRN   Or  dextrose bolus, 250 mL, PRN  glucagon (rDNA), 1 mg, PRN  dextrose, , Continuous PRN  sodium chloride flush, 5-40 mL, PRN  sodium chloride, , PRN  polyethylene glycol, 17 g, Daily PRN  acetaminophen, 650 mg, Q6H PRN   Or  acetaminophen, 650 mg, Q6H PRN  sodium chloride flush, 5-40 mL, PRN  sodium chloride, , PRN  fentanNYL, 50 mcg, Q1H PRN  oxyCODONE, 5 mg, Q4H PRN    Continuous Infusions:   dextrose      dextrose      sodium chloride      norepinephrine Stopped (08/27/22 0950)    sodium chloride         Review of Systems  Non-obtainable     OBJECTIVE    BP (!) 90/54   Pulse 86   Temp 99.7 °F (37.6 °C) (Bladder)   Resp 18   Ht 6' 3\" (1.905 m)   Wt 200 lb (90.7 kg)   SpO2 97%   BMI 25.00 kg/m²     Intake/Output Summary (Last 24 hours) at 8/27/2022 1722  Last data filed at 8/27/2022 1538  Gross per 24 hour   Intake 2841.79 ml   Output 3080 ml   Net -238.21 ml       Physical examination:  General: was just extubated   HEENT: normocephalic non traumatic,   Neck: supple,    Pulm: good equal air entry   CVS: S1 + S2  Abd: soft lax, no tenderness  Skin: warm, no lesions, no rash. Review of Laboratory Data:  I personally reviewed the following labs:   Recent Labs     08/26/22 0217 08/26/22  0535 08/27/22 0447 08/27/22  1030   WBC 16.6* 14.6* 18.6*  --    RBC 4.37 3.81 3.03*  --    HGB 12.8 11.5* 8.9* 8.2*   HCT 37.3 33.0* 24.5* 23.0*   MCV 85.4 86.6 80.9  --    MCH 29.3 30.2 29.4  --    MCHC 34.3 34.8* 36.3*  --    RDW 15.7* 15.5* 15.3*  --     171 147  --    MPV 10.3 10.6 10.1  --      Recent Labs     08/26/22  0217 08/26/22  0314 08/26/22  0535 08/26/22  1000 08/26/22  1600 08/26/22 2158 08/27/22 0447   *   < > 127*   < > 129* 132 135   K 7.5*   < > 4.7   < > 5.7* 4.1 4.0      < > 102   < > 105 104 106   CO2 10*   < > 14*   < > 13* 17* 18*   BUN 11   < > 11   < > 10 9 8   CREATININE 1.3*   < > 1.2   < > 1.2 1.2 1.2   GLUCOSE 184*   < > 226*   < > 164* 112* 117*   CALCIUM 7.9*   < > 7.8*   < > 8.1* 8.1* 8.5*   PROT 6.2*  --  5.4*  --   --   --  5.9*   LABALBU 3.5  --  2.9*  --   --   --  3.3*   BILITOT 0.2  --  0.2  --   --   --  0.6   ALKPHOS 39*  --  37*  --   --   --  42   AST 21  --  19  --   --   --  16   ALT 8  --  7  --   --   --  <5    < > = values in this interval not displayed.      No results found for: BHYDRXBUT  Lab Results   Component Value Date/Time    LABA1C 5.6 08/26/2022 05:35 AM    LABA1C 6.7 07/18/2014 01:00 PM    LABA1C 5.8 06/26/2014 02:35 PM     Lab Results   Component Value Date/Time    TSH 0.036 (L) 08/26/2022 02:17 AM    T4FREE 0.31 (L) 08/27/2022 04:47 AM    P1TTWPY 4.0 (L) 03/30/2011 12:55 AM    FT3 2.5 03/04/2013 11:41 AM     Lab Results   Component Value Date/Time    LABA1C 5.6 08/26/2022 05:35 AM    GLUCOSE 117 08/27/2022 04:47 AM    GLUCOSE 74 03/30/2011 06:35 AM    MALBCR 170.5 07/12/2022 06:48 PM    LABMICR 34.1 07/12/2022 06:48 PM    LABCREA 20 07/12/2022 06:48 PM     Lab Results   Component Value Date/Time    TRIG 351 02/18/2014 12:35 PM    HDL 50 02/18/2014 12:35 PM    LDLCALC 197 02/18/2014 12:35 PM    CHOL 317 02/18/2014 12:35 PM       Blood culture   Lab Results   Component Value Date/Time    BC 24 Hours no growth 08/25/2022 11:15 AM    BC 5 Days- no growth 07/24/2014 03:06 PM       Radiology:  XR CHEST PORTABLE   Final Result   Increasing size left effusion and new right effusion         XR CHEST PORTABLE   Final Result   Stable bilateral pleural effusions. Stable cardiac silhouette size. XR CHEST PORTABLE   Final Result   Small left pleural effusion again noted, when compared to the previous study   performed 1 day earlier. Layering right pleural effusion now noted, as   described above. Bilateral lower lung field atelectasis again present. XR CHEST PORTABLE   Final Result   No significant interval change of the past 6 hours. ET tube and orogastric tube is in good position. XR ABDOMEN FOR NG/OG/NE TUBE PLACEMENT   Final Result   Satisfactory position of NG tube. XR CHEST PORTABLE   Final Result   Stable appearance of cardiomediastinal silhouette suggesting that residual   pericardial effusion is likely present. See above. XR CHEST PORTABLE   Final Result   No appreciable pneumothorax.          CTA CHEST W CONTRAST   Final Result No evidence of pulmonary embolism. Normal caliber thoracic aorta with no evidence for intramural hematoma or   dissection. Large extends to the pericardial effusion extending into the pericardial   recess. Small bilateral pleural effusions. Normal caliber  thoracic aorta with no evidence for intramural hematoma or   dissection. Discussed on 08/25/2022 at 1:40 a.m. with Dr. Shilo Rae. CTA ABDOMEN PELVIS W CONTRAST   Final Result   Normal caliber abdominal aorta with no evidence for dissection. 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 XR CHEST PORTABLE    (Results Pending)       Medical Records/Labs/Images review:   I personally reviewed and summarized previous records   All labs and imaging were reviewed independently     [address], a 46 y.o.-old male seen today for inpatient management of pan-hypopituitarism related to refractory shock. Panhypopituitarism  Pt s/p TSS for pituitary tumor almost 10 years ago. He developed panhypopituitarism after surgery     A. Central hypothyroidism   Level improving (free T4 increased to 0.3 this AM  Will continue Levothyroxine 125 mcg iv daily for the next few days   Check Free T4 and adjust the dose if needed   Goal to keep Free T4 in mid-upper limit of normal   Only free T4 should be used to adjust her thyroid medications     B. Secondary Adrenal Insufficiency   On stress dose Hydrocortisone 50 mg iv Q6hrs   Critical care team to taper once general condition improve   Patient need to be on at least maintenance dose of steroid all the time and stress dose for any stressful events. C. Central diabetes insipidus   Na improved,   Restart DDAVP at 150 mcg po BID   Will close monitor UOP and Na level        D.  Hypogonadotropic hypogonadism   Will hold Testosterone while inpatient     Diabetes Mellitus type 2  A1c 5.6%  Continue low dose sliding scale   Will titrate insulin dose based on the blood glucose trend & insulin requirement    Thank you for allowing us to participate in the care of this patient. Please do not hesitate to contact us with any additional questions. Kim Carpenter MD  Endocrinologist, [facility]   [address] [place] [id number]   Phone: [phone]  Fax: [phone]  --------------------------------------------  An electronic signature was used to authenticate this note.  Gabbi Miranda MD on 8/27/2022 at 5:22 PM

## 2022-08-27 NOTE — DISCHARGE INSTRUCTIONS
Discharge Instructions for Thoracic Surgery    What you will need at home:                          *  Digital Thermometer               *  Antibacterial Soap                *  Clean Wash Cloths             *  Someone to be with you for one week                NEVER SMOKE AGAIN! Absolutely no tobacco products! Do not allow others to smoke around you. Second hand smoke can be just as bad. The [facility] [employer] has a free program available, call [phone]. Activity:  Plan to walk indoors on days the temperature is below 40? or over 80? or during smog alerts. At first limit your stair climbing to once or twice a day. You may use the handrail for balance only. It is not unusual to feel tired for the first few weeks, but walking builds up your strength. Driving:  Do not drive while on pain medication. Incentive Spirometer:  Continue to use your lung exerciser for the next 4 weeks. Support your chest and cough each time you complete the 10 exercises. Medication:    Pain pills are ordered to keep you comfortable and able to increase activity         level. Your need for pain pills should decrease over the next 2 weeks. For mild pain you take Tylenol, but do not exceed 4000mg of Tylenol a day. Vicodin contains Tylenol,  so watch how much Tylenol (Acetaminophen) you take  Stool Softners: You may have been prescribed Colace (docusate), to help prevent straining with bowel movements. If your bowels have not moved by the second day home, take a laxative of your choice. If no bowel movement by the third day, call your surgeon. If you find you have the opposite problem and your stools are too soft, stop taking the stool softener. Avoid herbal, dietary products and vitamins unless OKd by your surgeon. If on Coumadin monitor Vitamin K intake and keep it consistent.             Call during business hours Monday through Friday for medication refills. [phone]      Incision Care:  [facility] YOUR INCISIONS DAILY WITH A CLEAN WASHCLOTH AND ANTIBACTERIAL SOAP. Do not wash your incisions after you have cleansed other parts of your body. You may shower but keep your back to the spray. Avoid hot water, comfortably warm is OK. ? If you went home with a dressing on any incision: Remove the dressing daily     and wash the incision with antibacterial soap and water and pat dry. Only     cover the incision with a dressing if it is draining. Otherwise leave it     uncovered (unless your doctor told you otherwise)  ? No tub baths until your incisions are healed. ? DO NOT APPLY ANYTHING OTHER THAN ANTIBACTERIAL SOAP AND     WATER TO YOUR INCISIONS. Do not apply lotions, creams, ointments,     hydrogen peroxide or powder. ? Keep your incisions CLEAN. Think CLEAN. No one should touch your     incisions without washing their hands first.  Do not let pets touch your incisions     (have incisions covered with clothing when around pets). ? Wear loose clothing. For support women should wear a bra. OLESYA Hose (white stockings): Should be worn during the day and off at night. Someone other than the patient will need to put on and take off the hose. It is too much pulling and tugging for the patient. Expect them to be difficult to put on and they should feel snug. These are usually worn for 2-4 weeks. Wash them by hand to keep their elasticity. Diet:  Eat a low fat, low salt diet. Eat a high fiber diet to avoid constipation and straining during bowel movements (whole grains, raw veggies, fruits)    Diabetics:  Check blood sugars twice a day. Contact your primary care physician if your blood sugar is consistently above 130. Good tissue healing occurs when blood sugars are less than 130. Sexual Activity: When you can climb 2 flights of stairs without chest pain, shortness of breath or fatigue, it is generally OK to resume sexual activity. Depression:   It is not unusual to have feelings of anxiety, fear or depression after surgery. If you need help with these feelings, call your primary care physician. There are medications to help and healing usually occurs sooner is youre not depressed. When to call the doctor:  [phone]    If you have sudden, severe shortness of breath or shortness of breath while resting. Pain, tenderness, warmth or redness in your calf. If your heart rate is below 50 or over 110 beats per minute, or symptoms of fluttering in your chest or irregular pulse. Temperature (taken daily) greater than 101? Golden Li If your bowels have not moved by the third day home. Persistent diarrhea, nausea or vomiting. If you are unable to eat, or unable to drink 5 glasses of fluid a day for more than one day. For redness, warmth, tenderness, drainage or swelling of any incision. For ANY chest incision drainage. If you have pain not relieved by pain medication. If you experience the same pain or other symptoms that brought you to the hospital or doctor before surgery. Diabetics:  Call Primary Care Physician for blood sugars consistently above 130. Depression:  Call Primary Care Physician if feelings of depression persist.      If you think something is seriously wrong go to the nearest emergency room!     Call 911 for any EMERGENCY and go to the nearest hospital!      Future Appointments   Date Time Provider Hailee Mares   9/6/2022  1:15 PM Eric Mar DO CARDIO SURG [facility]

## 2022-08-27 NOTE — CONSULTS
[address] [facility]  Department of Internal Medicine   Internal Medicine Residency   MICU Progress Note    Patient:  Tory Goins 46 y.o. male  MRN: [id number]     Date of Service: 8/27/2022    Allergy: Reglan [metoclopramide]    Subjective     I have seen and examine the patient at bed side. Patient is alert, not in acute breathing distress. ROS: Denies Fever/chills/CP/SOB/N/V/D/C/Dysuria/Blood in stool or urine  Objective     VS: BP (!) 90/54   Pulse 81   Temp 99.5 °F (37.5 °C) (Bladder)   Resp 17   Ht 6' 3\" (1.905 m)   Wt 200 lb (90.7 kg)   SpO2 95%   BMI 25.00 kg/m²   ABP (Arterial line BP): 117/73  ABP Mean (Arterial Line Mean): 89 mmHg    I & O - 24hr:   Intake/Output Summary (Last 24 hours) at 8/27/2022 1933  Last data filed at 8/27/2022 1900  Gross per 24 hour   Intake 2841.79 ml   Output 3220 ml   Net -378.21 ml       Physical Exam:  General Appearance: alert, appears stated age, and cooperative  Neck: no adenopathy, no carotid bruit, no JVD, supple, symmetrical, trachea midline, and thyroid not enlarged, symmetric, no tenderness/mass/nodules  Lung: clear to auscultation bilaterally  Heart: regular rate and rhythm, S1, S2 normal, no murmur, click, rub or gallop  Abdomen: soft, non-tender; bowel sounds normal; no masses,  no organomegaly  Extremities:  extremities normal, atraumatic, no cyanosis or edema  Musculoskeletal: No joint swelling, no muscle tenderness. ROM normal in all joints of extremities.    Neurologic: Mental status: Alert, oriented, thought content appropriate    Lines     site day    Art line   L Femoral    TLC R Fem    PICC L Arm    Hemoaccess None      Lab Results   Component Value Date/Time    PH 7.464 08/26/2022 06:43 PM    PCO2 23.1 08/26/2022 06:43 PM    PO2 64.9 08/26/2022 06:43 PM    HCO3 16.2 08/26/2022 06:43 PM    BE -6.0 08/26/2022 06:43 PM    THB 11.2 08/26/2022 06:43 PM    O2SAT 94.3 08/26/2022 06:43 PM        Medications         Labs   CBC:   Lab Results resolved      Hematology/ Oncology:   Low Hb  -Concern for bleeding following CT insertion? ?  -Follow up H&H      Endocrine:   Hx Panhypopituitarism  -Patient developed panhyp following TSS 10 years ago for pituitary adenoma  -Continue DDAVP 150 mg BID  -Hydrocortisone tapering    2. Central Hypothyroidism  -Levothyroxine 125 mcg  3. Type 2 DM  - On LDSS  -Monitor BS    4. Secondary adrenal insufficiency  -Low cortisol level  -Solu-cotef 50 mg IV q6h      PT/OT: See Early Mobility; Not required at this time  DVT ppx: Lovenox 40 mg  GI ppx: Protonix 40 mg BID        Seth Shaver MD, PGY-1    Attending physician: Dr. Grabiel Guillen    I personally saw, examined and provided care for the patient. Radiographs, labs and medication list were reviewed by me independently. I spoke with bedside nursing, therapists and consultants. Critical care services and times documented are independent of procedures and multidisciplinary rounds with Residents. Additionally comprehensive, multidisciplinary rounds were conducted with the MICU team. The case was discussed in detail and plans for care were established. Review of Residents documentation was conducted and revisions were made as appropriate. I agree with the above documented exam, problem list and plan of care.    Virginia Tam MD   CCT excluding procedures :28'

## 2022-08-27 NOTE — PLAN OF CARE
Problem: Pain  Goal: Verbalizes/displays adequate comfort level or baseline comfort level  8/26/2022 2357 by Lashell Hall RN  Outcome: Progressing     Problem: Safety - Adult  Goal: Free from fall injury  8/26/2022 2357 by Lashell Hall RN  Outcome: Progressing  Flowsheets (Taken 8/26/2022 2357)  Free From Fall Injury: Instruct family/caregiver on patient safety     Problem: Respiratory - Adult  Goal: Achieves optimal ventilation and oxygenation  8/26/2022 2357 by Lashell Hall RN  Outcome: Progressing     Problem: Cardiovascular - Adult  Goal: Maintains optimal cardiac output and hemodynamic stability  8/26/2022 2357 by Lashell Hall RN  Outcome: Progressing     Problem: Metabolic/Fluid and Electrolytes - Adult  Goal: Electrolytes maintained within normal limits  8/26/2022 2357 by Lashell Hall RN  Outcome: Progressing     Problem: Skin/Tissue Integrity  Goal: Absence of new skin breakdown  Description: 1. Monitor for areas of redness and/or skin breakdown  2. Assess vascular access sites hourly  3. Every 4-6 hours minimum:  Change oxygen saturation probe site  4. Every 4-6 hours:  If on nasal continuous positive airway pressure, respiratory therapy assess nares and determine need for appliance change or resting period.   Outcome: Progressing

## 2022-08-27 NOTE — PROGRESS NOTES
Hospitalist Progress Note      Synopsis: Patient with hx of anxiety, arthritis, GERD, pituitary tumor,  admitted on 8/24/2022 after presenting to the ED with abdominal pain that radiated to the back. In the Ed was noted to be hypotensive; imaging revealed large pericardial effusion. ED physician performed pericardiocentesis and a large amount of fluid aspirated; pressure briefly improved but then became hypotensive again; CTS consulted and pt underwent subxiphoid pericardial window; chest tubes have since been removed. Pt continued in shock even after window so decision was made to transfer from CVICU to MICU. BP improved with fluids and he has since transitioned off pressors. Subjective  Now off pressors  Pt fluid responsive and given bolus     Exam:  BP (!) 90/54   Pulse 81   Temp 99.5 °F (37.5 °C) (Bladder)   Resp 16   Ht 6' 3\" (1.905 m)   Wt 200 lb (90.7 kg)   SpO2 98%   BMI 25.00 kg/m²   General appearance: No apparent distress, appears stated age and cooperative. HEENT: Pupils equal, round, and reactive to light. Conjunctivae/corneas clear. Neck: Supple. No jugular venous distention. Trachea midline. Respiratory:  Normal respiratory effort. Clear to auscultation, bilaterally without Rales/Wheezes/Rhonchi. Cardiovascular: Regular rate and rhythm with normal S1/S2 without murmurs, rubs or gallops. Abdomen: Soft, non-tender, non-distended with normal bowel sounds. Musculoskeletal: No clubbing, cyanosis or edema bilaterally. Brisk capillary refill. 2+ lower extremity pulses (dorsalis pedis).    Skin:  No rashes    Neurologic: awake, alert and following commands     Medications:  Reviewed    Infusion Medications    dextrose      dextrose      sodium chloride      lactated ringers 75 mL/hr at 08/27/22 0690    norepinephrine 4 mcg/min (08/27/22 4828)    sodium chloride       Scheduled Medications    sodium phosphate IVPB  15 mmol IntraVENous Once    desmopressin  150 mcg Oral BID    calcium gluconate  1,000 mg IntraVENous Once    sodium polystyrene  15 g Oral Once    ampicillin-sulbactam  3,000 mg IntraVENous Q6H    levothyroxine  125 mcg IntraVENous Daily    insulin lispro  0-4 Units SubCUTAneous TID WC    insulin lispro  0-4 Units SubCUTAneous Nightly    sodium chloride flush  5-40 mL IntraVENous 2 times per day    mupirocin   Topical BID    pantoprazole  40 mg Oral BID    sodium chloride flush  5-40 mL IntraVENous 2 times per day    acetaminophen  650 mg Oral Q6H    sennosides-docusate sodium  1 tablet Oral BID    ipratropium-albuterol  1 ampule Inhalation Q4H WA    enoxaparin  40 mg SubCUTAneous Daily    hydrocortisone sodium succinate PF  50 mg IntraVENous Q6H     PRN Meds: glucose, dextrose bolus **OR** dextrose bolus, glucagon (rDNA), dextrose, glucose, dextrose bolus **OR** dextrose bolus, glucagon (rDNA), dextrose, sodium chloride flush, sodium chloride, ondansetron **OR** ondansetron, polyethylene glycol, acetaminophen **OR** acetaminophen, sodium chloride flush, sodium chloride, fentanNYL, oxyCODONE    I/O    Intake/Output Summary (Last 24 hours) at 8/27/2022 0923  Last data filed at 8/27/2022 0759  Gross per 24 hour   Intake 3754.33 ml   Output 2675 ml   Net 1079.33 ml       Labs:   Recent Labs     08/26/22  0217 08/26/22  0535 08/27/22  0447   WBC 16.6* 14.6* 18.6*   HGB 12.8 11.5* 8.9*   HCT 37.3 33.0* 24.5*    171 147       Recent Labs     08/26/22  0217 08/26/22  0314 08/26/22  0535 08/26/22  1000 08/26/22  1600 08/26/22  2158 08/27/22  0447   *   < > 127*   < > 129* 132 135   K 7.5*   < > 4.7   < > 5.7* 4.1 4.0      < > 102   < > 105 104 106   CO2 10*   < > 14*   < > 13* 17* 18*   BUN 11   < > 11   < > 10 9 8   CREATININE 1.3*   < > 1.2   < > 1.2 1.2 1.2   CALCIUM 7.9*   < > 7.8*   < > 8.1* 8.1* 8.5*   PHOS 2.2*  --  2.4*  --   --   --  2.2*    < > = values in this interval not displayed.        Recent Labs     08/24/22  2300 08/25/22  0450 08/26/22  0218 08/26/22  0535 08/27/22  0447   PROT 6.9   < > 6.2* 5.4* 5.9*   ALKPHOS 34*   < > 39* 37* 42   ALT 10   < > 8 7 <5   AST 26   < > 21 19 16   BILITOT 0.5   < > 0.2 0.2 0.6   LIPASE 17  --   --   --   --     < > = values in this interval not displayed. Recent Labs     08/25/22  0800   INR 1.4       No results for input(s): Anahi Risk in the last 72 hours. Chronic labs:  Lab Results   Component Value Date    CHOL 317 (H) 02/18/2014    TRIG 351 (H) 02/18/2014    HDL 50 02/18/2014    LDLCALC 197 (H) 02/18/2014    TSH 0.036 (L) 08/26/2022    PSA 0.60 03/25/2019    INR 1.4 08/25/2022    LABA1C 5.6 08/26/2022       Radiology:  Imaging studies reviewed today. ASSESSMENT:  Acute metabolic encephalopathy  Shock - likely multifactorial 2/2 cardiogenic, panhypopituitarism  Cardiac tamponade s/p pericardiocentesis and pericardial window  Acute hypoxic respiratory failure  GERD  Hyponatremia  Hyperkalemia  Lactic acidosis  Panhypopituitarism  Central hypothyroidism  DM2  Hypogonadotropic hypogonadism    PLAN:  Bolus as indicated, wean pressors as able  Wean oxygen as tolerated  Continue IV levothyroxine  Continues unasyn, follow cultures; WBC trending up   DDVAP on hold  Strict I/O, monitor renal function  Glycemic control  Continue solu-cortef; endocrine continues to follow patient for refractory shock      Diet: Diet NPO Exceptions are: Sips of Water with Meds, Sips of Clear Liquids  Code Status: Full Code  PT/OT Eval Status:   on hold   DVT Prophylaxis:   lovenox  Recommended disposition at discharge:  pending medical progression     +++++++++++++++++++++++++++++++++++++++++++++++++  Juarez De La Rosa MD   [facility].  +++++++++++++++++++++++++++++++++++++++++++++++++  NOTE: This report was transcribed using voice recognition software. Every effort was made to ensure accuracy; however, inadvertent computerized transcription errors may be present.

## 2022-08-27 NOTE — PROGRESS NOTES
Stage  CO CI HR SV SVI   Baseline 5.0 2.3 76 62.2 31   Challenge 4.9 2.0 78 56.5 26   Result (%?) 27.9

## 2022-08-27 NOTE — PLAN OF CARE
Problem: Respiratory - Adult  Goal: Achieves optimal ventilation and oxygenation  8/27/2022 1309 by Fidel Espana RN  Outcome: Progressing     Problem: Cardiovascular - Adult  Goal: Maintains optimal cardiac output and hemodynamic stability  8/27/2022 1309 by Fidel Espana RN  Outcome: Progressing     Problem: Metabolic/Fluid and Electrolytes - Adult  Goal: Electrolytes maintained within normal limits  8/27/2022 1309 by Fidel Espana RN  Outcome: Progressing     Problem: Skin/Tissue Integrity  Goal: Absence of new skin breakdown  Description: 1. Monitor for areas of redness and/or skin breakdown  2. Assess vascular access sites hourly  3. Every 4-6 hours minimum:  Change oxygen saturation probe site  4. Every 4-6 hours:  If on nasal continuous positive airway pressure, respiratory therapy assess nares and determine need for appliance change or resting period.   8/27/2022 1309 by Fidel Espana RN  Outcome: Progressing

## 2022-08-28 ENCOUNTER — APPOINTMENT (OUTPATIENT)
Dept: GENERAL RADIOLOGY | Age: 52
DRG: 270 | End: 2022-08-28
Payer: MEDICARE

## 2022-08-28 LAB
ALBUMIN SERPL-MCNC: 3.1 G/DL (ref 3.5–5.2)
ALP BLD-CCNC: 37 U/L (ref 40–129)
ALT SERPL-CCNC: <5 U/L (ref 0–40)
ANION GAP SERPL CALCULATED.3IONS-SCNC: 10 MMOL/L (ref 7–16)
AST SERPL-CCNC: 14 U/L (ref 0–39)
BASOPHILS ABSOLUTE: 0.01 E9/L (ref 0–0.2)
BASOPHILS RELATIVE PERCENT: 0.1 % (ref 0–2)
BILIRUB SERPL-MCNC: 0.4 MG/DL (ref 0–1.2)
BUN BLDV-MCNC: 7 MG/DL (ref 6–20)
CALCIUM SERPL-MCNC: 8.1 MG/DL (ref 8.6–10.2)
CHLORIDE BLD-SCNC: 106 MMOL/L (ref 98–107)
CO2: 20 MMOL/L (ref 22–29)
CREAT SERPL-MCNC: 1 MG/DL (ref 0.7–1.2)
EOSINOPHILS ABSOLUTE: 0 E9/L (ref 0.05–0.5)
EOSINOPHILS RELATIVE PERCENT: 0 % (ref 0–6)
GFR AFRICAN AMERICAN: >60
GFR NON-AFRICAN AMERICAN: >60 ML/MIN/1.73
GLUCOSE BLD-MCNC: 117 MG/DL (ref 74–99)
HCT VFR BLD CALC: 20.4 % (ref 37–54)
HCT VFR BLD CALC: 20.6 % (ref 37–54)
HEMOGLOBIN: 7.4 G/DL (ref 12.5–16.5)
HEMOGLOBIN: 7.4 G/DL (ref 12.5–16.5)
IMMATURE GRANULOCYTES #: 0.11 E9/L
IMMATURE GRANULOCYTES %: 0.8 % (ref 0–5)
LYMPHOCYTES ABSOLUTE: 1.69 E9/L (ref 1.5–4)
LYMPHOCYTES RELATIVE PERCENT: 12.4 % (ref 20–42)
MAGNESIUM: 2.2 MG/DL (ref 1.6–2.6)
MCH RBC QN AUTO: 30.2 PG (ref 26–35)
MCHC RBC AUTO-ENTMCNC: 36.3 % (ref 32–34.5)
MCV RBC AUTO: 83.3 FL (ref 80–99.9)
METER GLUCOSE: 118 MG/DL (ref 74–99)
METER GLUCOSE: 119 MG/DL (ref 74–99)
METER GLUCOSE: 127 MG/DL (ref 74–99)
METER GLUCOSE: 127 MG/DL (ref 74–99)
MONOCYTES ABSOLUTE: 0.55 E9/L (ref 0.1–0.95)
MONOCYTES RELATIVE PERCENT: 4.1 % (ref 2–12)
NEUTROPHILS ABSOLUTE: 11.22 E9/L (ref 1.8–7.3)
NEUTROPHILS RELATIVE PERCENT: 82.6 % (ref 43–80)
PDW BLD-RTO: 15 FL (ref 11.5–15)
PHOSPHORUS: 2 MG/DL (ref 2.5–4.5)
PLATELET # BLD: 144 E9/L (ref 130–450)
PMV BLD AUTO: 10.2 FL (ref 7–12)
POTASSIUM SERPL-SCNC: 3.3 MMOL/L (ref 3.5–5)
RBC # BLD: 2.45 E12/L (ref 3.8–5.8)
SODIUM BLD-SCNC: 136 MMOL/L (ref 132–146)
T4 FREE: 0.31 NG/DL (ref 0.93–1.7)
TOTAL PROTEIN: 5.6 G/DL (ref 6.4–8.3)
WBC # BLD: 13.6 E9/L (ref 4.5–11.5)

## 2022-08-28 PROCEDURE — 6360000002 HC RX W HCPCS: Performed by: SURGERY

## 2022-08-28 PROCEDURE — 2580000003 HC RX 258: Performed by: SURGERY

## 2022-08-28 PROCEDURE — 85025 COMPLETE CBC W/AUTO DIFF WBC: CPT

## 2022-08-28 PROCEDURE — 6370000000 HC RX 637 (ALT 250 FOR IP): Performed by: NURSE PRACTITIONER

## 2022-08-28 PROCEDURE — 94640 AIRWAY INHALATION TREATMENT: CPT

## 2022-08-28 PROCEDURE — 2580000003 HC RX 258

## 2022-08-28 PROCEDURE — 83735 ASSAY OF MAGNESIUM: CPT

## 2022-08-28 PROCEDURE — 2700000000 HC OXYGEN THERAPY PER DAY

## 2022-08-28 PROCEDURE — 2580000003 HC RX 258: Performed by: NURSE PRACTITIONER

## 2022-08-28 PROCEDURE — 2580000003 HC RX 258: Performed by: INTERNAL MEDICINE

## 2022-08-28 PROCEDURE — 84439 ASSAY OF FREE THYROXINE: CPT

## 2022-08-28 PROCEDURE — 2500000003 HC RX 250 WO HCPCS: Performed by: STUDENT IN AN ORGANIZED HEALTH CARE EDUCATION/TRAINING PROGRAM

## 2022-08-28 PROCEDURE — 6370000000 HC RX 637 (ALT 250 FOR IP)

## 2022-08-28 PROCEDURE — 2000000000 HC ICU R&B

## 2022-08-28 PROCEDURE — 82962 GLUCOSE BLOOD TEST: CPT

## 2022-08-28 PROCEDURE — 85018 HEMOGLOBIN: CPT

## 2022-08-28 PROCEDURE — 80053 COMPREHEN METABOLIC PANEL: CPT

## 2022-08-28 PROCEDURE — 84100 ASSAY OF PHOSPHORUS: CPT

## 2022-08-28 PROCEDURE — 6370000000 HC RX 637 (ALT 250 FOR IP): Performed by: INTERNAL MEDICINE

## 2022-08-28 PROCEDURE — 2500000003 HC RX 250 WO HCPCS

## 2022-08-28 PROCEDURE — 85014 HEMATOCRIT: CPT

## 2022-08-28 PROCEDURE — 6360000002 HC RX W HCPCS: Performed by: INTERNAL MEDICINE

## 2022-08-28 RX ORDER — HYDROCORTISONE 20 MG/1
20 TABLET ORAL 2 TIMES DAILY
Status: DISCONTINUED | OUTPATIENT
Start: 2022-08-28 | End: 2022-08-29

## 2022-08-28 RX ORDER — POLYETHYLENE GLYCOL 3350 17 G/17G
17 POWDER, FOR SOLUTION ORAL DAILY
Status: DISCONTINUED | OUTPATIENT
Start: 2022-08-28 | End: 2022-08-30 | Stop reason: HOSPADM

## 2022-08-28 RX ADMIN — PANTOPRAZOLE SODIUM 40 MG: 40 TABLET, DELAYED RELEASE ORAL at 09:17

## 2022-08-28 RX ADMIN — SODIUM CHLORIDE 3000 MG: 900 INJECTION INTRAVENOUS at 13:03

## 2022-08-28 RX ADMIN — DESMOPRESSIN ACETATE 150 MCG: 0.1 TABLET ORAL at 21:06

## 2022-08-28 RX ADMIN — SODIUM CHLORIDE 3000 MG: 900 INJECTION INTRAVENOUS at 05:29

## 2022-08-28 RX ADMIN — ACETAMINOPHEN 650 MG: 325 TABLET, FILM COATED ORAL at 21:14

## 2022-08-28 RX ADMIN — IPRATROPIUM BROMIDE AND ALBUTEROL SULFATE 1 AMPULE: 2.5; .5 SOLUTION RESPIRATORY (INHALATION) at 22:18

## 2022-08-28 RX ADMIN — DESMOPRESSIN ACETATE 150 MCG: 0.1 TABLET ORAL at 08:38

## 2022-08-28 RX ADMIN — IPRATROPIUM BROMIDE AND ALBUTEROL SULFATE 1 AMPULE: 2.5; .5 SOLUTION RESPIRATORY (INHALATION) at 08:23

## 2022-08-28 RX ADMIN — ACETAMINOPHEN 650 MG: 325 TABLET, FILM COATED ORAL at 17:29

## 2022-08-28 RX ADMIN — MUPIROCIN: 20 OINTMENT TOPICAL at 21:07

## 2022-08-28 RX ADMIN — POTASSIUM BICARBONATE 40 MEQ: 782 TABLET, EFFERVESCENT ORAL at 09:17

## 2022-08-28 RX ADMIN — HYDROCORTISONE 20 MG: 20 TABLET ORAL at 21:06

## 2022-08-28 RX ADMIN — LEVOTHYROXINE SODIUM ANHYDROUS 125 MCG: 100 INJECTION, POWDER, LYOPHILIZED, FOR SOLUTION INTRAVENOUS at 08:37

## 2022-08-28 RX ADMIN — SODIUM CHLORIDE, PRESERVATIVE FREE 10 ML: 5 INJECTION INTRAVENOUS at 21:06

## 2022-08-28 RX ADMIN — ACETAMINOPHEN 650 MG: 325 TABLET, FILM COATED ORAL at 04:48

## 2022-08-28 RX ADMIN — SENNOSIDES AND DOCUSATE SODIUM 1 TABLET: 50; 8.6 TABLET ORAL at 09:17

## 2022-08-28 RX ADMIN — IPRATROPIUM BROMIDE AND ALBUTEROL SULFATE 1 AMPULE: 2.5; .5 SOLUTION RESPIRATORY (INHALATION) at 16:19

## 2022-08-28 RX ADMIN — SODIUM CHLORIDE, PRESERVATIVE FREE 10 ML: 5 INJECTION INTRAVENOUS at 21:08

## 2022-08-28 RX ADMIN — HYDROCORTISONE 20 MG: 20 TABLET ORAL at 15:54

## 2022-08-28 RX ADMIN — SODIUM CHLORIDE 3000 MG: 900 INJECTION INTRAVENOUS at 23:26

## 2022-08-28 RX ADMIN — ENOXAPARIN SODIUM 40 MG: 100 INJECTION SUBCUTANEOUS at 09:17

## 2022-08-28 RX ADMIN — IPRATROPIUM BROMIDE AND ALBUTEROL SULFATE 1 AMPULE: 2.5; .5 SOLUTION RESPIRATORY (INHALATION) at 12:14

## 2022-08-28 RX ADMIN — HYDROCORTISONE SODIUM SUCCINATE 50 MG: 100 INJECTION, POWDER, FOR SOLUTION INTRAMUSCULAR; INTRAVENOUS at 04:49

## 2022-08-28 RX ADMIN — SODIUM CHLORIDE, PRESERVATIVE FREE 10 ML: 5 INJECTION INTRAVENOUS at 08:43

## 2022-08-28 RX ADMIN — POTASSIUM BICARBONATE 40 MEQ: 782 TABLET, EFFERVESCENT ORAL at 21:06

## 2022-08-28 RX ADMIN — MUPIROCIN: 20 OINTMENT TOPICAL at 08:25

## 2022-08-28 RX ADMIN — POTASSIUM PHOSPHATE, MONOBASIC AND POTASSIUM PHOSPHATE, DIBASIC 20 MMOL: 224; 236 INJECTION, SOLUTION, CONCENTRATE INTRAVENOUS at 08:42

## 2022-08-28 RX ADMIN — SODIUM CHLORIDE 3000 MG: 900 INJECTION INTRAVENOUS at 18:29

## 2022-08-28 RX ADMIN — PANTOPRAZOLE SODIUM 40 MG: 40 TABLET, DELAYED RELEASE ORAL at 18:27

## 2022-08-28 RX ADMIN — ACETAMINOPHEN 650 MG: 325 TABLET, FILM COATED ORAL at 10:42

## 2022-08-28 ASSESSMENT — PAIN SCALES - GENERAL
PAINLEVEL_OUTOF10: 0
PAINLEVEL_OUTOF10: 0
PAINLEVEL_OUTOF10: 2
PAINLEVEL_OUTOF10: 0

## 2022-08-28 NOTE — PROGRESS NOTES
[address] [facility]  Department of Internal Medicine   Internal Medicine Residency   MICU Progress Note    Patient:  Angel Rob 46 y.o. male  MRN: [id number]     Date of Service: 8/28/2022    Allergy: Reglan [metoclopramide]    Subjective     I have seen and examine the patient at bed side. Patient has been doing well. ROS: Denies Fever/chills/CP/SOB/N/V/D/C/Dysuria/Blood in stool or urine  Objective     VS: /64   Pulse 80   Temp 98.6 °F (37 °C) (Bladder)   Resp 15   Ht 6' 3\" (1.905 m)   Wt 200 lb (90.7 kg)   SpO2 94%   BMI 25.00 kg/m²   ABP (Arterial line BP): 99/62  ABP Mean (Arterial Line Mean): 74 mmHg    I & O - 24hr:   Intake/Output Summary (Last 24 hours) at 8/28/2022 0038  Last data filed at 8/27/2022 2200  Gross per 24 hour   Intake 2387.56 ml   Output 2745 ml   Net -357.44 ml       Physical Exam:  General Appearance: alert, appears stated age, and cooperative  Neck: no adenopathy, no carotid bruit, no JVD, supple, symmetrical, trachea midline, and thyroid not enlarged, symmetric, no tenderness/mass/nodules  Lung: clear to auscultation bilaterally  Heart: regular rate and rhythm, S1, S2 normal, no murmur, click, rub or gallop  Abdomen: soft, non-tender; bowel sounds normal; no masses,  no organomegaly  Extremities:  extremities normal, atraumatic, no cyanosis or edema  Musculoskeletal: No joint swelling, no muscle tenderness. ROM normal in all joints of extremities.    Neurologic: Mental status: Alert, oriented, thought content appropriate    Lines     site day    Art line   L Femoral 4   TLC R Fem 4   PICC L Arm 5   Hemoaccess None    ABG:     Lab Results   Component Value Date/Time    PH 7.464 08/26/2022 06:43 PM    PCO2 23.1 08/26/2022 06:43 PM    PO2 64.9 08/26/2022 06:43 PM    HCO3 16.2 08/26/2022 06:43 PM    BE -6.0 08/26/2022 06:43 PM    THB 11.2 08/26/2022 06:43 PM    O2SAT 94.3 08/26/2022 06:43 PM        Medications       Labs   CBC:   Lab Results   Component Value Date/Time    WBC 13.6 08/28/2022 04:03 AM    RBC 2.45 08/28/2022 04:03 AM    HGB 7.4 08/28/2022 10:28 AM    HCT 20.6 08/28/2022 10:28 AM    MCV 83.3 08/28/2022 04:03 AM    MCH 30.2 08/28/2022 04:03 AM    MCHC 36.3 08/28/2022 04:03 AM    RDW 15.0 08/28/2022 04:03 AM     08/28/2022 04:03 AM    MPV 10.2 08/28/2022 04:03 AM     BMP:    Lab Results   Component Value Date/Time     08/28/2022 04:03 AM    K 3.3 08/28/2022 04:03 AM    K 3.8 08/25/2022 04:50 AM     08/28/2022 04:03 AM    CO2 20 08/28/2022 04:03 AM    BUN 7 08/28/2022 04:03 AM    LABALBU 3.1 08/28/2022 04:03 AM    LABALBU 4.2 03/30/2011 06:35 AM    CREATININE 1.0 08/28/2022 04:03 AM    CALCIUM 8.1 08/28/2022 04:03 AM    GFRAA >60 08/28/2022 04:03 AM    LABGLOM >60 08/28/2022 04:03 AM    GLUCOSE 117 08/28/2022 04:03 AM    GLUCOSE 74 03/30/2011 06:35 AM         Resident's Assessment and Plan   Neurology:   AMS likely multifactorial 2/2 hypothyroidism, low cortisol, shock; resolved  -Patient is aware of time, place, and person  -Extubated on 08/26/2022     Cardiology:   Shock likely 2/2 cardiogenic vs panhypopituitarism; resolved  -Off Levophed  -If patient tolerates pressor off, likely to diuresis in the afternoon  -Monitor pro-Anam  -Continue Solu-Cortef 50 mg q12h     2. Cardiac Tamponade, s/p 2 pericardiocentesis in the ED and subxiphoid pericardial window 08/25/2022; resolved   -CT tube removed by thoracic surgeon, outpatient follow up. 3. Hx of Common Iliac aneurysm     Pulmonary:   Acute respiratory failure   -Not on respiratory distress  -Continue Esay Pap  - Respiratory culture grows rare Gm positive cocci in pairs  -Continue Unasyn    Nephrology (Fluids/ Electrolytes & Nutrition):   Hyponatremia, resolved  Hyperkalemia, resolved  Lactic acidosis, resolved   4. D/C yousif catheter     Hematology/ Oncology:   Low Hb, 7.4 today in the morning  -Concern for bleeding following CT insertion? ?  -Follow up Iron studies, FOBT Endocrine:   Hx Panhypopituitarism  -Patient developed panhyp following TSS 10 years ago for pituitary adenoma  -Continue DDAVP 150 mg BID  -Hydrocortisone tab 20 mg BID     2. Central Hypothyroidism  -Levothyroxine 125 mcg  3. Type 2 DM  - On LDSS  -Monitor BS     4. Secondary adrenal insufficiency  -Low cortisol level  -Solu-cotef 50 mg IV q6h     GI  -Patient has constipation  -Bowel sound present  -Bowel regimen     PT/OT: PT OT consulted  DVT ppx: Holding Lovenox;   GI ppx: Protonix 40 mg BID            Dylan Anna MD, PGY-1    Attending physician: Dr. Noy Velasquez    I personally saw, examined and provided care for the patient. Radiographs, labs and medication list were reviewed by me independently. I spoke with bedside nursing, therapists and consultants. Critical care services and times documented are independent of procedures and multidisciplinary rounds with Residents. Additionally comprehensive, multidisciplinary rounds were conducted with the MICU team. The case was discussed in detail and plans for care were established. Review of Residents documentation was conducted and revisions were made as appropriate. I agree with the above documented exam, problem list and plan of care.    Kay Wakefield MD   CCT excluding procedures :45'

## 2022-08-28 NOTE — PLAN OF CARE
Problem: Discharge Planning  Goal: Discharge to home or other facility with appropriate resources  Outcome: Progressing     Problem: Pain  Goal: Verbalizes/displays adequate comfort level or baseline comfort level  Outcome: Progressing     Problem: Safety - Adult  Goal: Free from fall injury  Outcome: Progressing     Problem: Cardiovascular - Adult  Goal: Maintains optimal cardiac output and hemodynamic stability  Outcome: Progressing

## 2022-08-28 NOTE — PROGRESS NOTES
Hospitalist Progress Note      Synopsis: Patient with hx of anxiety, arthritis, GERD, pituitary tumor,  admitted on 8/24/2022 after presenting to the ED with abdominal pain that radiated to the back. In the Ed was noted to be hypotensive; imaging revealed large pericardial effusion. ED physician performed pericardiocentesis and a large amount of fluid aspirated; pressure briefly improved but then became hypotensive again; CTS consulted and pt underwent subxiphoid pericardial window; chest tubes have since been removed. Pt continued in shock even after window so decision was made to transfer from CVICU to MICU. BP improved with fluids and he has since transitioned off pressors. Subjective  Pt fluid responsive and given bolus   States that he feels like his normal self   Exam:  /71   Pulse 67   Temp 99.1 °F (37.3 °C) (Bladder)   Resp 18   Ht 6' 3\" (1.905 m)   Wt 205 lb 14.4 oz (93.4 kg)   SpO2 96%   BMI 25.74 kg/m²   General appearance: No apparent distress, appears stated age and cooperative. HEENT: Pupils equal, round, and reactive to light. Conjunctivae/corneas clear. Neck: Supple. No jugular venous distention. Trachea midline. Respiratory:  Normal respiratory effort. Clear to auscultation, bilaterally without Rales/Wheezes/Rhonchi. Cardiovascular: Regular rate and rhythm with normal S1/S2 without murmurs, rubs or gallops. Abdomen: Soft, non-tender, non-distended with normal bowel sounds. Musculoskeletal: No clubbing, cyanosis or edema bilaterally. Brisk capillary refill. 2+ lower extremity pulses (dorsalis pedis).    Skin:  No rashes ; sutures on abdomen healing well   Neurologic: awake, alert and following commands     Medications:  Reviewed    Infusion Medications    dextrose      dextrose      sodium chloride      norepinephrine Stopped (08/27/22 0950)    sodium chloride       Scheduled Medications    potassium phosphate IVPB  20 mmol IntraVENous Once    potassium bicarb-citric acid 40 mEq Oral BID    polyethylene glycol  17 g Oral Daily    hydrocortisone  20 mg Oral BID    desmopressin  150 mcg Oral BID    calcium gluconate  1,000 mg IntraVENous Once    sodium polystyrene  15 g Oral Once    ampicillin-sulbactam  3,000 mg IntraVENous Q6H    levothyroxine  125 mcg IntraVENous Daily    insulin lispro  0-4 Units SubCUTAneous TID WC    insulin lispro  0-4 Units SubCUTAneous Nightly    sodium chloride flush  5-40 mL IntraVENous 2 times per day    mupirocin   Topical BID    pantoprazole  40 mg Oral BID    sodium chloride flush  5-40 mL IntraVENous 2 times per day    acetaminophen  650 mg Oral Q6H    sennosides-docusate sodium  1 tablet Oral BID    ipratropium-albuterol  1 ampule Inhalation Q4H WA    [Held by provider] enoxaparin  40 mg SubCUTAneous Daily     PRN Meds: glucose, dextrose bolus **OR** dextrose bolus, glucagon (rDNA), dextrose, glucose, dextrose bolus **OR** dextrose bolus, glucagon (rDNA), dextrose, sodium chloride flush, sodium chloride, acetaminophen **OR** acetaminophen, sodium chloride flush, sodium chloride, fentanNYL, oxyCODONE    I/O    Intake/Output Summary (Last 24 hours) at 8/28/2022 1150  Last data filed at 8/28/2022 0500  Gross per 24 hour   Intake 871.24 ml   Output 1160 ml   Net -288.76 ml         Labs:   Recent Labs     08/26/22  0535 08/27/22  0447 08/27/22  1030 08/28/22  0403 08/28/22  1028   WBC 14.6* 18.6*  --  13.6*  --    HGB 11.5* 8.9* 8.2* 7.4* 7.4*   HCT 33.0* 24.5* 23.0* 20.4* 20.6*    147  --  144  --          Recent Labs     08/26/22  0535 08/26/22  1000 08/26/22  2158 08/27/22  0447 08/28/22  0403   *   < > 132 135 136   K 4.7   < > 4.1 4.0 3.3*      < > 104 106 106   CO2 14*   < > 17* 18* 20*   BUN 11   < > 9 8 7   CREATININE 1.2   < > 1.2 1.2 1.0   CALCIUM 7.8*   < > 8.1* 8.5* 8.1*   PHOS 2.4*  --   --  2.2* 2.0*    < > = values in this interval not displayed.          Recent Labs     08/26/22  0535 08/27/22  0447 08/28/22  0403   PROT 5. 4* 5.9* 5.6*   ALKPHOS 37* 42 37*   ALT 7 <5 <5   AST 19 16 14   BILITOT 0.2 0.6 0.4         No results for input(s): INR in the last 72 hours. No results for input(s): Rosaura Martin in the last 72 hours. Chronic labs:  Lab Results   Component Value Date    CHOL 317 (H) 02/18/2014    TRIG 351 (H) 02/18/2014    HDL 50 02/18/2014    LDLCALC 197 (H) 02/18/2014    TSH 0.036 (L) 08/26/2022    PSA 0.60 03/25/2019    INR 1.4 08/25/2022    LABA1C 5.6 08/26/2022       Radiology:  Imaging studies reviewed today. ASSESSMENT:  Acute metabolic encephalopathy  Shock - likely multifactorial 2/2 cardiogenic, panhypopituitarism  Cardiac tamponade s/p pericardiocentesis and pericardial window  Acute hypoxic respiratory failure  GERD  Hyponatremia  Hyperkalemia  Lactic acidosis  Panhypopituitarism  Central hypothyroidism  DM2  Hypogonadotropic hypogonadism    PLAN:  Diuresis as tolerated  Wean oxygen as tolerated  Continue IV levothyroxine  Continues unasyn, follow cultures; WBC trending up   DDVAP on hold  Strict I/O, monitor renal function  Glycemic control  Continue solu-cortef; endocrine continues to follow patient for refractory shock  Transfuse as indicated   K replacement as indicated     Diet: ADULT DIET; Regular  Code Status: Full Code  PT/OT Eval Status:   on hold   DVT Prophylaxis:   lovenox  Recommended disposition at discharge:  pending medical progression     +++++++++++++++++++++++++++++++++++++++++++++++++  Marion Wellington MD   [facility].  +++++++++++++++++++++++++++++++++++++++++++++++++  NOTE: This report was transcribed using voice recognition software. Every effort was made to ensure accuracy; however, inadvertent computerized transcription errors may be present.

## 2022-08-28 NOTE — PLAN OF CARE
Problem: Pain  Goal: Verbalizes/displays adequate comfort level or baseline comfort level  Outcome: Progressing  Flowsheets (Taken 8/27/2022 2000)  Verbalizes/displays adequate comfort level or baseline comfort level:   Assess pain using appropriate pain scale   Encourage patient to monitor pain and request assistance     Problem: Safety - Adult  Goal: Free from fall injury  Outcome: Progressing     Problem: Respiratory - Adult  Goal: Achieves optimal ventilation and oxygenation  8/27/2022 2136 by Gian Downing RN  Outcome: Progressing     Problem: Cardiovascular - Adult  Goal: Maintains optimal cardiac output and hemodynamic stability  8/27/2022 2136 by Gian Downing RN  Outcome: Progressing     Problem: Infection - Adult  Goal: Absence of infection at discharge  Outcome: Progressing     Problem: Metabolic/Fluid and Electrolytes - Adult  Goal: Electrolytes maintained within normal limits  8/27/2022 2136 by Gian Downing RN  Outcome: Progressing

## 2022-08-29 ENCOUNTER — APPOINTMENT (OUTPATIENT)
Dept: GENERAL RADIOLOGY | Age: 52
DRG: 270 | End: 2022-08-29
Payer: MEDICARE

## 2022-08-29 LAB
ALBUMIN SERPL-MCNC: 3.4 G/DL (ref 3.5–5.2)
ALP BLD-CCNC: 42 U/L (ref 40–129)
ALT SERPL-CCNC: 5 U/L (ref 0–40)
ANION GAP SERPL CALCULATED.3IONS-SCNC: 11 MMOL/L (ref 7–16)
AST SERPL-CCNC: 16 U/L (ref 0–39)
BASOPHILS ABSOLUTE: 0.01 E9/L (ref 0–0.2)
BASOPHILS RELATIVE PERCENT: 0.1 % (ref 0–2)
BILIRUB SERPL-MCNC: 0.3 MG/DL (ref 0–1.2)
BLOOD BANK DISPENSE STATUS: NORMAL
BLOOD BANK PRODUCT CODE: NORMAL
BPU ID: NORMAL
BUN BLDV-MCNC: 9 MG/DL (ref 6–20)
CALCIUM SERPL-MCNC: 8.2 MG/DL (ref 8.6–10.2)
CHLORIDE BLD-SCNC: 107 MMOL/L (ref 98–107)
CO2: 22 MMOL/L (ref 22–29)
CREAT SERPL-MCNC: 0.9 MG/DL (ref 0.7–1.2)
DESCRIPTION BLOOD BANK: NORMAL
EOSINOPHILS ABSOLUTE: 0 E9/L (ref 0.05–0.5)
EOSINOPHILS RELATIVE PERCENT: 0 % (ref 0–6)
GFR AFRICAN AMERICAN: >60
GFR NON-AFRICAN AMERICAN: >60 ML/MIN/1.73
GLUCOSE BLD-MCNC: 109 MG/DL (ref 74–99)
HCT VFR BLD CALC: 21.6 % (ref 37–54)
HEMOGLOBIN: 7.6 G/DL (ref 12.5–16.5)
IMMATURE GRANULOCYTES #: 0.06 E9/L
IMMATURE GRANULOCYTES %: 0.5 % (ref 0–5)
LYMPHOCYTES ABSOLUTE: 2.23 E9/L (ref 1.5–4)
LYMPHOCYTES RELATIVE PERCENT: 19.1 % (ref 20–42)
MAGNESIUM: 2.2 MG/DL (ref 1.6–2.6)
MCH RBC QN AUTO: 29.9 PG (ref 26–35)
MCHC RBC AUTO-ENTMCNC: 35.2 % (ref 32–34.5)
MCV RBC AUTO: 85 FL (ref 80–99.9)
METER GLUCOSE: 115 MG/DL (ref 74–99)
METER GLUCOSE: 122 MG/DL (ref 74–99)
METER GLUCOSE: 141 MG/DL (ref 74–99)
MONOCYTES ABSOLUTE: 0.56 E9/L (ref 0.1–0.95)
MONOCYTES RELATIVE PERCENT: 4.8 % (ref 2–12)
NEUTROPHILS ABSOLUTE: 8.8 E9/L (ref 1.8–7.3)
NEUTROPHILS RELATIVE PERCENT: 75.5 % (ref 43–80)
PDW BLD-RTO: 15.1 FL (ref 11.5–15)
PHOSPHORUS: 1.6 MG/DL (ref 2.5–4.5)
PLATELET # BLD: 171 E9/L (ref 130–450)
PMV BLD AUTO: 10.6 FL (ref 7–12)
POTASSIUM SERPL-SCNC: 3.5 MMOL/L (ref 3.5–5)
RBC # BLD: 2.54 E12/L (ref 3.8–5.8)
SODIUM BLD-SCNC: 140 MMOL/L (ref 132–146)
T4 FREE: 0.29 NG/DL (ref 0.93–1.7)
TOTAL PROTEIN: 6 G/DL (ref 6.4–8.3)
WBC # BLD: 11.7 E9/L (ref 4.5–11.5)

## 2022-08-29 PROCEDURE — 97530 THERAPEUTIC ACTIVITIES: CPT

## 2022-08-29 PROCEDURE — 2580000003 HC RX 258: Performed by: INTERNAL MEDICINE

## 2022-08-29 PROCEDURE — 97162 PT EVAL MOD COMPLEX 30 MIN: CPT

## 2022-08-29 PROCEDURE — 2500000003 HC RX 250 WO HCPCS: Performed by: INTERNAL MEDICINE

## 2022-08-29 PROCEDURE — 2580000003 HC RX 258: Performed by: NURSE PRACTITIONER

## 2022-08-29 PROCEDURE — 2000000000 HC ICU R&B

## 2022-08-29 PROCEDURE — 2700000000 HC OXYGEN THERAPY PER DAY

## 2022-08-29 PROCEDURE — 71045 X-RAY EXAM CHEST 1 VIEW: CPT

## 2022-08-29 PROCEDURE — 6370000000 HC RX 637 (ALT 250 FOR IP): Performed by: NURSE PRACTITIONER

## 2022-08-29 PROCEDURE — 6360000002 HC RX W HCPCS: Performed by: SURGERY

## 2022-08-29 PROCEDURE — 84100 ASSAY OF PHOSPHORUS: CPT

## 2022-08-29 PROCEDURE — 83735 ASSAY OF MAGNESIUM: CPT

## 2022-08-29 PROCEDURE — 85025 COMPLETE CBC W/AUTO DIFF WBC: CPT

## 2022-08-29 PROCEDURE — 6370000000 HC RX 637 (ALT 250 FOR IP): Performed by: INTERNAL MEDICINE

## 2022-08-29 PROCEDURE — 94640 AIRWAY INHALATION TREATMENT: CPT

## 2022-08-29 PROCEDURE — 82962 GLUCOSE BLOOD TEST: CPT

## 2022-08-29 PROCEDURE — 2580000003 HC RX 258: Performed by: SURGERY

## 2022-08-29 PROCEDURE — 84439 ASSAY OF FREE THYROXINE: CPT

## 2022-08-29 PROCEDURE — 99233 SBSQ HOSP IP/OBS HIGH 50: CPT | Performed by: INTERNAL MEDICINE

## 2022-08-29 PROCEDURE — 86658 ENTEROVIRUS ANTIBODY: CPT

## 2022-08-29 PROCEDURE — 97165 OT EVAL LOW COMPLEX 30 MIN: CPT

## 2022-08-29 PROCEDURE — 99232 SBSQ HOSP IP/OBS MODERATE 35: CPT | Performed by: INTERNAL MEDICINE

## 2022-08-29 PROCEDURE — 6370000000 HC RX 637 (ALT 250 FOR IP)

## 2022-08-29 PROCEDURE — 80053 COMPREHEN METABOLIC PANEL: CPT

## 2022-08-29 RX ORDER — HYDROCORTISONE 5 MG/1
5 TABLET ORAL EVERY EVENING
Status: DISCONTINUED | OUTPATIENT
Start: 2022-08-31 | End: 2022-08-30 | Stop reason: HOSPADM

## 2022-08-29 RX ORDER — HYDROCORTISONE 20 MG/1
20 TABLET ORAL 2 TIMES DAILY
Status: DISCONTINUED | OUTPATIENT
Start: 2022-08-29 | End: 2022-08-30 | Stop reason: HOSPADM

## 2022-08-29 RX ORDER — IPRATROPIUM BROMIDE AND ALBUTEROL SULFATE 2.5; .5 MG/3ML; MG/3ML
1 SOLUTION RESPIRATORY (INHALATION) EVERY 4 HOURS PRN
Status: DISCONTINUED | OUTPATIENT
Start: 2022-08-29 | End: 2022-08-30 | Stop reason: HOSPADM

## 2022-08-29 RX ORDER — HYDROCORTISONE 5 MG/1
15 TABLET ORAL EVERY MORNING
Status: DISCONTINUED | OUTPATIENT
Start: 2022-08-31 | End: 2022-08-30 | Stop reason: HOSPADM

## 2022-08-29 RX ORDER — LEVOTHYROXINE SODIUM ANHYDROUS 100 UG/5ML
150 INJECTION, POWDER, LYOPHILIZED, FOR SOLUTION INTRAVENOUS DAILY
Status: COMPLETED | OUTPATIENT
Start: 2022-08-29 | End: 2022-08-29

## 2022-08-29 RX ADMIN — ACETAMINOPHEN 650 MG: 325 TABLET, FILM COATED ORAL at 16:01

## 2022-08-29 RX ADMIN — SENNOSIDES AND DOCUSATE SODIUM 1 TABLET: 50; 8.6 TABLET ORAL at 21:29

## 2022-08-29 RX ADMIN — SODIUM CHLORIDE 3000 MG: 900 INJECTION INTRAVENOUS at 12:38

## 2022-08-29 RX ADMIN — HYDROCORTISONE 20 MG: 20 TABLET ORAL at 07:53

## 2022-08-29 RX ADMIN — SODIUM CHLORIDE 3000 MG: 900 INJECTION INTRAVENOUS at 06:07

## 2022-08-29 RX ADMIN — ACETAMINOPHEN 650 MG: 325 TABLET, FILM COATED ORAL at 21:32

## 2022-08-29 RX ADMIN — DESMOPRESSIN ACETATE 150 MCG: 0.1 TABLET ORAL at 07:53

## 2022-08-29 RX ADMIN — SENNOSIDES AND DOCUSATE SODIUM 1 TABLET: 50; 8.6 TABLET ORAL at 07:53

## 2022-08-29 RX ADMIN — SODIUM PHOSPHATE, MONOBASIC, MONOHYDRATE AND SODIUM PHOSPHATE, DIBASIC, ANHYDROUS 15 MMOL: 276; 142 INJECTION, SOLUTION INTRAVENOUS at 18:24

## 2022-08-29 RX ADMIN — ACETAMINOPHEN 650 MG: 325 TABLET, FILM COATED ORAL at 04:11

## 2022-08-29 RX ADMIN — HYDROCORTISONE 20 MG: 20 TABLET ORAL at 21:27

## 2022-08-29 RX ADMIN — PANTOPRAZOLE SODIUM 40 MG: 40 TABLET, DELAYED RELEASE ORAL at 07:53

## 2022-08-29 RX ADMIN — POTASSIUM BICARBONATE 40 MEQ: 782 TABLET, EFFERVESCENT ORAL at 21:26

## 2022-08-29 RX ADMIN — SODIUM CHLORIDE 3000 MG: 900 INJECTION INTRAVENOUS at 17:47

## 2022-08-29 RX ADMIN — SODIUM CHLORIDE, PRESERVATIVE FREE 10 ML: 5 INJECTION INTRAVENOUS at 07:55

## 2022-08-29 RX ADMIN — DESMOPRESSIN ACETATE 150 MCG: 0.1 TABLET ORAL at 21:29

## 2022-08-29 RX ADMIN — PANTOPRAZOLE SODIUM 40 MG: 40 TABLET, DELAYED RELEASE ORAL at 16:01

## 2022-08-29 RX ADMIN — MUPIROCIN: 20 OINTMENT TOPICAL at 21:32

## 2022-08-29 RX ADMIN — POTASSIUM BICARBONATE 40 MEQ: 782 TABLET, EFFERVESCENT ORAL at 07:53

## 2022-08-29 RX ADMIN — MUPIROCIN: 20 OINTMENT TOPICAL at 07:54

## 2022-08-29 RX ADMIN — LEVOTHYROXINE SODIUM ANHYDROUS 150 MCG: 100 INJECTION, POWDER, LYOPHILIZED, FOR SOLUTION INTRAVENOUS at 10:12

## 2022-08-29 RX ADMIN — SODIUM CHLORIDE, PRESERVATIVE FREE 10 ML: 5 INJECTION INTRAVENOUS at 21:34

## 2022-08-29 RX ADMIN — ACETAMINOPHEN 650 MG: 325 TABLET, FILM COATED ORAL at 10:12

## 2022-08-29 RX ADMIN — IPRATROPIUM BROMIDE AND ALBUTEROL SULFATE 1 AMPULE: 2.5; .5 SOLUTION RESPIRATORY (INHALATION) at 08:29

## 2022-08-29 RX ADMIN — SODIUM CHLORIDE, PRESERVATIVE FREE 10 ML: 5 INJECTION INTRAVENOUS at 07:54

## 2022-08-29 RX ADMIN — HYDROCORTISONE 20 MG: 20 TABLET ORAL at 21:31

## 2022-08-29 ASSESSMENT — PAIN SCALES - GENERAL
PAINLEVEL_OUTOF10: 0

## 2022-08-29 NOTE — PLAN OF CARE
Problem: Discharge Planning  Goal: Discharge to home or other facility with appropriate resources  8/28/2022 2206 by Lashell Hall RN  Outcome: Progressing     Problem: Pain  Goal: Verbalizes/displays adequate comfort level or baseline comfort level  8/28/2022 2206 by Lashell Hall RN  Outcome: Progressing     Problem: Safety - Adult  Goal: Free from fall injury  8/28/2022 2206 by Lashell Hall RN  Outcome: Progressing     Problem: Respiratory - Adult  Goal: Achieves optimal ventilation and oxygenation  Outcome: Progressing     Problem: Cardiovascular - Adult  Goal: Maintains optimal cardiac output and hemodynamic stability  8/28/2022 2206 by Lashell Hall RN  Outcome: Progressing     Problem: Cardiovascular - Adult  Goal: Absence of cardiac dysrhythmias or at baseline  Outcome: Progressing     Problem: Metabolic/Fluid and Electrolytes - Adult  Goal: Electrolytes maintained within normal limits  Outcome: Progressing

## 2022-08-29 NOTE — PLAN OF CARE
Problem: Discharge Planning  Goal: Discharge to home or other facility with appropriate resources  Outcome: Progressing     Problem: Pain  Goal: Verbalizes/displays adequate comfort level or baseline comfort level  Outcome: Progressing     Problem: Safety - Adult  Goal: Free from fall injury  Outcome: Adequate for Discharge     Problem: Respiratory - Adult  Goal: Achieves optimal ventilation and oxygenation  Outcome: Adequate for Discharge     Problem: Cardiovascular - Adult  Goal: Maintains optimal cardiac output and hemodynamic stability  Outcome: Adequate for Discharge  Goal: Absence of cardiac dysrhythmias or at baseline  Outcome: Adequate for Discharge

## 2022-08-29 NOTE — PROGRESS NOTES
Hospitalist Progress Note      Synopsis: Patient with hx of anxiety, arthritis, GERD, pituitary tumor,  admitted on 8/24/2022 after presenting to the ED with abdominal pain that radiated to the back. In the Ed was noted to be hypotensive; imaging revealed large pericardial effusion. ED physician performed pericardiocentesis and a large amount of fluid aspirated; pressure briefly improved but then became hypotensive again; CTS consulted and pt underwent subxiphoid pericardial window; chest tubes have since been removed. Pt continued in shock even after window so decision was made to transfer from CVICU to MICU. BP improved with fluids and he has since transitioned off pressors. Continues on solucortef. Subjective  Pt fluid responsive and given bolus   States that he feels like his normal self   On room air  Reports fatigue       Exam:  /82   Pulse 76   Temp 98.1 °F (36.7 °C) (Oral)   Resp 16   Ht 6' 3\" (1.905 m)   Wt 205 lb 14.4 oz (93.4 kg)   SpO2 (!) 89%   BMI 25.74 kg/m²   General appearance: No apparent distress, appears stated age and cooperative. HEENT: Pupils equal, round, and reactive to light. Conjunctivae/corneas clear. Neck: Supple. No jugular venous distention. Trachea midline. Respiratory:  Normal respiratory effort. Clear to auscultation, bilaterally without Rales/Wheezes/Rhonchi. Cardiovascular: Regular rate and rhythm with normal S1/S2 without murmurs, rubs or gallops. Abdomen: Soft, non-tender, non-distended with normal bowel sounds. Musculoskeletal: No clubbing, cyanosis or edema bilaterally. Brisk capillary refill. 2+ lower extremity pulses (dorsalis pedis).    Skin:  No rashes ; sutures on abdomen healing well   Neurologic: awake, alert and following commands     Medications:  Reviewed    Infusion Medications    dextrose      dextrose      sodium chloride      norepinephrine Stopped (08/27/22 0950)    sodium chloride       Scheduled Medications    levothyroxine  150 mcg IntraVENous Daily    [START ON 8/30/2022] levothyroxine  175 mcg Oral Daily    hydrocortisone  20 mg Oral BID    [START ON 8/31/2022] hydrocortisone  15 mg Oral QAM    [START ON 8/31/2022] hydrocortisone  5 mg Oral QPM    potassium bicarb-citric acid  40 mEq Oral BID    polyethylene glycol  17 g Oral Daily    desmopressin  150 mcg Oral BID    calcium gluconate  1,000 mg IntraVENous Once    sodium polystyrene  15 g Oral Once    ampicillin-sulbactam  3,000 mg IntraVENous Q6H    insulin lispro  0-4 Units SubCUTAneous TID WC    insulin lispro  0-4 Units SubCUTAneous Nightly    sodium chloride flush  5-40 mL IntraVENous 2 times per day    mupirocin   Topical BID    pantoprazole  40 mg Oral BID    sodium chloride flush  5-40 mL IntraVENous 2 times per day    acetaminophen  650 mg Oral Q6H    sennosides-docusate sodium  1 tablet Oral BID    ipratropium-albuterol  1 ampule Inhalation Q4H WA    [Held by provider] enoxaparin  40 mg SubCUTAneous Daily     PRN Meds: glucose, dextrose bolus **OR** dextrose bolus, glucagon (rDNA), dextrose, glucose, dextrose bolus **OR** dextrose bolus, glucagon (rDNA), dextrose, sodium chloride flush, sodium chloride, acetaminophen **OR** acetaminophen, sodium chloride flush, sodium chloride, fentanNYL, oxyCODONE    I/O    Intake/Output Summary (Last 24 hours) at 8/29/2022 0836  Last data filed at 8/29/2022 0338  Gross per 24 hour   Intake 350 ml   Output 675 ml   Net -325 ml         Labs:   Recent Labs     08/27/22  0447 08/27/22  1030 08/28/22  0403 08/28/22  1028 08/29/22  0405   WBC 18.6*  --  13.6*  --  11.7*   HGB 8.9*   < > 7.4* 7.4* 7.6*   HCT 24.5*   < > 20.4* 20.6* 21.6*     --  144  --  171    < > = values in this interval not displayed.          Recent Labs     08/27/22  0447 08/28/22  0403 08/29/22  0405    136 140   K 4.0 3.3* 3.5    106 107   CO2 18* 20* 22   BUN 8 7 9   CREATININE 1.2 1.0 0.9   CALCIUM 8.5* 8.1* 8.2*   PHOS 2.2* 2.0* 1.6*         Recent Labs 08/27/22  0447 08/28/22  0403 08/29/22  0405   PROT 5.9* 5.6* 6.0*   ALKPHOS 42 37* 42   ALT <5 <5 5   AST 16 14 16   BILITOT 0.6 0.4 0.3         No results for input(s): INR in the last 72 hours. No results for input(s): Prentis Revels in the last 72 hours. Chronic labs:  Lab Results   Component Value Date    CHOL 317 (H) 02/18/2014    TRIG 351 (H) 02/18/2014    HDL 50 02/18/2014    LDLCALC 197 (H) 02/18/2014    TSH 0.036 (L) 08/26/2022    PSA 0.60 03/25/2019    INR 1.4 08/25/2022    LABA1C 5.6 08/26/2022       Radiology:  Imaging studies reviewed today. ASSESSMENT:  Acute metabolic encephalopathy  Shock - likely multifactorial 2/2 cardiogenic, panhypopituitarism  Cardiac tamponade s/p pericardiocentesis and pericardial window  Acute hypoxic respiratory failure  GERD  Hyponatremia  Hyperkalemia  Lactic acidosis  Panhypopituitarism  Central hypothyroidism  DM2  Hypogonadotropic hypogonadism    PLAN:  Diuresis as tolerated  Wean oxygen as tolerated  Continue IV levothyroxine  Continues unasyn, follow cultures; WBC down  DDVAP on hold  Strict I/O, monitor renal function  Glycemic control  Continue solu-cortef; endocrine continues to follow patient for refractory shock - now being titrated down  Transfuse as indicated   K replacement as indicated   OK to transfer out of ICU pending bed availability     Diet: ADULT DIET; Regular  Code Status: Full Code  PT/OT Eval Status:   on hold   DVT Prophylaxis:   lovenox  Recommended disposition at discharge:  pending medical progression     +++++++++++++++++++++++++++++++++++++++++++++++++  Marion Jeffrey MD   [facility].  +++++++++++++++++++++++++++++++++++++++++++++++++  NOTE: This report was transcribed using voice recognition software. Every effort was made to ensure accuracy; however, inadvertent computerized transcription errors may be present.

## 2022-08-29 NOTE — PROGRESS NOTES
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 old male with PMH listed below admitted to [facility] on 8/24/2022 because of abdominal pain, endocrine service was consulted for refractory shock in the setting of known pan-hypopituitarism.      Subjective   Seen through tele-health service this AM , no acute events feels better, Na normal       Scheduled Meds:   potassium bicarb-citric acid  40 mEq Oral BID    polyethylene glycol  17 g Oral Daily    hydrocortisone  20 mg Oral BID    desmopressin  150 mcg Oral BID    calcium gluconate  1,000 mg IntraVENous Once    sodium polystyrene  15 g Oral Once    ampicillin-sulbactam  3,000 mg IntraVENous Q6H    levothyroxine  125 mcg IntraVENous Daily    insulin lispro  0-4 Units SubCUTAneous TID WC    insulin lispro  0-4 Units SubCUTAneous Nightly    sodium chloride flush  5-40 mL IntraVENous 2 times per day    mupirocin   Topical BID    pantoprazole  40 mg Oral BID    sodium chloride flush  5-40 mL IntraVENous 2 times per day    acetaminophen  650 mg Oral Q6H    sennosides-docusate sodium  1 tablet Oral BID    ipratropium-albuterol  1 ampule Inhalation Q4H WA    [Held by provider] enoxaparin  40 mg SubCUTAneous Daily       PRN Meds:   glucose, 4 tablet, PRN  dextrose bolus, 125 mL, PRN   Or  dextrose bolus, 250 mL, PRN  glucagon (rDNA), 1 mg, PRN  dextrose, , Continuous PRN  glucose, 4 tablet, PRN  dextrose bolus, 125 mL, PRN   Or  dextrose bolus, 250 mL, PRN  glucagon (rDNA), 1 mg, PRN  dextrose, , Continuous PRN  sodium chloride flush, 5-40 mL, PRN  sodium chloride, , PRN  acetaminophen, 650 mg, Q6H PRN   Or  acetaminophen, 650 mg, Q6H PRN  sodium chloride flush, 5-40 mL, PRN  sodium chloride, , PRN  fentanNYL, 50 mcg, Q1H PRN  oxyCODONE, 5 mg, Q4H PRN    Continuous Infusions:   dextrose      dextrose      sodium chloride      norepinephrine Stopped (08/27/22 0950)    sodium chloride         Review of Systems  Non-obtainable     OBJECTIVE    /82   Pulse 58   Temp 98.5 °F (36.9 °C) (Oral)   Resp 18   Ht 6' 3\" (1.905 m)   Wt 205 lb 14.4 oz (93.4 kg)   SpO2 96%   BMI 25.74 kg/m²     Intake/Output Summary (Last 24 hours) at 8/29/2022 5062  Last data filed at 8/29/2022 2367  Gross per 24 hour   Intake 350 ml   Output 675 ml   Net -325 ml     Physical examination:  Due to this being a TeleHealth encounter, evaluation of the following organ systems is limited: Vitals/Constitutional/EENT/Resp/CV/GI//MS/Neuro/Skin/Heme-Lymph-Imm. Modified physical exam through Telemedicine camera    General: Communicating well via camera   Neck: no obvious neck mass. No obvious neck deformity     CVS: no distress   Chest: no distress. Chest is moving with respiration    Extremities:  no visible tremor  Skin: No visible rashes as seen from camera   Musculoskeletal: no visible deformity  Neuro: Alert and oriented to person, place, and time. Psychiatric: Normal mood and affect. Behavior is normal     Review of Laboratory Data:  I personally reviewed the following labs:   Recent Labs     08/27/22  0447 08/27/22  1030 08/28/22  0403 08/28/22  1028 08/29/22  0405   WBC 18.6*  --  13.6*  --  11.7*   RBC 3.03*  --  2.45*  --  2.54*   HGB 8.9*   < > 7.4* 7.4* 7.6*   HCT 24.5*   < > 20.4* 20.6* 21.6*   MCV 80.9  --  83.3  --  85.0   MCH 29.4  --  30.2  --  29.9   MCHC 36.3*  --  36.3*  --  35.2*   RDW 15.3*  --  15.0  --  15.1*     --  144  --  171   MPV 10.1  --  10.2  --  10.6    < > = values in this interval not displayed.      Recent Labs     08/27/22  0447 08/28/22  0403 08/29/22  0405    136 140   K 4.0 3.3* 3.5    106 107 CO2 18* 20* 22   BUN 8 7 9   CREATININE 1.2 1.0 0.9   GLUCOSE 117* 117* 109*   CALCIUM 8.5* 8.1* 8.2*   PROT 5.9* 5.6* 6.0*   LABALBU 3.3* 3.1* 3.4*   BILITOT 0.6 0.4 0.3   ALKPHOS 42 37* 42   AST 16 14 16   ALT <5 <5 5     No results found for: BHYDRXBUT  Lab Results   Component Value Date/Time    LABA1C 5.6 08/26/2022 05:35 AM    LABA1C 6.7 07/18/2014 01:00 PM    LABA1C 5.8 06/26/2014 02:35 PM     Lab Results   Component Value Date/Time    TSH 0.036 (L) 08/26/2022 02:17 AM    T4FREE 0.29 (L) 08/29/2022 04:05 AM    J7RVELT 4.0 (L) 03/30/2011 12:55 AM    FT3 2.5 03/04/2013 11:41 AM     Lab Results   Component Value Date/Time    LABA1C 5.6 08/26/2022 05:35 AM    GLUCOSE 109 08/29/2022 04:05 AM    GLUCOSE 74 03/30/2011 06:35 AM    MALBCR 170.5 07/12/2022 06:48 PM    LABMICR 34.1 07/12/2022 06:48 PM    LABCREA 20 07/12/2022 06:48 PM     Lab Results   Component Value Date/Time    TRIG 351 02/18/2014 12:35 PM    HDL 50 02/18/2014 12:35 PM    LDLCALC 197 02/18/2014 12:35 PM    CHOL 317 02/18/2014 12:35 PM       Blood culture   Lab Results   Component Value Date/Time    BC 24 Hours no growth 08/25/2022 11:15 AM    BC 5 Days- no growth 07/24/2014 03:06 PM       Radiology:  XR CHEST PORTABLE   Final Result   Increasing size left effusion and new right effusion         XR CHEST PORTABLE   Final Result   Stable bilateral pleural effusions. Stable cardiac silhouette size. XR CHEST PORTABLE   Final Result   Small left pleural effusion again noted, when compared to the previous study   performed 1 day earlier. Layering right pleural effusion now noted, as   described above. Bilateral lower lung field atelectasis again present. XR CHEST PORTABLE   Final Result   No significant interval change of the past 6 hours. ET tube and orogastric tube is in good position. XR ABDOMEN FOR NG/OG/NE TUBE PLACEMENT   Final Result   Satisfactory position of NG tube.          XR CHEST PORTABLE   Final Result Stable appearance of cardiomediastinal silhouette suggesting that residual   pericardial effusion is likely present. See above. XR CHEST PORTABLE   Final Result   No appreciable pneumothorax. CTA CHEST W CONTRAST   Final Result   No evidence of pulmonary embolism. Normal caliber thoracic aorta with no evidence for intramural hematoma or   dissection. Large extends to the pericardial effusion extending into the pericardial   recess. Small bilateral pleural effusions. Normal caliber  thoracic aorta with no evidence for intramural hematoma or   dissection. Discussed on 08/25/2022 at 1:40 a.m. with Dr. Vivian Case. CTA ABDOMEN PELVIS W CONTRAST   Final Result   Normal caliber abdominal aorta with no evidence for dissection. 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 XR CHEST PORTABLE    (Results Pending)   XR CHEST PORTABLE    (Results Pending)   XR CHEST PORTABLE    (Results Pending)   XR CHEST PORTABLE    (Results Pending)       Medical Records/Labs/Images review:   I personally reviewed and summarized previous records   All labs and imaging were reviewed independently     [address] [place] [address], a 46 y.o.-old male seen today for inpatient management of pan-hypopituitarism related to refractory shock. Panhypopituitarism  Pt s/p TSS for pituitary tumor almost 10 years ago. He developed panhypopituitarism after surgery     A. Central hypothyroidism   free T4 still below goal  Will given Levothyroxine 150  mcg x 1 dose today then switch to 175 mcg po daily starting from tomorrow   Goal to keep Free T4 in mid-upper limit of normal   Only free T4 should be used to adjust her thyroid medications     B.  Secondary Adrenal Insufficiency   Currently on Hydrocortisone 20  mg po BID   We recommend the following taper (HD 20 mg po BID x 3 days then 15mg am, 5 mg evening after that and stay on this dose until we seen at the office shortly after discharge)   Critical care team to taper once general condition improve   Patient need to be on at least maintenance dose of steroid all the time and stress dose for any stressful events. C. Central diabetes insipidus   Na WNL   Continue  DDAVP at 150 mcg po BID   Will close monitor UOP and Na level        D. Hypogonadotropic hypogonadism   Will hold Testosterone while inpatient     Diabetes Mellitus type 2  A1c 5.6%  Continue low dose sliding scale   Will titrate insulin dose based on the blood glucose trend & insulin requirement    Thank you for allowing us to participate in the care of this patient. Please do not hesitate to contact us with any additional questions. Jesu Pollack MD  Endocrinologist, [facility] [[email or web address]] [facility] and Endocrinology   [address] [place] [address]   Phone: [phone]  Fax: [phone]  --------------------------------------------  An electronic signature was used to authenticate this note.  Marilyn Tapia MD on 8/29/2022 at 7:52 AM
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
## 2022-08-29 NOTE — PROGRESS NOTES
[address] [facility]  Department of Internal Medicine   Internal Medicine Residency   MICU Progress Note    Patient:  Teinsha Harrington 46 y.o. male  MRN: [id number]     Date of Service: 8/29/2022    Allergy: Reglan [metoclopramide]    Subjective     Patient seen at bedside this morning. Patient said he feels good today. Patient denies symptoms including chest pain and shortness of breath. 24h change: no overnight events reported  ROS: Denies Fever/chills/CP/SOB/N/V/D/C/Dysuria/Blood in stool or urine  Objective     VS: /85   Pulse 67   Temp 98.4 °F (36.9 °C) (Oral)   Resp 16   Ht 6' 3\" (1.905 m)   Wt 205 lb 14.4 oz (93.4 kg)   SpO2 96%   BMI 25.74 kg/m²   ABP (Arterial line BP): 125/76  ABP Mean (Arterial Line Mean): 94 mmHg    I & O - 24hr:   Intake/Output Summary (Last 24 hours) at 8/29/2022 1830  Last data filed at 8/29/2022 1600  Gross per 24 hour   Intake 586.74 ml   Output 1025 ml   Net -438.26 ml       Physical Exam:  General Appearance: alert, appears stated age, and cooperative  Neck: no adenopathy, supple, symmetrical, trachea midline, and thyroid not enlarged, symmetric, no tenderness/mass/nodules  Lung: clear to auscultation bilaterally  Heart: regular rate and rhythm, S1, S2 normal, no murmur, click, rub or gallop  Abdomen: soft, non-tender; bowel sounds normal; no masses,  no organomegaly  Extremities:  extremities normal, atraumatic, no cyanosis or edema  Musculoskeletal: No joint swelling, no muscle tenderness. ROM normal in all joints of extremities.    Neurologic: Mental status: Alert, oriented, thought content appropriate    Lines     site day    Art line   None    TLC None    PICC None    Hemoaccess None    Oxygen:    nasal canula at 2L/min/face mask     ABG:     Lab Results   Component Value Date/Time    PH 7.464 08/26/2022 06:43 PM    PCO2 23.1 08/26/2022 06:43 PM    PO2 64.9 08/26/2022 06:43 PM    HCO3 16.2 08/26/2022 06:43 PM    BE -6.0 08/26/2022 06:43 PM    THB 11.2 08/26/2022 06:43 PM    O2SAT 94.3 08/26/2022 06:43 PM        Medications     Infusions: (Fluid, Sedation, Vasopressors)  No infusions at this time    Nutrition:   Regular adult diet     ATB:   Antibiotics  Days   unasyn 4             Skin issues: no skin issues at this time  Patient currently has   DVT prophylaxis/ GI prophylaxis,      Labs   CBC:   Lab Results   Component Value Date/Time    WBC 11.7 08/29/2022 04:05 AM    RBC 2.54 08/29/2022 04:05 AM    HGB 7.6 08/29/2022 04:05 AM    HCT 21.6 08/29/2022 04:05 AM    MCV 85.0 08/29/2022 04:05 AM    MCH 29.9 08/29/2022 04:05 AM    MCHC 35.2 08/29/2022 04:05 AM    RDW 15.1 08/29/2022 04:05 AM     08/29/2022 04:05 AM    MPV 10.6 08/29/2022 04:05 AM     CMP:    Lab Results   Component Value Date/Time     08/29/2022 04:05 AM    K 3.5 08/29/2022 04:05 AM    K 3.8 08/25/2022 04:50 AM     08/29/2022 04:05 AM    CO2 22 08/29/2022 04:05 AM    BUN 9 08/29/2022 04:05 AM    CREATININE 0.9 08/29/2022 04:05 AM    GFRAA >60 08/29/2022 04:05 AM    LABGLOM >60 08/29/2022 04:05 AM    GLUCOSE 109 08/29/2022 04:05 AM    GLUCOSE 74 03/30/2011 06:35 AM    PROT 6.0 08/29/2022 04:05 AM    LABALBU 3.4 08/29/2022 04:05 AM    LABALBU 4.2 03/30/2011 06:35 AM    CALCIUM 8.2 08/29/2022 04:05 AM    BILITOT 0.3 08/29/2022 04:05 AM    ALKPHOS 42 08/29/2022 04:05 AM    AST 16 08/29/2022 04:05 AM    ALT 5 08/29/2022 04:05 AM       Imaging Studies:  CXR:   8/29/22  Small bilateral pleural effusions, stable to slightly improved       Resident's Assessment and Plan     Acute encephalopathy 2/2 cardiogenic shock vs panhypopituitarism - resolved    Cardiac tamponade  - patient is s/p pericardiocentesis and cardiac windown on 8/25/22  - CXR (8/29/22): Small bilateral pleural effusions, stable to slightly improved     3.  Acute respiratory failure- improving  - patient currently requiring 2L NC  - respiratory cultures (+) gram + cocci in pairs   PLAN  - wean oxygen as tolerated  - continue
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
## 2022-08-29 NOTE — PROGRESS NOTES
Comprehensive Nutrition Assessment    Type and Reason for Visit:  Initial (LOS ICU)    Nutrition Recommendations/Plan:     Continue Current Diet, Start Oral Nutrition Supplement       Malnutrition Assessment:  Malnutrition Status: At risk for malnutrition (08/29/22 1423)    Context:  Acute Illness     Findings of the 6 clinical characteristics of malnutrition:  Energy Intake:  Mild decrease in energy intake  Weight Loss:   (Mild 10% wt loss x 8 mon, does not meet sig criteria)     Body Fat Loss:  No significant body fat loss     Muscle Mass Loss:  No significant muscle mass loss    Fluid Accumulation:  No significant fluid accumulation     Strength:  Not Performed    Nutrition Assessment:    Pt admit w/ abd pain found to have large pericardial effusion s/p pericardiocentesis x2, SIXTO, & subxiphoid pericardial window. PMHx DM & Brain tumor (benign. PO intake average ~25-50%. Will add Glucerna TID to aid in recovery. ([facility] out of stock)    Nutrition Related Findings:    Pt alert, extubated post-op, MAP WNL, +I/O's, no edema, active BS Wound Type: Surgical Incision (sternum)       Current Nutrition Intake & Therapies:    Average Meal Intake: 26-50% (average)     ADULT DIET; Regular    Anthropometric Measures:  Height: 6' 3\" (190.5 cm)  Ideal Body Weight (IBW): 196 lbs (89 kg)    Admission Body Weight: 205 lb (93 kg) (8/28 first measured)  Current Body Weight: 205 lb 14.4 oz (93.4 kg) (8/28 actual), 105.1 % IBW. Current BMI (kg/m2): 25.7  Usual Body Weight: 229 lb 13 oz (104.2 kg) (12/23/21 EMR measured wt from Office visit)  % Weight Change (Calculated): -10.4 wt loss x 8 mon per EMR                    BMI Categories: Overweight (BMI 25.0-29. 9)    Estimated Daily Nutrient Needs:  Energy Requirements Based On: Formula  Weight Used for Energy Requirements: Current  Energy (kcal/day): MSJ 1875 x 1.3 SF= 5382-9623  Weight Used for Protein Requirements: Ideal  Protein (g/day): 1.5-1.8 g/kg IBW; 135-160  Fluid (ml/day): per critical care    Nutrition Diagnosis:   Increased nutrient needs related to increase demand for energy/nutrients (s/p open heart) as evidenced by wounds    Nutrition Interventions:   Nutrition Education/Counseling: Education not indicated  Coordination of Nutrition Care: Continue to monitor while inpatient      Goals:    Goals: PO intake 75% or greater, by next RD assessment      Nutrition Monitoring and Evaluation:      Food/Nutrient Intake Outcomes: Food and Nutrient Intake, Supplement Intake  Physical Signs/Symptoms Outcomes: Biochemical Data, Nutrition Focused Physical Findings, Skin, Weight, GI Status, Hemodynamic Status, Fluid Status or Edema    Discharge Planning:     Too soon to determine     Cory Jimenez RD, LD  Contact: Ext [phone]

## 2022-08-30 ENCOUNTER — APPOINTMENT (OUTPATIENT)
Dept: GENERAL RADIOLOGY | Age: 52
DRG: 270 | End: 2022-08-30
Payer: MEDICARE

## 2022-08-30 VITALS
HEART RATE: 57 BPM | WEIGHT: 211.4 LBS | SYSTOLIC BLOOD PRESSURE: 139 MMHG | BODY MASS INDEX: 26.28 KG/M2 | TEMPERATURE: 98 F | RESPIRATION RATE: 17 BRPM | OXYGEN SATURATION: 98 % | HEIGHT: 75 IN | DIASTOLIC BLOOD PRESSURE: 91 MMHG

## 2022-08-30 LAB
ALBUMIN SERPL-MCNC: 3.2 G/DL (ref 3.5–5.2)
ALP BLD-CCNC: 45 U/L (ref 40–129)
ALT SERPL-CCNC: 13 U/L (ref 0–40)
ANAEROBIC CULTURE: NORMAL
ANION GAP SERPL CALCULATED.3IONS-SCNC: 9 MMOL/L (ref 7–16)
AST SERPL-CCNC: 35 U/L (ref 0–39)
BASOPHILS ABSOLUTE: 0.01 E9/L (ref 0–0.2)
BASOPHILS RELATIVE PERCENT: 0.1 % (ref 0–2)
BILIRUB SERPL-MCNC: 0.2 MG/DL (ref 0–1.2)
BLOOD CULTURE, ROUTINE: NORMAL
BUN BLDV-MCNC: 9 MG/DL (ref 6–20)
CALCIUM SERPL-MCNC: 7.9 MG/DL (ref 8.6–10.2)
CHLORIDE BLD-SCNC: 109 MMOL/L (ref 98–107)
CO2: 23 MMOL/L (ref 22–29)
CREAT SERPL-MCNC: 0.9 MG/DL (ref 0.7–1.2)
CULTURE, BLOOD 2: NORMAL
EOSINOPHILS ABSOLUTE: 0.07 E9/L (ref 0.05–0.5)
EOSINOPHILS RELATIVE PERCENT: 0.8 % (ref 0–6)
GFR AFRICAN AMERICAN: >60
GFR NON-AFRICAN AMERICAN: >60 ML/MIN/1.73
GLUCOSE BLD-MCNC: 89 MG/DL (ref 74–99)
HCT VFR BLD CALC: 24.6 % (ref 37–54)
HEMOGLOBIN: 8.7 G/DL (ref 12.5–16.5)
IMMATURE GRANULOCYTES #: 0.02 E9/L
IMMATURE GRANULOCYTES %: 0.2 % (ref 0–5)
LYMPHOCYTES ABSOLUTE: 2.92 E9/L (ref 1.5–4)
LYMPHOCYTES RELATIVE PERCENT: 33 % (ref 20–42)
MAGNESIUM: 2.2 MG/DL (ref 1.6–2.6)
MCH RBC QN AUTO: 30.3 PG (ref 26–35)
MCHC RBC AUTO-ENTMCNC: 35.4 % (ref 32–34.5)
MCV RBC AUTO: 85.7 FL (ref 80–99.9)
METER GLUCOSE: 93 MG/DL (ref 74–99)
MONOCYTES ABSOLUTE: 0.71 E9/L (ref 0.1–0.95)
MONOCYTES RELATIVE PERCENT: 8 % (ref 2–12)
NEUTROPHILS ABSOLUTE: 5.12 E9/L (ref 1.8–7.3)
NEUTROPHILS RELATIVE PERCENT: 57.9 % (ref 43–80)
PDW BLD-RTO: 15.4 FL (ref 11.5–15)
PHOSPHORUS: 1.8 MG/DL (ref 2.5–4.5)
PLATELET # BLD: 196 E9/L (ref 130–450)
PMV BLD AUTO: 10.3 FL (ref 7–12)
POTASSIUM SERPL-SCNC: 3.6 MMOL/L (ref 3.5–5)
RBC # BLD: 2.87 E12/L (ref 3.8–5.8)
SODIUM BLD-SCNC: 141 MMOL/L (ref 132–146)
T4 FREE: 0.37 NG/DL (ref 0.93–1.7)
TOTAL PROTEIN: 5.8 G/DL (ref 6.4–8.3)
WBC # BLD: 8.9 E9/L (ref 4.5–11.5)

## 2022-08-30 PROCEDURE — 6370000000 HC RX 637 (ALT 250 FOR IP): Performed by: NURSE PRACTITIONER

## 2022-08-30 PROCEDURE — 85025 COMPLETE CBC W/AUTO DIFF WBC: CPT

## 2022-08-30 PROCEDURE — 84439 ASSAY OF FREE THYROXINE: CPT

## 2022-08-30 PROCEDURE — 6370000000 HC RX 637 (ALT 250 FOR IP): Performed by: INTERNAL MEDICINE

## 2022-08-30 PROCEDURE — 6360000002 HC RX W HCPCS: Performed by: SURGERY

## 2022-08-30 PROCEDURE — 2580000003 HC RX 258: Performed by: INTERNAL MEDICINE

## 2022-08-30 PROCEDURE — 99233 SBSQ HOSP IP/OBS HIGH 50: CPT | Performed by: INTERNAL MEDICINE

## 2022-08-30 PROCEDURE — 2500000003 HC RX 250 WO HCPCS: Performed by: INTERNAL MEDICINE

## 2022-08-30 PROCEDURE — 80053 COMPREHEN METABOLIC PANEL: CPT

## 2022-08-30 PROCEDURE — 83735 ASSAY OF MAGNESIUM: CPT

## 2022-08-30 PROCEDURE — 71045 X-RAY EXAM CHEST 1 VIEW: CPT

## 2022-08-30 PROCEDURE — 82962 GLUCOSE BLOOD TEST: CPT

## 2022-08-30 PROCEDURE — 84100 ASSAY OF PHOSPHORUS: CPT

## 2022-08-30 PROCEDURE — 6370000000 HC RX 637 (ALT 250 FOR IP)

## 2022-08-30 PROCEDURE — 99232 SBSQ HOSP IP/OBS MODERATE 35: CPT | Performed by: INTERNAL MEDICINE

## 2022-08-30 PROCEDURE — 2580000003 HC RX 258: Performed by: SURGERY

## 2022-08-30 RX ORDER — POLYETHYLENE GLYCOL 3350 17 G/17G
17 POWDER, FOR SOLUTION ORAL DAILY
Qty: 527 G | Refills: 1 | Status: SHIPPED | OUTPATIENT
Start: 2022-08-31 | End: 2022-09-30

## 2022-08-30 RX ORDER — AMOXICILLIN AND CLAVULANATE POTASSIUM 875; 125 MG/1; MG/1
1 TABLET, FILM COATED ORAL EVERY 12 HOURS SCHEDULED
Status: DISCONTINUED | OUTPATIENT
Start: 2022-08-30 | End: 2022-08-30 | Stop reason: HOSPADM

## 2022-08-30 RX ORDER — AMOXICILLIN AND CLAVULANATE POTASSIUM 875; 125 MG/1; MG/1
1 TABLET, FILM COATED ORAL EVERY 12 HOURS SCHEDULED
Qty: 8 TABLET | Refills: 0 | Status: SHIPPED | OUTPATIENT
Start: 2022-08-30 | End: 2022-09-03

## 2022-08-30 RX ORDER — SENNA AND DOCUSATE SODIUM 50; 8.6 MG/1; MG/1
1 TABLET, FILM COATED ORAL 2 TIMES DAILY
Qty: 60 TABLET | Refills: 0 | Status: SHIPPED | OUTPATIENT
Start: 2022-08-30 | End: 2022-09-29

## 2022-08-30 RX ORDER — PANTOPRAZOLE SODIUM 40 MG/1
40 TABLET, DELAYED RELEASE ORAL 2 TIMES DAILY
Qty: 30 TABLET | Refills: 3 | Status: SHIPPED | OUTPATIENT
Start: 2022-08-30

## 2022-08-30 RX ORDER — DESMOPRESSIN ACETATE 0.1 MG/1
TABLET ORAL
Qty: 90 TABLET | Refills: 5 | Status: SHIPPED | OUTPATIENT
Start: 2022-08-30 | End: 2022-09-06 | Stop reason: SDUPTHER

## 2022-08-30 RX ORDER — LEVOTHYROXINE SODIUM 175 UG/1
175 TABLET ORAL DAILY
Qty: 30 TABLET | Refills: 5 | Status: SHIPPED | OUTPATIENT
Start: 2022-08-31 | End: 2022-09-06 | Stop reason: SDUPTHER

## 2022-08-30 RX ORDER — HYDROCORTISONE 5 MG/1
TABLET ORAL
Qty: 120 TABLET | Refills: 5 | Status: SHIPPED | OUTPATIENT
Start: 2022-08-30 | End: 2022-09-06 | Stop reason: SDUPTHER

## 2022-08-30 RX ADMIN — ACETAMINOPHEN 650 MG: 325 TABLET, FILM COATED ORAL at 04:56

## 2022-08-30 RX ADMIN — SODIUM CHLORIDE, PRESERVATIVE FREE 10 ML: 5 INJECTION INTRAVENOUS at 00:38

## 2022-08-30 RX ADMIN — MUPIROCIN: 20 OINTMENT TOPICAL at 08:20

## 2022-08-30 RX ADMIN — POTASSIUM BICARBONATE 40 MEQ: 782 TABLET, EFFERVESCENT ORAL at 08:19

## 2022-08-30 RX ADMIN — SODIUM CHLORIDE 3000 MG: 900 INJECTION INTRAVENOUS at 06:20

## 2022-08-30 RX ADMIN — SODIUM CHLORIDE, PRESERVATIVE FREE 10 ML: 5 INJECTION INTRAVENOUS at 08:20

## 2022-08-30 RX ADMIN — LEVOTHYROXINE SODIUM 175 MCG: 0.07 TABLET ORAL at 08:20

## 2022-08-30 RX ADMIN — SODIUM CHLORIDE 3000 MG: 900 INJECTION INTRAVENOUS at 00:01

## 2022-08-30 RX ADMIN — HYDROCORTISONE 20 MG: 20 TABLET ORAL at 08:20

## 2022-08-30 RX ADMIN — DESMOPRESSIN ACETATE 150 MCG: 0.1 TABLET ORAL at 08:19

## 2022-08-30 RX ADMIN — SODIUM PHOSPHATE, MONOBASIC, MONOHYDRATE AND SODIUM PHOSPHATE, DIBASIC, ANHYDROUS 20 MMOL: 276; 142 INJECTION, SOLUTION INTRAVENOUS at 09:57

## 2022-08-30 RX ADMIN — PANTOPRAZOLE SODIUM 40 MG: 40 TABLET, DELAYED RELEASE ORAL at 08:20

## 2022-08-30 ASSESSMENT — PAIN SCALES - GENERAL
PAINLEVEL_OUTOF10: 0

## 2022-08-30 NOTE — CARE COORDINATION
8/30:  Update CM Note:  Pt presented to the ER for abdominal pain. Pt was found to have a large pericardial effusion. Pericardiocentesis was performed. CTS was consulted & pt was transferred to MICU. Pt had a pericardial window on 8/25 with Dr. Lincoln Conklin. Pt was extubated on 8/26. Pt is on room air at 92% &  Iv Unasyn. CT tube removed om 8/30. CM met bedside to discuss dc planning. Pt dc plan is to return home & family/friend can transport him. Pt declines any HHC at this point. PT 17/24 OT 19/24. CM advise RN/Fabby to do 02 testing. Pt has no preferences for DME. .  Pt states that his friend works for hospice & can assist with the dressing changes. Cm sent message to Dr. Dian Cantu for her thoughts on d/c. Sw/CM will continue to follow for dc planning.   Electronically signed by Diamond Davis RN on 8/30/2022 at 8:53 AM

## 2022-08-30 NOTE — PLAN OF CARE
Problem: Discharge Planning  Goal: Discharge to home or other facility with appropriate resources  Outcome: Completed     Problem: Pain  Goal: Verbalizes/displays adequate comfort level or baseline comfort level  Outcome: Completed     Problem: Safety - Adult  Goal: Free from fall injury  Outcome: Completed     Problem: Respiratory - Adult  Goal: Achieves optimal ventilation and oxygenation  Outcome: Completed     Problem: Cardiovascular - Adult  Goal: Maintains optimal cardiac output and hemodynamic stability  Outcome: Completed  Goal: Absence of cardiac dysrhythmias or at baseline  Outcome: Completed     Problem: Infection - Adult  Goal: Absence of infection at discharge  Outcome: Completed  Goal: Absence of infection during hospitalization  Outcome: Completed  Goal: Absence of fever/infection during anticipated neutropenic period  Outcome: Completed     Problem: Metabolic/Fluid and Electrolytes - Adult  Goal: Electrolytes maintained within normal limits  Outcome: Completed     Problem: Skin/Tissue Integrity  Goal: Absence of new skin breakdown  Description: 1. Monitor for areas of redness and/or skin breakdown  2. Assess vascular access sites hourly  3. Every 4-6 hours minimum:  Change oxygen saturation probe site  4. Every 4-6 hours:  If on nasal continuous positive airway pressure, respiratory therapy assess nares and determine need for appliance change or resting period.   Outcome: Completed     Problem: Nutrition Deficit:  Goal: Optimize nutritional status  Outcome: Completed

## 2022-08-30 NOTE — PROGRESS NOTES
[address] [facility]  Department of Internal Medicine   Internal Medicine Residency   MICU Progress Note    Patient:  Rahat Duong 46 y.o. male  MRN: [id number]     Date of Service: 8/30/2022    Allergy: Reglan [metoclopramide]    Subjective     Patient seen and examined at bedside this morning. He has no complaints today. Denies any chest pain, shortness of breath. States he is eating well and ambulating okay. Objective     VS: BP (!) 139/91   Pulse 57   Temp 98 °F (36.7 °C) (Oral)   Resp 17   Ht 6' 3\" (1.905 m)   Wt 211 lb 6.4 oz (95.9 kg)   SpO2 98%   BMI 26.42 kg/m²   ABP (Arterial line BP): 125/76  ABP Mean (Arterial Line Mean): 94 mmHg    I & O - 24hr:   Intake/Output Summary (Last 24 hours) at 8/30/2022 1314  Last data filed at 8/30/2022 0957  Gross per 24 hour   Intake 2383.74 ml   Output 1500 ml   Net 883.74 ml       Physical Exam:  General Appearance: alert, appears stated age, and cooperative  Neck: no adenopathy, no carotid bruit, no JVD, supple, symmetrical, trachea midline, and thyroid not enlarged, symmetric, no tenderness/mass/nodules  Lung: clear to auscultation bilaterally  Heart: regular rate and rhythm, S1, S2 normal, no murmur, click, rub or gallop  Abdomen: soft, non-tender; bowel sounds normal; no masses,  no organomegaly  Extremities:  extremities normal, atraumatic, no cyanosis or edema  Musculoskeletal: No joint swelling, no muscle tenderness. ROM normal in all joints of extremities.    Neurologic: Mental status: Alert, oriented, thought content appropriate    Lines     site day    Art line   None    TLC None    PICC None    Hemoaccess None    Oxygen:    Room air  ABG:     Lab Results   Component Value Date/Time    PH 7.464 08/26/2022 06:43 PM    PCO2 23.1 08/26/2022 06:43 PM    PO2 64.9 08/26/2022 06:43 PM    HCO3 16.2 08/26/2022 06:43 PM    BE -6.0 08/26/2022 06:43 PM    THB 11.2 08/26/2022 06:43 PM    O2SAT 94.3 08/26/2022 06:43 PM        Medications     Infusions: the ICU  Currently on Unasyn, complete 5 days  Endocrinology following: Continue Synthroid, Cortef, DDAVP  Follow coxsackievirus testing if able  Discharge per primary team      Farshad Dang MD GABRIELA PGY-3  Attending physician: Dr. Whalen [facility]  Department of Pulmonary, Critical Care and Sleep Medicine  [address] [facility]  Department of Internal Medicine      During multidisciplinary team rounds Kamran Smith is a 46 y.o. male was seen, examined and discussed. This is confirmation that I have personally seen and examined the patient and that the key elements of the encounter were performed by me (> 85 % time). The medications & laboratory data was discussed and adjusted where necessary. The radiographic images were reviewed or with radiologist or consultant if felt dis-concordant with the exam or history. The above findings were corroborated, plans confirmed and changes made if needed. Family is updated at the bedside as available. Key issues of the case were discussed among consultants. Critical Care time is documented if appropriate.       Vane Glass DO, FACP, FCCP, Sharon Rich,

## 2022-08-30 NOTE — PROGRESS NOTES
CLINICAL PHARMACY NOTE: MEDS TO BEDS    Total # of Prescriptions Filled: 9   The following medications were delivered to the patient:  Desmopressin 0.1 mg  Mupirocin 2% oint  Senexon 8.6-50 mg  Amoxicilline 875-125 mg  Pantoprazole 40 mg  Peg 3350 powder  Effer-K  Levothyroxine 175 mcg  Hydrocotisone 5 mg    Additional Documentation:     Delivered to patient

## 2022-08-30 NOTE — PROGRESS NOTES
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 old male with PMH listed below admitted to [facility] on 8/24/2022 because of abdominal pain, endocrine service was consulted for refractory shock in the setting of known pan-hypopituitarism. Subjective   Seen and examined this AM, no acute events, going very good.  For discharge today       Scheduled Meds:   sodium phosphate IVPB  20 mmol IntraVENous Once    levothyroxine  175 mcg Oral Daily    hydrocortisone  20 mg Oral BID    [START ON 8/31/2022] hydrocortisone  15 mg Oral QAM    [START ON 8/31/2022] hydrocortisone  5 mg Oral QPM    potassium bicarb-citric acid  40 mEq Oral BID    polyethylene glycol  17 g Oral Daily    desmopressin  150 mcg Oral BID    calcium gluconate  1,000 mg IntraVENous Once    ampicillin-sulbactam  3,000 mg IntraVENous Q6H    insulin lispro  0-4 Units SubCUTAneous TID WC    insulin lispro  0-4 Units SubCUTAneous Nightly    sodium chloride flush  5-40 mL IntraVENous 2 times per day    mupirocin   Topical BID    pantoprazole  40 mg Oral BID    acetaminophen  650 mg Oral Q6H    sennosides-docusate sodium  1 tablet Oral BID    [Held by provider] enoxaparin  40 mg SubCUTAneous Daily       PRN Meds:   ipratropium-albuterol, 1 ampule, Q4H PRN  glucose, 4 tablet, PRN  dextrose bolus, 125 mL, PRN   Or  dextrose bolus, 250 mL, PRN  glucagon (rDNA), 1 mg, PRN  dextrose, , Continuous PRN  sodium chloride flush, 5-40 mL, PRN  sodium chloride, , PRN  acetaminophen, 650 mg, Q6H PRN   Or  acetaminophen, 650 mg, Q6H PRN  fentanNYL, 50 mcg, Q1H PRN  oxyCODONE, 5 mg, Q4H PRN    Continuous Infusions: 07/18/2014 01:00 PM    LABA1C 5.8 06/26/2014 02:35 PM     Lab Results   Component Value Date/Time    TSH 0.036 (L) 08/26/2022 02:17 AM    T4FREE 0.37 (L) 08/30/2022 05:54 AM    U4AOSKS 4.0 (L) 03/30/2011 12:55 AM    FT3 2.5 03/04/2013 11:41 AM     Lab Results   Component Value Date/Time    LABA1C 5.6 08/26/2022 05:35 AM    GLUCOSE 89 08/30/2022 05:54 AM    GLUCOSE 74 03/30/2011 06:35 AM    MALBCR 170.5 07/12/2022 06:48 PM    LABMICR 34.1 07/12/2022 06:48 PM    LABCREA 20 07/12/2022 06:48 PM     Lab Results   Component Value Date/Time    TRIG 351 02/18/2014 12:35 PM    HDL 50 02/18/2014 12:35 PM    LDLCALC 197 02/18/2014 12:35 PM    CHOL 317 02/18/2014 12:35 PM       Blood culture   Lab Results   Component Value Date/Time    BC 24 Hours no growth 08/25/2022 11:15 AM    BC 5 Days- no growth 07/24/2014 03:06 PM       Radiology:  XR CHEST PORTABLE   Final Result   Partial clearing of right lower lobe infiltrate         XR CHEST PORTABLE   Final Result   1. Small bilateral pleural effusions, stable to slightly improved compared   08/27/2022.      2.  No other significant interval changes. XR CHEST PORTABLE   Final Result   Increasing size left effusion and new right effusion         XR CHEST PORTABLE   Final Result   Stable bilateral pleural effusions. Stable cardiac silhouette size. XR CHEST PORTABLE   Final Result   Small left pleural effusion again noted, when compared to the previous study   performed 1 day earlier. Layering right pleural effusion now noted, as   described above. Bilateral lower lung field atelectasis again present. XR CHEST PORTABLE   Final Result   No significant interval change of the past 6 hours. ET tube and orogastric tube is in good position. XR ABDOMEN FOR NG/OG/NE TUBE PLACEMENT   Final Result   Satisfactory position of NG tube.          XR CHEST PORTABLE   Final Result   Stable appearance of cardiomediastinal silhouette suggesting that residual pericardial effusion is likely present. See above. XR CHEST PORTABLE   Final Result   No appreciable pneumothorax. CTA CHEST W CONTRAST   Final Result   No evidence of pulmonary embolism. Normal caliber thoracic aorta with no evidence for intramural hematoma or   dissection. Large extends to the pericardial effusion extending into the pericardial   recess. Small bilateral pleural effusions. Normal caliber  thoracic aorta with no evidence for intramural hematoma or   dissection. Discussed on 08/25/2022 at 1:40 a.m. with Dr. Corrinne Distel. CTA ABDOMEN PELVIS W CONTRAST   Final Result   Normal caliber abdominal aorta with no evidence for dissection. 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 XR CHEST PORTABLE    (Results Pending)       Medical Records/Labs/Images review:   I personally reviewed and summarized previous records   All labs and imaging were reviewed independently     [address], a 46 y.o.-old male seen today for inpatient management of pan-hypopituitarism related to refractory shock. Panhypopituitarism  Pt s/p TSS for pituitary tumor almost 10 years ago. He developed panhypopituitarism after surgery     A. Central hypothyroidism   free T4 still below goal  Continue levothyroxine 175 mcg po daily    Goal to keep Free T4 in mid-upper limit of normal   Only free T4 should be used to adjust her thyroid medications     B. Secondary Adrenal Insufficiency   Will change cortef dose to  15mg am, 5 mg in the evening   Critical care team to taper once general condition improve   Patient need to be on at least maintenance dose of steroid all the time and stress dose for any stressful events. C. Central diabetes insipidus   Na WNL   Continue  DDAVP at 150 mcg po BID   Will close monitor UOP and Na level        D.  Hypogonadotropic hypogonadism   Will hold Testosterone while inpatient     Diabetes Mellitus type 2  A1c 5.6%  Continue low dose sliding scale   Will titrate insulin dose based on the blood glucose trend & insulin requirement    Thank you for allowing us to participate in the care of this patient. Please do not hesitate to contact us with any additional questions. aFbiola Nguyen MD  Endocrinologist, [facility] Diabetes Care and Endocrinology   [address] [facility] [address]   Phone: [phone]  Fax: [phone]  --------------------------------------------  An electronic signature was used to authenticate this note.  Stef Sharma MD on 8/30/2022 at 11:47 AM

## 2022-08-31 ENCOUNTER — TELEPHONE (OUTPATIENT)
Dept: FAMILY MEDICINE CLINIC | Age: 52
End: 2022-08-31

## 2022-08-31 NOTE — TELEPHONE ENCOUNTER
[facility] Transitions Initial Follow Up Call    Outreach made within 2 business days of discharge: Yes    Patient: Amanda Horowitz Patient : 1970   MRN: [id number]  Reason for Admission: Pericardial effusion with cardiac tamponade  Discharge Date: 22       Spoke with: Left message to return call back.     Discharge department/facility: [facility]    Scheduled appointment with PCP within 7-14 days    Follow Up  Future Appointments   Date Time Provider Hailee Mares   2022  1:15 PM Krystle Zaldivar DO CARDIO SURG [facility]   2022  3:15 PM Nitin Brar MD [facility] [address] [place], [place]

## 2022-08-31 NOTE — PROGRESS NOTES
CC: Surgery follow up visit       HPI:  Frank Velasquez is a 46 y.o. male whom presents to the office today for routine post-operative follow-up status post: subxiphoid pericardial window on 8/25/22. Currently, there are no complaints of any CP, SOB, major concerns, or incisional issues. Review of Systems:   Constitutional: Negative for fever and chills. Respiratory: Negative for cough, chest tightness and shortness of breath. Cardiovascular: Negative for chest pain and leg swelling. Gastrointestinal: Negative for nausea, diarrhea and constipation. Skin: Negative for color change. Neurological: Negative for dizziness, syncope and light-headedness. Objective:   Physical Exam   Constitutional: oriented to person, place, and time. No distress. Cardiovascular: Normal rate. Pulmonary/Chest: Effort normal. No respiratory distress. Chest incision and chest tube incision well healed without evidence of infection. Abdominal: Soft. Bowel sounds are normal.   Musculoskeletal: Normal range of motion. Neurological: alert and oriented to person, place, and time. Skin: Skin is warm and dry. Psychiatric: normal mood and affect. Assessment:      S/P subxiphoid pericardial window    Suture removal      Plan:       Chest tube suture removed without difficulty, patient tolerated well  Wash incisions daily with soap and water. Shower only--Do not submerge for an additional 4 weeks or until incisions completely healed. Continue follow up with PCP as scheduled. Encouraged to call office with any questions, concerns. Otherwise no further follow up necessary from CTS standpoint.

## 2022-08-31 NOTE — TELEPHONE ENCOUNTER
Shradd[facility] Transitions Initial Follow Up Call    Outreach made within 2 business days of discharge: Yes    Patient: Patel Pretty Patient : 1970   MRN: [id number]  Reason for Admission: Pericardial effusion with cardiac tamponade  Discharge Date: 22       Spoke with: Marlin Rodriguez    [facility]    [facility] Interactive Patient Contact:  Was patient able to fill all prescriptions: Yes  Was patient instructed to bring all medications to the follow-up visit: Yes  Is patient taking all medications as directed in the discharge summary?  Yes  Does patient understand their discharge instructions: Yes  Does patient have questions or concerns that need addressed prior to 7-14 day follow up office visit: no    Scheduled appointment with PCP within 7-14 days    Follow Up  Future Appointments   Date Time Provider Hailee Mares   2022  1:15 PM Giovanni Cabrera DO CARDIO SURG [facility]   2022  3:15 PM Flynn Hernandez MD BD ENDO [facility]   2022  3:00 PM Kit Breaux DO [place], [place]

## 2022-09-01 LAB
EKG ATRIAL RATE: 75 BPM
EKG P AXIS: 76 DEGREES
EKG P-R INTERVAL: 192 MS
EKG Q-T INTERVAL: 530 MS
EKG QRS DURATION: 184 MS
EKG QTC CALCULATION (BAZETT): 591 MS
EKG R AXIS: -102 DEGREES
EKG T AXIS: 69 DEGREES
EKG VENTRICULAR RATE: 75 BPM

## 2022-09-02 ENCOUNTER — TELEPHONE (OUTPATIENT)
Dept: ENDOCRINOLOGY | Age: 52
End: 2022-09-02

## 2022-09-02 NOTE — TELEPHONE ENCOUNTER
Please call pt and check if can get blood work to check on his potassium today or tomorrow     He was started on Potassium supplement during recent hospitalization and want to make sure his level is ok

## 2022-09-06 ENCOUNTER — OFFICE VISIT (OUTPATIENT)
Dept: CARDIOTHORACIC SURGERY | Age: 52
End: 2022-09-06

## 2022-09-06 ENCOUNTER — OFFICE VISIT (OUTPATIENT)
Dept: ENDOCRINOLOGY | Age: 52
End: 2022-09-06
Payer: MEDICARE

## 2022-09-06 VITALS
WEIGHT: 203 LBS | SYSTOLIC BLOOD PRESSURE: 129 MMHG | BODY MASS INDEX: 25.24 KG/M2 | DIASTOLIC BLOOD PRESSURE: 84 MMHG | HEART RATE: 55 BPM | OXYGEN SATURATION: 99 % | HEIGHT: 75 IN

## 2022-09-06 VITALS
BODY MASS INDEX: 24.87 KG/M2 | SYSTOLIC BLOOD PRESSURE: 132 MMHG | DIASTOLIC BLOOD PRESSURE: 87 MMHG | HEIGHT: 75 IN | WEIGHT: 200 LBS | HEART RATE: 50 BPM

## 2022-09-06 DIAGNOSIS — E23.0 PANHYPOPITUITARISM (HCC): ICD-10-CM

## 2022-09-06 DIAGNOSIS — E23.2 DIABETES INSIPIDUS (HCC): ICD-10-CM

## 2022-09-06 DIAGNOSIS — E03.8 CENTRAL HYPOTHYROIDISM: ICD-10-CM

## 2022-09-06 DIAGNOSIS — Z98.890 S/P PERICARDIAL WINDOW CREATION: Primary | ICD-10-CM

## 2022-09-06 DIAGNOSIS — E29.1 MALE HYPOGONADISM: ICD-10-CM

## 2022-09-06 DIAGNOSIS — E23.0 PANHYPOPITUITARISM (HCC): Primary | ICD-10-CM

## 2022-09-06 DIAGNOSIS — Z48.02 VISIT FOR SUTURE REMOVAL: ICD-10-CM

## 2022-09-06 PROCEDURE — 99024 POSTOP FOLLOW-UP VISIT: CPT | Performed by: NURSE PRACTITIONER

## 2022-09-06 PROCEDURE — 1111F DSCHRG MED/CURRENT MED MERGE: CPT | Performed by: INTERNAL MEDICINE

## 2022-09-06 PROCEDURE — G8417 CALC BMI ABV UP PARAM F/U: HCPCS | Performed by: INTERNAL MEDICINE

## 2022-09-06 PROCEDURE — 99214 OFFICE O/P EST MOD 30 MIN: CPT | Performed by: INTERNAL MEDICINE

## 2022-09-06 PROCEDURE — 3017F COLORECTAL CA SCREEN DOC REV: CPT | Performed by: INTERNAL MEDICINE

## 2022-09-06 PROCEDURE — 4004F PT TOBACCO SCREEN RCVD TLK: CPT | Performed by: INTERNAL MEDICINE

## 2022-09-06 PROCEDURE — G8427 DOCREV CUR MEDS BY ELIG CLIN: HCPCS | Performed by: INTERNAL MEDICINE

## 2022-09-06 RX ORDER — TESTOSTERONE 20.25 MG/1.25G
GEL TOPICAL
Qty: 1.25 G | Refills: 5 | Status: SHIPPED | OUTPATIENT
Start: 2022-09-06 | End: 2023-02-27

## 2022-09-06 RX ORDER — DESMOPRESSIN ACETATE 0.1 MG/1
TABLET ORAL
Qty: 90 TABLET | Refills: 11 | Status: SHIPPED
Start: 2022-09-06 | End: 2022-11-02

## 2022-09-06 RX ORDER — HYDROCORTISONE 5 MG/1
TABLET ORAL
Qty: 230 TABLET | Refills: 3 | Status: SHIPPED | OUTPATIENT
Start: 2022-09-06

## 2022-09-06 RX ORDER — LEVOTHYROXINE SODIUM 175 UG/1
175 TABLET ORAL DAILY
Qty: 90 TABLET | Refills: 3 | Status: SHIPPED
Start: 2022-09-06 | End: 2022-11-02
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
## 2022-09-07 LAB
ANION GAP SERPL CALCULATED.3IONS-SCNC: 13 MMOL/L (ref 7–16)
BUN BLDV-MCNC: 11 MG/DL (ref 6–20)
CALCIUM SERPL-MCNC: 9.3 MG/DL (ref 8.6–10.2)
CHLORIDE BLD-SCNC: 105 MMOL/L (ref 98–107)
CO2: 25 MMOL/L (ref 22–29)
CREAT SERPL-MCNC: 1.2 MG/DL (ref 0.7–1.2)
GFR AFRICAN AMERICAN: >60
GFR NON-AFRICAN AMERICAN: >60 ML/MIN/1.73
GLUCOSE BLD-MCNC: 81 MG/DL (ref 74–99)
POTASSIUM SERPL-SCNC: 3.9 MMOL/L (ref 3.5–5)
PROLACTIN: <0.05 NG/ML
SODIUM BLD-SCNC: 143 MMOL/L (ref 132–146)
T4 FREE: 0.94 NG/DL (ref 0.93–1.7)
T4 TOTAL: 6.3 MCG/DL (ref 4.5–11.7)

## 2022-09-08 LAB
SEX HORMONE BINDING GLOBULIN: 69 NMOL/L (ref 11–80)
TESTOSTERONE FREE-NONMALE: 22.4 PG/ML (ref 47–244)
TESTOSTERONE TOTAL: 196 NG/DL (ref 220–1000)

## 2022-09-09 ENCOUNTER — TELEPHONE (OUTPATIENT)
Dept: ENDOCRINOLOGY | Age: 52
End: 2022-09-09

## 2022-09-09 DIAGNOSIS — E29.1 MALE HYPOGONADISM: Primary | ICD-10-CM

## 2022-09-09 LAB — GROWTH HORMONE: <0.05 NG/ML (ref 0.05–3)

## 2022-09-09 NOTE — TELEPHONE ENCOUNTER
Notify pt,  I have reviewed your recent results    Great!, thyroid hormones results and sodium level are within an acceptable range of normal. Continue currently dose of levothyroxine, hydrocortisone and desmopressin     Testosterone level was low.  Please check if was consistent with taking Testosterone every day and also confirm his current dose of Androgel

## 2022-09-11 LAB
IGF-1 (INSULIN-LIKE GROWTH I): 28 NG/ML (ref 66–225)
INSULIN-LIKE GROWTH FACTOR-1 Z-SCORE: -5.3

## 2022-09-13 ENCOUNTER — OFFICE VISIT (OUTPATIENT)
Dept: FAMILY MEDICINE CLINIC | Age: 52
End: 2022-09-13
Payer: MEDICARE

## 2022-09-13 VITALS
HEART RATE: 63 BPM | RESPIRATION RATE: 16 BRPM | OXYGEN SATURATION: 97 % | WEIGHT: 197 LBS | HEIGHT: 75 IN | TEMPERATURE: 97.8 F | BODY MASS INDEX: 24.49 KG/M2 | DIASTOLIC BLOOD PRESSURE: 62 MMHG | SYSTOLIC BLOOD PRESSURE: 124 MMHG

## 2022-09-13 DIAGNOSIS — I31.4 CARDIAC TAMPONADE: ICD-10-CM

## 2022-09-13 DIAGNOSIS — E23.0 PANHYPOPITUITARISM (HCC): ICD-10-CM

## 2022-09-13 DIAGNOSIS — Z09 HOSPITAL DISCHARGE FOLLOW-UP: Primary | ICD-10-CM

## 2022-09-13 DIAGNOSIS — E27.49 SECONDARY ADRENAL INSUFFICIENCY (HCC): ICD-10-CM

## 2022-09-13 DIAGNOSIS — E23.2 PRIMARY CENTRAL DIABETES INSIPIDUS (HCC): ICD-10-CM

## 2022-09-13 LAB
COXSACKIE B1 ANTIBODY: ABNORMAL
COXSACKIE B2 ANTIBODY: ABNORMAL
COXSACKIE B3 ANTIBODY: ABNORMAL
COXSACKIE B4 ANTIBODY: ABNORMAL
COXSACKIE B5 ANTIBODY: ABNORMAL
COXSACKIE B6 ANTIBODY: ABNORMAL

## 2022-09-13 PROCEDURE — 1111F DSCHRG MED/CURRENT MED MERGE: CPT | Performed by: INTERNAL MEDICINE

## 2022-09-13 PROCEDURE — 99495 TRANSJ CARE MGMT MOD F2F 14D: CPT | Performed by: INTERNAL MEDICINE

## 2022-09-13 NOTE — PROGRESS NOTES
Post-Discharge Transitional Care  Follow Up      Frank Velasquez   YOB: 1970    Date of Office Visit:  9/13/2022  Date of Hospital Admission: 8/24/22  Date of Hospital Discharge: 8/30/22  Risk of hospital readmission (high >=14%. Medium >=10%) :Readmission Risk Score: 14.2      Care management risk score Rising risk (score 2-5) and Complex Care (Scores >=6): No Risk Score On File     Non face to face  following discharge, date last encounter closed (first attempt may have been earlier): *No documented post hospital discharge outreach found in the last 14 days    Call initiated 2 business days of discharge: *No response recorded in the last 14 days    ASSESSMENT/PLAN:   Hospital discharge follow-up  -     DE DISCHARGE MEDS RECONCILED W/ CURRENT OUTPATIENT MED LIST  Panhypopituitarism ([address]  Primary central diabetes insipidus ([address]  Secondary adrenal insufficiency ([address]  Cardiac tamponade    Medical Decision Making: moderate complexity  Return in 3 months (on 12/13/2022). On this date 9/13/2022 I have spent 40 minutes reviewing previous notes, test results and face to face with the patient discussing the diagnosis and importance of compliance with the treatment plan as well as documenting on the day of the visit. Subjective:   HPI:  Follow up of Hospital problems/diagnosis(es):   Active Hospital Problems     Diagnosis Date Noted    Central hypothyroidism [E03.8] 08/26/2022       Priority: Medium    Secondary adrenal insufficiency ([address] [E27.49] 08/26/2022       Priority: Medium    Pericardial effusion with cardiac tamponade [I31.3, I31.4] 08/25/2022       Priority: Medium    Cardiac tamponade [I31.4] 08/25/2022       Priority: Medium    Panhypopituitarism ([address] [E23.0] 01/21/2019        Inpatient course: Discharge summary reviewed- see chart.   Patient with hx of anxiety, arthritis, GERD, pituitary tumor,  admitted on 8/24/2022 after presenting to the ED with abdominal pain that radiated to the back. In the Ed was noted to be hypotensive; imaging revealed large pericardial effusion. ED physician performed pericardiocentesis and a large amount of fluid aspirated; pressure briefly improved but then became hypotensive again; CTS consulted and pt underwent subxiphoid pericardial window; chest tubes have since been removed. Pt continued in shock even after window so decision was made to transfer from CVICU to MICU. BP improved with fluids and he has since transitioned off pressors. Continues on solucortef. Transitioned to oral medications and hydrocortisone wean. Discharged to home on 8/30/22 in stable condition with pcp follow up     Interval history/Current status:   Doing well. Compliant with all medication. Recent lab work order by endocrinology reviewed. Patient Active Problem List   Diagnosis    Left hip pain    Lumbar radiculopathy, acute    Chronic back pain    Anxiety    Erectile dysfunction    Common iliac aneurysm (HCC)    Lipid disorder    H/O: pituitary tumor    Male hypogonadism    Panhypopituitarism (HCC)    Vitamin D deficiency    Acute streptococcal pharyngitis    Nausea    Hyperkalemia    Pericardial effusion with cardiac tamponade    Cardiac tamponade    Central hypothyroidism    Secondary adrenal insufficiency ([facility]       Medications listed as ordered at the time of discharge from hospital     Medication List            Accurate as of September 13, 2022 11:59 PM. If you have any questions, ask your nurse or doctor.                 CONTINUE taking these medications      desmopressin 0.1 MG tablet  Commonly known as: DDAVP  Take 1.5 tablets (150 mcg) twice a day     famotidine 40 MG tablet  Commonly known as: PEPCID     hydrocortisone 5 MG tablet  Commonly known as: Cortef  Take 3 tablets (15 mg) in the morning and 1 tablet (5 mg) in the evening     levothyroxine 175 MCG tablet  Commonly known as: SYNTHROID  Take 1 tablet by mouth Daily     loratadine 10 MG tablet  Commonly known as: Claritin  Take 1 tablet by mouth daily     mupirocin 2 % ointment  Commonly known as: BACTROBAN  Apply topically 3 times daily. NONFORMULARY     pantoprazole 40 MG tablet  Commonly known as: PROTONIX  Take 1 tablet by mouth in the morning and 1 tablet in the evening. polyethylene glycol 17 g packet  Commonly known as: GLYCOLAX  Take 17 g by mouth daily     potassium bicarb-citric acid 20 MEQ Tbef effervescent tablet  Commonly known as: EFFER-K  Take 1 tablet by mouth daily for 4 days     sennosides-docusate sodium 8.6-50 MG tablet  Commonly known as: SENOKOT-S  Take 1 tablet by mouth 2 times daily     Testosterone 20.25 MG/1.25GM (1.62%) Gel  Apply 2 pump press to  upper arm and shoulder daily                Medications marked \"taking\" at this time  Outpatient Medications Marked as Taking for the 9/13/22 encounter (Office Visit) with Edgar Chinchilla, DO   Medication Sig Dispense Refill    hydrocortisone (CORTEF) 5 MG tablet Take 3 tablets (15 mg) in the morning and 1 tablet (5 mg) in the evening 230 tablet 3    levothyroxine (SYNTHROID) 175 MCG tablet Take 1 tablet by mouth Daily 90 tablet 3    desmopressin (DDAVP) 0.1 MG tablet Take 1.5 tablets (150 mcg) twice a day 90 tablet 11    Testosterone 20.25 MG/1.25GM (1.62%) GEL Apply 2 pump press to  upper arm and shoulder daily 1.25 g 5    sennosides-docusate sodium (SENOKOT-S) 8.6-50 MG tablet Take 1 tablet by mouth 2 times daily 60 tablet 0    loratadine (CLARITIN) 10 MG tablet Take 1 tablet by mouth daily 90 tablet 1        Medications patient taking as of now reconciled against medications ordered at time of hospital discharge: Yes    A comprehensive review of systems was negative except for what was noted in the HPI.     Objective:    /62   Pulse 63   Temp 97.8 °F (36.6 °C) (Temporal)   Resp 16   Ht 6' 3\" (1.905 m)   Wt 197 lb (89.4 kg)   SpO2 97%   BMI 24.62 kg/m²   Physical Exam:  General: Awake, alert, and oriented to person, place, time, and purpose, appears stated age and cooperative, No acute distress  Head: Normocephalic, atraumatic  Eyes: conjunctivae/corneas clear, EOM's intact. Mouth: Mucous membranes moist with no pharyngeal exudate or erythema  Neck: no JVD, no adenopathy, no carotid bruit, supple, symmetrical, trachea midline  Back: symmetric, ROM normal, No CVA tenderness. Lungs: clear to auscultation bilaterally without wheezes, rales, or rhonchi  Heart: regular rate and rhythm, S1, S2 normal, no murmur, click, rub or gallop  Abdomen: soft, non-tender; bowel sounds normal; no masses,  no organomegaly  Extremities: atraumatic, no cyanosis, no edema  Skin: color, texture, turgor within normal limits. No rashes or lesions or normal  Neurologic: speech appropriate, moves all 4 extremities, normal muscle strength and tone, CN 2-12 grossly intact      An electronic signature was used to authenticate this note.   --Lary Silva, DO

## 2022-09-13 NOTE — TELEPHONE ENCOUNTER
Called pt. He says when he was in the hospital (8/24/22) they were not applying it but since he has been out of the hospital he has been applying consistently.  Discharge date was 8/30/22

## 2022-09-17 NOTE — DISCHARGE SUMMARY
Hospital Medicine Discharge Summary    Patient ID: Tory Goins      Patient's PCP: Dereje Mclean DO    Admit Date: 8/24/2022     Discharge Date: 8/30/2022      Admitting Physician: Arias Tong DO     Discharge Physician: Reynold Peña MD     Discharge Diagnoses: Active Hospital Problems    Diagnosis Date Noted    Central hypothyroidism [E03.8] 08/26/2022     Priority: Medium    Secondary adrenal insufficiency ([address] [E27.49] 08/26/2022     Priority: Medium    Pericardial effusion with cardiac tamponade [I31.3, I31.4] 08/25/2022     Priority: Medium    Cardiac tamponade [I31.4] 08/25/2022     Priority: Medium    Panhypopituitarism ([address] [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 Hospital Course:   Patient with hx of anxiety, arthritis, GERD, pituitary tumor,  admitted on 8/24/2022 after presenting to the ED with abdominal pain that radiated to the back. In the Ed was noted to be hypotensive; imaging revealed large pericardial effusion. ED physician performed pericardiocentesis and a large amount of fluid aspirated; pressure briefly improved but then became hypotensive again; CTS consulted and pt underwent subxiphoid pericardial window; chest tubes have since been removed. Pt continued in shock even after window so decision was made to transfer from CVICU to MICU. BP improved with fluids and he has since transitioned off pressors. Continues on solucortef. Transitioned to oral medications and hydrocortisone wean. Discharged to home on 8/30/22 in stable condition with pcp follow up       Exam:     BP (!) 139/91   Pulse 57   Temp 98 °F (36.7 °C) (Oral)   Resp 17   Ht 6' 3\" (1.905 m)   Wt 211 lb 6.4 oz (95.9 kg)   SpO2 98%   BMI 26.42 kg/m²     General appearance: No apparent distress, appears stated age and cooperative.   HEENT: Pupils equal, round, and reactive to light. Conjunctivae/corneas clear. Neck: Supple, with full range of motion. No jugular venous distention. Trachea midline. Respiratory:  Normal respiratory effort. Clear to auscultation, bilaterally without Rales/Wheezes/Rhonchi. Cardiovascular: Regular rate and rhythm with normal S1/S2 without murmurs, rubs or gallops. Abdomen: Soft, non-tender, non-distended with normal bowel sounds. Musculoskeletal: No clubbing, cyanosis or edema bilaterally. Full range of motion without deformity. Skin: Skin color, texture, turgor normal.  No rashes or lesions. Neurologic:  Neurovascularly intact without any focal sensory/motor deficits. Cranial nerves: II-XII intact, grossly non-focal.  Psychiatric: Alert and oriented, thought content appropriate, normal insight      Consults:     IP CONSULT TO CRITICAL CARE  IP CONSULT TO CARDIOTHORACIC SURGERY  IP CONSULT TO SOCIAL WORK  IP CONSULT TO CRITICAL CARE  IP CONSULT TO ENDOCRINOLOGY    Significant Diagnostic Studies:     XR CHEST PORTABLE   Final Result   Partial clearing of right lower lobe infiltrate         XR CHEST PORTABLE   Final Result   1. Small bilateral pleural effusions, stable to slightly improved compared   08/27/2022.      2.  No other significant interval changes. XR CHEST PORTABLE   Final Result   Increasing size left effusion and new right effusion         XR CHEST PORTABLE   Final Result   Stable bilateral pleural effusions. Stable cardiac silhouette size. XR CHEST PORTABLE   Final Result   Small left pleural effusion again noted, when compared to the previous study   performed 1 day earlier. Layering right pleural effusion now noted, as   described above. Bilateral lower lung field atelectasis again present. XR CHEST PORTABLE   Final Result   No significant interval change of the past 6 hours. ET tube and orogastric tube is in good position.          XR ABDOMEN FOR NG/OG/NE TUBE PLACEMENT   Final Result Satisfactory position of NG tube. XR CHEST PORTABLE   Final Result   Stable appearance of cardiomediastinal silhouette suggesting that residual   pericardial effusion is likely present. See above. XR CHEST PORTABLE   Final Result   No appreciable pneumothorax. CTA CHEST W CONTRAST   Final Result   No evidence of pulmonary embolism. Normal caliber thoracic aorta with no evidence for intramural hematoma or   dissection. Large extends to the pericardial effusion extending into the pericardial   recess. Small bilateral pleural effusions. Normal caliber  thoracic aorta with no evidence for intramural hematoma or   dissection. Discussed on 08/25/2022 at 1:40 a.m. with Dr. Analilia Serna. CTA ABDOMEN PELVIS W CONTRAST   Final Result   Normal caliber abdominal aorta with no evidence for dissection. 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 XR CHEST PORTABLE    (Results Pending)         Disposition:  home     Discharge Instructions/Follow-up:  Keep scheduled follow up appointments. Take medications as prescribed. Code Status:  Prior     Activity: activity as tolerated    Diet: regular diet    Labs:  For convenience and continuity at follow-up the following most recent labs are provided:      CBC:    Lab Results   Component Value Date/Time    WBC 8.9 08/30/2022 05:54 AM    HGB 8.7 08/30/2022 05:54 AM    HCT 24.6 08/30/2022 05:54 AM     08/30/2022 05:54 AM       Renal:    Lab Results   Component Value Date/Time     09/06/2022 02:41 PM    K 3.9 09/06/2022 02:41 PM    K 3.8 08/25/2022 04:50 AM     09/06/2022 02:41 PM    CO2 25 09/06/2022 02:41 PM    BUN 11 09/06/2022 02:41 PM    CREATININE 1.2 09/06/2022 02:41 PM    CALCIUM 9.3 09/06/2022 02:41 PM    PHOS 1.8 08/30/2022 05:54 AM       Discharge Medications:     Discharge Medication List as of 8/30/2022  1:28 PM             Details amoxicillin-clavulanate (AUGMENTIN) 875-125 MG per tablet Take 1 tablet by mouth every 12 hours for 4 days, Disp-8 tablet, R-0Normal      mupirocin (BACTROBAN) 2 % ointment Apply topically 3 times daily. , Disp-1 g, R-0, Normal      polyethylene glycol (GLYCOLAX) 17 g packet Take 17 g by mouth daily, Disp-527 g, R-1Normal      sennosides-docusate sodium (SENOKOT-S) 8.6-50 MG tablet Take 1 tablet by mouth 2 times daily, Disp-60 tablet, R-0Normal      potassium bicarb-citric acid (EFFER-K) 20 MEQ TBEF effervescent tablet Take 1 tablet by mouth daily for 4 days, Disp-4 tablet, R-0Normal                Details   pantoprazole (PROTONIX) 40 MG tablet Take 1 tablet by mouth in the morning and 1 tablet in the evening., Disp-30 tablet, R-3Normal      hydrocortisone (CORTEF) 5 MG tablet Take 3 tablets (15 mg) in the morning and 1 tablet (5 mg) in the evening, Disp-120 tablet, R-5Normal      desmopressin (DDAVP) 0.1 MG tablet Take 1.5 tablets (150 mcg) twice a day, Disp-90 tablet, R-5Normal      levothyroxine (SYNTHROID) 175 MCG tablet Take 1 tablet by mouth Daily, Disp-30 tablet, R-5Normal                Details   famotidine (PEPCID) 40 MG tablet Take 40 mg by mouth 2 times dailyHistorical Med      NONFORMULARY Take 1 each by mouth daily [facility] HerbsHistorical Med      Testosterone 20.25 MG/1.25GM (1.62%) GEL Place 2 Pump onto the skin daily. Alternating upper armHistorical Med      loratadine (CLARITIN) 10 MG tablet Take 1 tablet by mouth daily, Disp-90 tablet, R-1Normal             Time Spent on discharge is more than 45 minutes in the examination, evaluation, counseling and review of medications and discharge plan.       Signed:    Juarez De La Rosa MD   9/17/2022

## 2022-09-26 LAB
FUNGUS (MYCOLOGY) CULTURE: NORMAL
FUNGUS (MYCOLOGY) CULTURE: NORMAL
FUNGUS STAIN: NORMAL
FUNGUS STAIN: NORMAL

## 2022-10-11 LAB
AFB CULTURE (MYCOBACTERIA): NORMAL
AFB SMEAR: NORMAL

## 2022-10-27 ENCOUNTER — APPOINTMENT (OUTPATIENT)
Dept: GENERAL RADIOLOGY | Age: 52
End: 2022-10-27
Payer: MEDICARE

## 2022-10-27 ENCOUNTER — APPOINTMENT (OUTPATIENT)
Dept: CT IMAGING | Age: 52
End: 2022-10-27
Payer: MEDICARE

## 2022-10-27 ENCOUNTER — HOSPITAL ENCOUNTER (EMERGENCY)
Age: 52
Discharge: HOME | End: 2022-10-27
Payer: MEDICARE

## 2022-10-27 VITALS
DIASTOLIC BLOOD PRESSURE: 88 MMHG | OXYGEN SATURATION: 100 % | WEIGHT: 197 LBS | TEMPERATURE: 98.1 F | SYSTOLIC BLOOD PRESSURE: 119 MMHG | BODY MASS INDEX: 24.62 KG/M2 | HEART RATE: 85 BPM

## 2022-10-27 DIAGNOSIS — G89.29 ACUTE EXACERBATION OF CHRONIC LOW BACK PAIN: Primary | ICD-10-CM

## 2022-10-27 DIAGNOSIS — M54.50 ACUTE EXACERBATION OF CHRONIC LOW BACK PAIN: Primary | ICD-10-CM

## 2022-10-27 DIAGNOSIS — J90 PLEURAL EFFUSION: ICD-10-CM

## 2022-10-27 PROCEDURE — 72131 CT LUMBAR SPINE W/O DYE: CPT

## 2022-10-27 PROCEDURE — 71046 X-RAY EXAM CHEST 2 VIEWS: CPT

## 2022-10-27 PROCEDURE — 99284 EMERGENCY DEPT VISIT MOD MDM: CPT

## 2022-10-27 RX ORDER — METHYLPREDNISOLONE 4 MG/1
TABLET ORAL
Qty: 21 TABLET | Refills: 0 | Status: SHIPPED | OUTPATIENT
Start: 2022-10-27 | End: 2022-11-02

## 2022-10-27 RX ORDER — ORPHENADRINE CITRATE 100 MG/1
100 TABLET, EXTENDED RELEASE ORAL 2 TIMES DAILY
Qty: 10 TABLET | Refills: 0 | Status: SHIPPED | OUTPATIENT
Start: 2022-10-27 | End: 2022-11-01

## 2022-10-27 RX ORDER — HYDROCODONE BITARTRATE AND ACETAMINOPHEN 5; 325 MG/1; MG/1
1 TABLET ORAL EVERY 6 HOURS PRN
Qty: 12 TABLET | Refills: 0 | Status: SHIPPED | OUTPATIENT
Start: 2022-10-27 | End: 2022-10-30
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
## 2022-10-31 ENCOUNTER — TELEPHONE (OUTPATIENT)
Dept: FAMILY MEDICINE CLINIC | Age: 52
End: 2022-10-31

## 2022-11-02 ENCOUNTER — OFFICE VISIT (OUTPATIENT)
Dept: FAMILY MEDICINE CLINIC | Age: 52
End: 2022-11-02

## 2022-11-02 VITALS
TEMPERATURE: 97.3 F | WEIGHT: 201.4 LBS | HEART RATE: 58 BPM | RESPIRATION RATE: 16 BRPM | BODY MASS INDEX: 25.04 KG/M2 | HEIGHT: 75 IN | SYSTOLIC BLOOD PRESSURE: 120 MMHG | DIASTOLIC BLOOD PRESSURE: 82 MMHG | OXYGEN SATURATION: 99 %

## 2022-11-02 DIAGNOSIS — E87.1 HYPONATREMIA: ICD-10-CM

## 2022-11-02 DIAGNOSIS — Z09 HOSPITAL DISCHARGE FOLLOW-UP: ICD-10-CM

## 2022-11-02 DIAGNOSIS — R35.0 INCREASED URINARY FREQUENCY: ICD-10-CM

## 2022-11-02 DIAGNOSIS — R79.9 ABNORMAL FINDING OF BLOOD CHEMISTRY, UNSPECIFIED: ICD-10-CM

## 2022-11-02 DIAGNOSIS — E27.49 SECONDARY ADRENAL INSUFFICIENCY (HCC): ICD-10-CM

## 2022-11-02 DIAGNOSIS — E23.0 PANHYPOPITUITARISM (HCC): ICD-10-CM

## 2022-11-02 DIAGNOSIS — Z09 HOSPITAL DISCHARGE FOLLOW-UP: Primary | ICD-10-CM

## 2022-11-02 DIAGNOSIS — I72.3 COMMON ILIAC ANEURYSM (HCC): ICD-10-CM

## 2022-11-02 LAB
ANION GAP SERPL CALCULATED.3IONS-SCNC: 13 MMOL/L (ref 7–16)
BUN BLDV-MCNC: 13 MG/DL (ref 6–20)
CALCIUM SERPL-MCNC: 9.8 MG/DL (ref 8.6–10.2)
CHLORIDE BLD-SCNC: 104 MMOL/L (ref 98–107)
CO2: 24 MMOL/L (ref 22–29)
CREAT SERPL-MCNC: 0.9 MG/DL (ref 0.7–1.2)
GFR SERPL CREATININE-BSD FRML MDRD: >60 ML/MIN/1.73
GLUCOSE BLD-MCNC: 71 MG/DL (ref 74–99)
POTASSIUM SERPL-SCNC: 4.3 MMOL/L (ref 3.5–5)
SODIUM BLD-SCNC: 141 MMOL/L (ref 132–146)

## 2022-11-02 RX ORDER — COLCHICINE 0.6 MG/1
1 TABLET ORAL 2 TIMES DAILY
COMMUNITY
Start: 2022-10-21 | End: 2022-11-04

## 2022-11-02 RX ORDER — ESOMEPRAZOLE MAGNESIUM 40 MG/1
1 CAPSULE, DELAYED RELEASE ORAL DAILY
COMMUNITY
Start: 2022-10-21

## 2022-11-02 RX ORDER — DICYCLOMINE HYDROCHLORIDE 10 MG/1
1 CAPSULE ORAL 3 TIMES DAILY
COMMUNITY
Start: 2022-09-03

## 2022-11-02 RX ORDER — ASPIRIN 81 MG/1
1 TABLET, CHEWABLE ORAL DAILY
COMMUNITY
Start: 2022-10-22

## 2022-11-02 RX ORDER — IBUPROFEN 800 MG/1
1 TABLET ORAL NIGHTLY
COMMUNITY
Start: 2022-10-21

## 2022-11-02 RX ORDER — LEVOTHYROXINE SODIUM 137 UG/1
137 TABLET ORAL DAILY
COMMUNITY

## 2022-11-02 RX ORDER — ONDANSETRON 4 MG/1
1 TABLET, FILM COATED ORAL DAILY PRN
COMMUNITY
Start: 2022-09-03
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
## 2022-11-06 ENCOUNTER — TELEPHONE (OUTPATIENT)
Dept: ENDOCRINOLOGY | Age: 52
End: 2022-11-06

## 2022-11-06 NOTE — TELEPHONE ENCOUNTER
Endocrine staff,   Please notify pt that I have reviewed your recent results. Electrolytes  result is very good. There is no need for a change in your therapy at this time.

## 2023-03-22 ENCOUNTER — OFFICE VISIT (OUTPATIENT)
Dept: FAMILY MEDICINE CLINIC | Age: 53
End: 2023-03-22

## 2023-03-22 VITALS
DIASTOLIC BLOOD PRESSURE: 66 MMHG | SYSTOLIC BLOOD PRESSURE: 104 MMHG | BODY MASS INDEX: 25.24 KG/M2 | HEART RATE: 58 BPM | HEIGHT: 75 IN | TEMPERATURE: 97.6 F | WEIGHT: 203 LBS | RESPIRATION RATE: 16 BRPM | OXYGEN SATURATION: 98 %

## 2023-03-22 DIAGNOSIS — E03.8 CENTRAL HYPOTHYROIDISM: ICD-10-CM

## 2023-03-22 DIAGNOSIS — E29.1 MALE HYPOGONADISM: ICD-10-CM

## 2023-03-22 DIAGNOSIS — E27.49 SECONDARY ADRENAL INSUFFICIENCY (HCC): ICD-10-CM

## 2023-03-22 DIAGNOSIS — E23.0 PANHYPOPITUITARISM (HCC): Primary | ICD-10-CM

## 2023-03-22 DIAGNOSIS — R36.9 PENILE DISCHARGE: ICD-10-CM

## 2023-03-22 DIAGNOSIS — Z11.3 SCREEN FOR STD (SEXUALLY TRANSMITTED DISEASE): ICD-10-CM

## 2023-03-22 DIAGNOSIS — I72.3 COMMON ILIAC ANEURYSM (HCC): ICD-10-CM

## 2023-03-22 DIAGNOSIS — K21.00 GASTROESOPHAGEAL REFLUX DISEASE WITH ESOPHAGITIS WITHOUT HEMORRHAGE: ICD-10-CM

## 2023-03-22 PROBLEM — I31.4 CARDIAC TAMPONADE: Status: RESOLVED | Noted: 2022-08-25 | Resolved: 2023-03-22

## 2023-03-22 PROBLEM — R11.0 NAUSEA: Status: RESOLVED | Noted: 2019-06-05 | Resolved: 2023-03-22

## 2023-03-22 PROBLEM — I31.39 PERICARDIAL EFFUSION WITH CARDIAC TAMPONADE: Status: RESOLVED | Noted: 2022-08-25 | Resolved: 2023-03-22

## 2023-03-22 PROBLEM — J02.0 ACUTE STREPTOCOCCAL PHARYNGITIS: Status: RESOLVED | Noted: 2019-06-05 | Resolved: 2023-03-22

## 2023-03-22 PROBLEM — I31.4 PERICARDIAL EFFUSION WITH CARDIAC TAMPONADE: Status: RESOLVED | Noted: 2022-08-25 | Resolved: 2023-03-22

## 2023-03-22 LAB
BILIRUBIN, POC: NEGATIVE
BLOOD URINE, POC: ABNORMAL
CLARITY, POC: CLEAR
COLOR, POC: YELLOW
GLUCOSE URINE, POC: NEGATIVE
KETONES, POC: NEGATIVE
LEUKOCYTE EST, POC: NEGATIVE
NITRITE, POC: NEGATIVE
PH, POC: 5.5
PROTEIN, POC: NEGATIVE
SPECIFIC GRAVITY, POC: 1.02
UROBILINOGEN, POC: 0.2

## 2023-03-22 RX ORDER — LEVOTHYROXINE SODIUM 137 UG/1
137 TABLET ORAL DAILY
Qty: 30 TABLET | Refills: 5 | Status: SHIPPED | OUTPATIENT
Start: 2023-03-22

## 2023-03-22 RX ORDER — HYDROCORTISONE 5 MG/1
TABLET ORAL
Qty: 120 TABLET | Refills: 5 | Status: SHIPPED | OUTPATIENT
Start: 2023-03-22

## 2023-03-22 RX ORDER — TESTOSTERONE 20.25 MG/1.25G
GEL TOPICAL
Qty: 1.25 G | Refills: 5 | Status: CANCELLED | OUTPATIENT
Start: 2023-03-22 | End: 2023-10-05

## 2023-03-22 RX ORDER — DICYCLOMINE HYDROCHLORIDE 10 MG/1
10 CAPSULE ORAL 3 TIMES DAILY
Qty: 90 CAPSULE | Refills: 5 | Status: SHIPPED | OUTPATIENT
Start: 2023-03-22

## 2023-03-22 RX ORDER — COLCHICINE 0.6 MG/1
0.6 TABLET ORAL 2 TIMES DAILY
Qty: 28 TABLET | Refills: 0 | Status: SHIPPED | OUTPATIENT
Start: 2023-03-22 | End: 2023-04-05

## 2023-03-22 SDOH — ECONOMIC STABILITY: INCOME INSECURITY: HOW HARD IS IT FOR YOU TO PAY FOR THE VERY BASICS LIKE FOOD, HOUSING, MEDICAL CARE, AND HEATING?: NOT HARD AT ALL

## 2023-03-22 SDOH — ECONOMIC STABILITY: FOOD INSECURITY: WITHIN THE PAST 12 MONTHS, THE FOOD YOU BOUGHT JUST DIDN'T LAST AND YOU DIDN'T HAVE MONEY TO GET MORE.: NEVER TRUE

## 2023-03-22 SDOH — ECONOMIC STABILITY: FOOD INSECURITY: WITHIN THE PAST 12 MONTHS, YOU WORRIED THAT YOUR FOOD WOULD RUN OUT BEFORE YOU GOT MONEY TO BUY MORE.: NEVER TRUE

## 2023-03-22 SDOH — ECONOMIC STABILITY: HOUSING INSECURITY
IN THE LAST 12 MONTHS, WAS THERE A TIME WHEN YOU DID NOT HAVE A STEADY PLACE TO SLEEP OR SLEPT IN A SHELTER (INCLUDING NOW)?: NO

## 2023-03-22 ASSESSMENT — PATIENT HEALTH QUESTIONNAIRE - PHQ9
SUM OF ALL RESPONSES TO PHQ QUESTIONS 1-9: 0
1. LITTLE INTEREST OR PLEASURE IN DOING THINGS: 0
SUM OF ALL RESPONSES TO PHQ9 QUESTIONS 1 & 2: 0
SUM OF ALL RESPONSES TO PHQ QUESTIONS 1-9: 0
SUM OF ALL RESPONSES TO PHQ QUESTIONS 1-9: 0
2. FEELING DOWN, DEPRESSED OR HOPELESS: 0
SUM OF ALL RESPONSES TO PHQ QUESTIONS 1-9: 0

## 2023-03-22 NOTE — PROGRESS NOTES
symmetrical, trachea midline  Back: symmetric, ROM normal, No CVA tenderness. Lungs: clear to auscultation bilaterally without wheezes, rales, or rhonchi  Heart: regular rate and rhythm, S1, S2 normal, no murmur, click, rub or gallop  Abdomen: soft, non-tender; bowel sounds normal; no masses,  no organomegaly  Extremities: atraumatic, no cyanosis, no edema  Skin: color, texture, turgor within normal limits  Neurologic:speech appropriate, moves all 4 extremities, normal muscle strength and tone, CN 2-12 grossly intact    Labs:  Lab Results   Component Value Date    WBC 8.9 08/30/2022    HGB 8.7 (L) 08/30/2022    HCT 24.6 (L) 08/30/2022     08/30/2022     11/02/2022    K 4.3 11/02/2022     11/02/2022    CREATININE 0.9 11/02/2022    BUN 13 11/02/2022    CO2 24 11/02/2022    GLUCOSE 71 (L) 11/02/2022    ALT 13 08/30/2022    AST 35 08/30/2022    INR 1.4 08/25/2022     Lab Results   Component Value Date    TSH 0.036 (L) 08/26/2022     Lab Results   Component Value Date    TRIG 351 (H) 02/18/2014    TRIG 165 (H) 07/12/2013    TRIG 680 (H) 03/04/2013     Lab Results   Component Value Date    HDL 50 02/18/2014    HDL 34.0 (A) 07/12/2013    HDL 38.0 (A) 03/04/2013     Lab Results   Component Value Date    LDLCALC 197 (H) 02/18/2014    LDLCALC 85 07/12/2013    LDLCALC - (AA) 03/04/2013     Lab Results   Component Value Date    LABA1C 5.6 08/26/2022     Lab Results   Component Value Date    INR 1.4 08/25/2022    INR 1.1 07/12/2022    INR 1.1 12/01/2018    PROTIME 15.2 (H) 08/25/2022    PROTIME 12.0 07/12/2022    PROTIME 12.3 12/01/2018      *All recent labs were reviewed. Please see electronic chart for a more comprehensive set of labs    Radiology:  No results found. Assessment and Plan     Patient is a 46 y.o. male who presented to the office for follow up.    Full problem list is as follows:  Patient Active Problem List   Diagnosis    Chronic back pain    Anxiety    Erectile dysfunction    Common iliac

## 2023-03-27 LAB
CHLAMYDIA DNA UR QL NAA+PROBE: NEGATIVE
CULTURE, TRICHOMONAS: NORMAL
N GONORRHOEA DNA UR QL NAA+PROBE: NEGATIVE
SPECIMEN SOURCE: NORMAL

## 2023-04-06 DIAGNOSIS — E23.0 PANHYPOPITUITARISM (HCC): ICD-10-CM

## 2023-04-06 DIAGNOSIS — E29.1 MALE HYPOGONADISM: ICD-10-CM

## 2023-04-06 RX ORDER — TESTOSTERONE 20.25 MG/1.25G
GEL TOPICAL
Qty: 1.25 G | Refills: 2 | Status: SHIPPED | OUTPATIENT
Start: 2023-04-06 | End: 2023-10-20

## 2023-04-17 ENCOUNTER — HOSPITAL ENCOUNTER (OUTPATIENT)
Age: 53
Discharge: HOME | End: 2023-04-17
Payer: MEDICARE

## 2023-04-17 DIAGNOSIS — R36.9 PENILE DISCHARGE: ICD-10-CM

## 2023-04-17 DIAGNOSIS — Z11.3 SCREEN FOR STD (SEXUALLY TRANSMITTED DISEASE): ICD-10-CM

## 2023-04-17 PROCEDURE — 86694 HERPES SIMPLEX NES ANTBDY: CPT

## 2023-04-17 PROCEDURE — 86803 HEPATITIS C AB TEST: CPT

## 2023-04-17 PROCEDURE — 36415 COLL VENOUS BLD VENIPUNCTURE: CPT

## 2023-04-17 PROCEDURE — 86703 HIV-1/HIV-2 1 RESULT ANTBDY: CPT

## 2023-04-17 PROCEDURE — 86592 SYPHILIS TEST NON-TREP QUAL: CPT

## 2023-04-18 LAB
HCV AB SERPL QL IA: NORMAL
HIV1+2 AB SERPL QL IA: NORMAL
RPR SER QL: NORMAL

## 2023-04-20 LAB — HSV1+2 IGM SER IA-ACNC: 1.19 IV

## 2023-05-29 ENCOUNTER — APPOINTMENT (OUTPATIENT)
Dept: GENERAL RADIOLOGY | Age: 53
DRG: 379 | End: 2023-05-29
Payer: MEDICARE

## 2023-05-29 ENCOUNTER — APPOINTMENT (OUTPATIENT)
Dept: CT IMAGING | Age: 53
DRG: 379 | End: 2023-05-29
Payer: MEDICARE

## 2023-05-29 ENCOUNTER — HOSPITAL ENCOUNTER (INPATIENT)
Age: 53
LOS: 1 days | Discharge: HOME | DRG: 379 | End: 2023-05-30
Attending: EMERGENCY MEDICINE | Admitting: INTERNAL MEDICINE
Payer: MEDICARE

## 2023-05-29 ENCOUNTER — APPOINTMENT (OUTPATIENT)
Dept: CT IMAGING | Age: 53
DRG: 379 | End: 2023-05-29
Attending: EMERGENCY MEDICINE
Payer: MEDICARE

## 2023-05-29 DIAGNOSIS — E27.40 ACUTE ADRENAL INSUFFICIENCY (HCC): ICD-10-CM

## 2023-05-29 DIAGNOSIS — K20.90 ESOPHAGITIS: ICD-10-CM

## 2023-05-29 DIAGNOSIS — R11.2 NAUSEA AND VOMITING, UNSPECIFIED VOMITING TYPE: Primary | ICD-10-CM

## 2023-05-29 DIAGNOSIS — R10.13 ABDOMINAL PAIN, EPIGASTRIC: ICD-10-CM

## 2023-05-29 PROBLEM — R10.9 ABDOMINAL PAIN: Status: ACTIVE | Noted: 2023-05-29

## 2023-05-29 LAB
ALBUMIN SERPL-MCNC: 4 G/DL (ref 3.5–5.2)
ALP SERPL-CCNC: 52 U/L (ref 40–129)
ALT SERPL-CCNC: 10 U/L (ref 0–40)
ANION GAP SERPL CALCULATED.3IONS-SCNC: 15 MMOL/L (ref 7–16)
AST SERPL-CCNC: 12 U/L (ref 0–39)
BASOPHILS # BLD: 0.03 E9/L (ref 0–0.2)
BASOPHILS NFR BLD: 0.4 % (ref 0–2)
BILIRUB SERPL-MCNC: <0.2 MG/DL (ref 0–1.2)
BUN SERPL-MCNC: 6 MG/DL (ref 6–20)
CALCIUM SERPL-MCNC: 9.8 MG/DL (ref 8.6–10.2)
CHLORIDE SERPL-SCNC: 104 MMOL/L (ref 98–107)
CO2 SERPL-SCNC: 25 MMOL/L (ref 22–29)
CORTIS SERPL-MCNC: 0.64 MCG/DL (ref 2.68–18.4)
CREAT SERPL-MCNC: 0.8 MG/DL (ref 0.7–1.2)
EOSINOPHIL # BLD: 0.12 E9/L (ref 0.05–0.5)
EOSINOPHIL NFR BLD: 1.5 % (ref 0–6)
ERYTHROCYTE [DISTWIDTH] IN BLOOD BY AUTOMATED COUNT: 13.6 FL (ref 11.5–15)
GLUCOSE SERPL-MCNC: 94 MG/DL (ref 74–99)
HCT VFR BLD AUTO: 35 % (ref 37–54)
HGB BLD-MCNC: 11.5 G/DL (ref 12.5–16.5)
IMM GRANULOCYTES # BLD: 0.02 E9/L
IMM GRANULOCYTES NFR BLD: 0.3 % (ref 0–5)
LACTATE BLDV-SCNC: 2.6 MMOL/L (ref 0.5–1.9)
LIPASE: 20 U/L (ref 13–60)
LYMPHOCYTES # BLD: 4.6 E9/L (ref 1.5–4)
LYMPHOCYTES NFR BLD: 58.6 % (ref 20–42)
MAGNESIUM SERPL-MCNC: 1.9 MG/DL (ref 1.6–2.6)
MCH RBC QN AUTO: 28.8 PG (ref 26–35)
MCHC RBC AUTO-ENTMCNC: 32.9 % (ref 32–34.5)
MCV RBC AUTO: 87.5 FL (ref 80–99.9)
MONOCYTES # BLD: 0.56 E9/L (ref 0.1–0.95)
MONOCYTES NFR BLD: 7.1 % (ref 2–12)
NEUTROPHILS # BLD: 2.52 E9/L (ref 1.8–7.3)
NEUTS SEG NFR BLD: 32.1 % (ref 43–80)
PLATELET # BLD AUTO: 254 E9/L (ref 130–450)
PMV BLD AUTO: 9.5 FL (ref 7–12)
POTASSIUM SERPL-SCNC: 3.5 MMOL/L (ref 3.5–5)
PROT SERPL-MCNC: 6.9 G/DL (ref 6.4–8.3)
RBC # BLD AUTO: 4 E12/L (ref 3.8–5.8)
SODIUM SERPL-SCNC: 144 MMOL/L (ref 132–146)
TROPONIN, HIGH SENSITIVITY: 7 NG/L (ref 0–11)
TROPONIN, HIGH SENSITIVITY: 8 NG/L (ref 0–11)
WBC # BLD: 7.9 E9/L (ref 4.5–11.5)

## 2023-05-29 PROCEDURE — 2580000003 HC RX 258: Performed by: EMERGENCY MEDICINE

## 2023-05-29 PROCEDURE — 6370000000 HC RX 637 (ALT 250 FOR IP): Performed by: INTERNAL MEDICINE

## 2023-05-29 PROCEDURE — 96374 THER/PROPH/DIAG INJ IV PUSH: CPT

## 2023-05-29 PROCEDURE — 80053 COMPREHEN METABOLIC PANEL: CPT

## 2023-05-29 PROCEDURE — 82533 TOTAL CORTISOL: CPT

## 2023-05-29 PROCEDURE — 1200000000 HC SEMI PRIVATE

## 2023-05-29 PROCEDURE — C9113 INJ PANTOPRAZOLE SODIUM, VIA: HCPCS | Performed by: EMERGENCY MEDICINE

## 2023-05-29 PROCEDURE — 71260 CT THORAX DX C+: CPT

## 2023-05-29 PROCEDURE — 99285 EMERGENCY DEPT VISIT HI MDM: CPT

## 2023-05-29 PROCEDURE — 99222 1ST HOSP IP/OBS MODERATE 55: CPT | Performed by: SURGERY

## 2023-05-29 PROCEDURE — 83690 ASSAY OF LIPASE: CPT

## 2023-05-29 PROCEDURE — 96361 HYDRATE IV INFUSION ADD-ON: CPT

## 2023-05-29 PROCEDURE — 83605 ASSAY OF LACTIC ACID: CPT

## 2023-05-29 PROCEDURE — 71045 X-RAY EXAM CHEST 1 VIEW: CPT

## 2023-05-29 PROCEDURE — 74177 CT ABD & PELVIS W/CONTRAST: CPT

## 2023-05-29 PROCEDURE — 93005 ELECTROCARDIOGRAM TRACING: CPT | Performed by: EMERGENCY MEDICINE

## 2023-05-29 PROCEDURE — 6360000004 HC RX CONTRAST MEDICATION: Performed by: RADIOLOGY

## 2023-05-29 PROCEDURE — 84484 ASSAY OF TROPONIN QUANT: CPT

## 2023-05-29 PROCEDURE — 83735 ASSAY OF MAGNESIUM: CPT

## 2023-05-29 PROCEDURE — 6360000002 HC RX W HCPCS: Performed by: EMERGENCY MEDICINE

## 2023-05-29 PROCEDURE — 96375 TX/PRO/DX INJ NEW DRUG ADDON: CPT

## 2023-05-29 PROCEDURE — 85025 COMPLETE CBC W/AUTO DIFF WBC: CPT

## 2023-05-29 PROCEDURE — G0378 HOSPITAL OBSERVATION PER HR: HCPCS

## 2023-05-29 PROCEDURE — 6370000000 HC RX 637 (ALT 250 FOR IP): Performed by: STUDENT IN AN ORGANIZED HEALTH CARE EDUCATION/TRAINING PROGRAM

## 2023-05-29 RX ORDER — LEVOTHYROXINE SODIUM 137 UG/1
137 TABLET ORAL DAILY
Status: DISCONTINUED | OUTPATIENT
Start: 2023-05-30 | End: 2023-05-30 | Stop reason: HOSPADM

## 2023-05-29 RX ORDER — SODIUM CHLORIDE 0.9 % (FLUSH) 0.9 %
10 SYRINGE (ML) INJECTION EVERY 12 HOURS SCHEDULED
Status: DISCONTINUED | OUTPATIENT
Start: 2023-05-29 | End: 2023-05-30 | Stop reason: HOSPADM

## 2023-05-29 RX ORDER — ACETAMINOPHEN 325 MG/1
650 TABLET ORAL EVERY 6 HOURS PRN
Status: DISCONTINUED | OUTPATIENT
Start: 2023-05-29 | End: 2023-05-30 | Stop reason: HOSPADM

## 2023-05-29 RX ORDER — LORAZEPAM 1 MG/1
4 TABLET ORAL
Status: DISCONTINUED | OUTPATIENT
Start: 2023-05-29 | End: 2023-05-30 | Stop reason: HOSPADM

## 2023-05-29 RX ORDER — POLYETHYLENE GLYCOL 3350 17 G/17G
17 POWDER, FOR SOLUTION ORAL DAILY PRN
Status: DISCONTINUED | OUTPATIENT
Start: 2023-05-29 | End: 2023-05-30 | Stop reason: HOSPADM

## 2023-05-29 RX ORDER — LANOLIN ALCOHOL/MO/W.PET/CERES
100 CREAM (GRAM) TOPICAL DAILY
Status: DISCONTINUED | OUTPATIENT
Start: 2023-05-29 | End: 2023-05-30 | Stop reason: HOSPADM

## 2023-05-29 RX ORDER — KETOROLAC TROMETHAMINE 30 MG/ML
15 INJECTION, SOLUTION INTRAMUSCULAR; INTRAVENOUS ONCE
Status: DISCONTINUED | OUTPATIENT
Start: 2023-05-29 | End: 2023-05-29

## 2023-05-29 RX ORDER — SODIUM CHLORIDE 0.9 % (FLUSH) 0.9 %
10 SYRINGE (ML) INJECTION PRN
Status: DISCONTINUED | OUTPATIENT
Start: 2023-05-29 | End: 2023-05-30 | Stop reason: HOSPADM

## 2023-05-29 RX ORDER — SODIUM CHLORIDE 9 MG/ML
INJECTION, SOLUTION INTRAVENOUS PRN
Status: DISCONTINUED | OUTPATIENT
Start: 2023-05-29 | End: 2023-05-30 | Stop reason: HOSPADM

## 2023-05-29 RX ORDER — LORAZEPAM 1 MG/1
2 TABLET ORAL
Status: DISCONTINUED | OUTPATIENT
Start: 2023-05-29 | End: 2023-05-30 | Stop reason: HOSPADM

## 2023-05-29 RX ORDER — ONDANSETRON 2 MG/ML
4 INJECTION INTRAMUSCULAR; INTRAVENOUS EVERY 6 HOURS PRN
Status: DISCONTINUED | OUTPATIENT
Start: 2023-05-29 | End: 2023-05-30 | Stop reason: HOSPADM

## 2023-05-29 RX ORDER — LORAZEPAM 1 MG/1
1 TABLET ORAL
Status: DISCONTINUED | OUTPATIENT
Start: 2023-05-29 | End: 2023-05-30 | Stop reason: HOSPADM

## 2023-05-29 RX ORDER — LORAZEPAM 2 MG/ML
1 INJECTION INTRAMUSCULAR
Status: DISCONTINUED | OUTPATIENT
Start: 2023-05-29 | End: 2023-05-30 | Stop reason: HOSPADM

## 2023-05-29 RX ORDER — POTASSIUM CHLORIDE 7.45 MG/ML
10 INJECTION INTRAVENOUS PRN
Status: DISCONTINUED | OUTPATIENT
Start: 2023-05-29 | End: 2023-05-30

## 2023-05-29 RX ORDER — LORAZEPAM 2 MG/ML
2 INJECTION INTRAMUSCULAR
Status: DISCONTINUED | OUTPATIENT
Start: 2023-05-29 | End: 2023-05-30 | Stop reason: HOSPADM

## 2023-05-29 RX ORDER — LORAZEPAM 1 MG/1
3 TABLET ORAL
Status: DISCONTINUED | OUTPATIENT
Start: 2023-05-29 | End: 2023-05-30 | Stop reason: HOSPADM

## 2023-05-29 RX ORDER — MORPHINE SULFATE 4 MG/ML
4 INJECTION, SOLUTION INTRAMUSCULAR; INTRAVENOUS ONCE
Status: COMPLETED | OUTPATIENT
Start: 2023-05-29 | End: 2023-05-29

## 2023-05-29 RX ORDER — ACETAMINOPHEN 650 MG/1
650 SUPPOSITORY RECTAL EVERY 6 HOURS PRN
Status: DISCONTINUED | OUTPATIENT
Start: 2023-05-29 | End: 2023-05-30 | Stop reason: HOSPADM

## 2023-05-29 RX ORDER — LORAZEPAM 2 MG/ML
3 INJECTION INTRAMUSCULAR
Status: DISCONTINUED | OUTPATIENT
Start: 2023-05-29 | End: 2023-05-30 | Stop reason: HOSPADM

## 2023-05-29 RX ORDER — PANTOPRAZOLE SODIUM 40 MG/10ML
40 INJECTION, POWDER, LYOPHILIZED, FOR SOLUTION INTRAVENOUS ONCE
Status: COMPLETED | OUTPATIENT
Start: 2023-05-29 | End: 2023-05-29

## 2023-05-29 RX ORDER — MAGNESIUM SULFATE IN WATER 40 MG/ML
2000 INJECTION, SOLUTION INTRAVENOUS PRN
Status: DISCONTINUED | OUTPATIENT
Start: 2023-05-29 | End: 2023-05-30 | Stop reason: HOSPADM

## 2023-05-29 RX ORDER — ONDANSETRON 2 MG/ML
4 INJECTION INTRAMUSCULAR; INTRAVENOUS ONCE
Status: DISCONTINUED | OUTPATIENT
Start: 2023-05-29 | End: 2023-05-29

## 2023-05-29 RX ORDER — 0.9 % SODIUM CHLORIDE 0.9 %
1000 INTRAVENOUS SOLUTION INTRAVENOUS ONCE
Status: COMPLETED | OUTPATIENT
Start: 2023-05-29 | End: 2023-05-29

## 2023-05-29 RX ORDER — LORAZEPAM 2 MG/ML
4 INJECTION INTRAMUSCULAR
Status: DISCONTINUED | OUTPATIENT
Start: 2023-05-29 | End: 2023-05-30 | Stop reason: HOSPADM

## 2023-05-29 RX ORDER — PROMETHAZINE HYDROCHLORIDE 12.5 MG/1
12.5 TABLET ORAL EVERY 6 HOURS PRN
Status: DISCONTINUED | OUTPATIENT
Start: 2023-05-29 | End: 2023-05-30 | Stop reason: HOSPADM

## 2023-05-29 RX ORDER — ENOXAPARIN SODIUM 100 MG/ML
40 INJECTION SUBCUTANEOUS DAILY
Status: DISCONTINUED | OUTPATIENT
Start: 2023-05-29 | End: 2023-05-30 | Stop reason: HOSPADM

## 2023-05-29 RX ORDER — SUCRALFATE 1 G/1
1 TABLET ORAL EVERY 6 HOURS SCHEDULED
Status: DISCONTINUED | OUTPATIENT
Start: 2023-05-29 | End: 2023-05-30 | Stop reason: HOSPADM

## 2023-05-29 RX ORDER — PROCHLORPERAZINE EDISYLATE 5 MG/ML
10 INJECTION INTRAMUSCULAR; INTRAVENOUS ONCE
Status: COMPLETED | OUTPATIENT
Start: 2023-05-29 | End: 2023-05-29

## 2023-05-29 RX ORDER — POTASSIUM CHLORIDE 20 MEQ/1
40 TABLET, EXTENDED RELEASE ORAL PRN
Status: DISCONTINUED | OUTPATIENT
Start: 2023-05-29 | End: 2023-05-30

## 2023-05-29 RX ORDER — HYDROCORTISONE 5 MG/1
15 TABLET ORAL DAILY
Status: DISCONTINUED | OUTPATIENT
Start: 2023-05-29 | End: 2023-05-30 | Stop reason: HOSPADM

## 2023-05-29 RX ADMIN — PANTOPRAZOLE SODIUM 40 MG: 40 INJECTION, POWDER, FOR SOLUTION INTRAVENOUS at 12:35

## 2023-05-29 RX ADMIN — IOPAMIDOL 12 ML: 755 INJECTION, SOLUTION INTRAVENOUS at 15:07

## 2023-05-29 RX ADMIN — SUCRALFATE 1 G: 1 TABLET ORAL at 18:43

## 2023-05-29 RX ADMIN — IOPAMIDOL 75 ML: 755 INJECTION, SOLUTION INTRAVENOUS at 15:07

## 2023-05-29 RX ADMIN — SODIUM CHLORIDE 1000 ML: 9 INJECTION, SOLUTION INTRAVENOUS at 13:20

## 2023-05-29 RX ADMIN — HYDROCORTISONE SODIUM SUCCINATE 100 MG: 100 INJECTION, POWDER, FOR SOLUTION INTRAMUSCULAR; INTRAVENOUS at 12:35

## 2023-05-29 RX ADMIN — MORPHINE SULFATE 4 MG: 4 INJECTION, SOLUTION INTRAMUSCULAR; INTRAVENOUS at 12:35

## 2023-05-29 RX ADMIN — Medication 100 MG: at 20:44

## 2023-05-29 RX ADMIN — PROCHLORPERAZINE EDISYLATE 10 MG: 5 INJECTION INTRAMUSCULAR; INTRAVENOUS at 12:35

## 2023-05-29 RX ADMIN — SODIUM CHLORIDE 1000 ML: 9 INJECTION, SOLUTION INTRAVENOUS at 15:00

## 2023-05-29 NOTE — CONSULTS
GENERAL SURGERY  CONSULT NOTE  5/29/2023    Chief Complaint   Patient presents with    Abdominal Pain     Abdominal pain x 1 day. +n/v. +chills. States drank \"four shots\" last night. States blood in vomit. Mildly uncooperative with questioning in triage. A&O x 4.     Emesis       VIVIANE Rosales is a 46 y.o. male for whom general surgery was consulted for the evaluation of abdominal pain and abnormal CT scan showing esophagitis. Patient has had abdominal pain for many years. He decided come in for evaluation after having worsening pain in his upper abdomen and after vomiting 4 times with some blood in his last episode of emesis. He states that his pain is in the left upper quadrant and does not radiate anywhere. For his abdominal pain in the past he has had different test performed and is unsure of the cause. He states that he is abdominal pain is not made worse with certain foods. He states that his abdominal pain is worse in the morning and is made slightly better with the eating. He rates pain as a 7-8 out of 10 in intensity and is a dull character. He states that yesterday he had 4 shots of tequila. He no longer drinks alcohol on a regular basis but drank in the past.    In 2018 he presented after an episode of hematemesis and underwent esophagogastroduodenoscopy which revealed an ulcerated lesion of the cardia as well as gastritis. At that time he was given a PPI and Carafate and was discharged. He states that he does not take his PPI as prescribed and that the Carafate causes him more abdominal pain. He denies fever, chills, exposure to sick contacts, bloody or dark stools, steatorrhea, jaundice, or symptoms consistent with biliary colic. He denies any liver disease. His vital signs are unremarkable. On evaluation in the emergency department he underwent CT scanning as well as laboratory testing. CT scans are remarkable for the above.   He has mild tenderness to palpation in his left
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
## 2023-05-30 ENCOUNTER — ANESTHESIA (OUTPATIENT)
Dept: ENDOSCOPY | Age: 53
DRG: 379 | End: 2023-05-30
Payer: MEDICARE

## 2023-05-30 ENCOUNTER — ANESTHESIA EVENT (OUTPATIENT)
Dept: ENDOSCOPY | Age: 53
DRG: 379 | End: 2023-05-30
Payer: MEDICARE

## 2023-05-30 VITALS
BODY MASS INDEX: 24.87 KG/M2 | DIASTOLIC BLOOD PRESSURE: 89 MMHG | HEART RATE: 46 BPM | OXYGEN SATURATION: 100 % | WEIGHT: 200 LBS | HEIGHT: 75 IN | TEMPERATURE: 97 F | SYSTOLIC BLOOD PRESSURE: 120 MMHG | RESPIRATION RATE: 18 BRPM

## 2023-05-30 LAB
ANION GAP SERPL CALCULATED.3IONS-SCNC: 10 MMOL/L (ref 7–16)
ANISOCYTOSIS: ABNORMAL
BASOPHILS # BLD: 0 E9/L (ref 0–0.2)
BASOPHILS NFR BLD: 0.4 % (ref 0–2)
BUN SERPL-MCNC: 6 MG/DL (ref 6–20)
CALCIUM SERPL-MCNC: 9 MG/DL (ref 8.6–10.2)
CHLORIDE SERPL-SCNC: 105 MMOL/L (ref 98–107)
CO2 SERPL-SCNC: 24 MMOL/L (ref 22–29)
CREAT SERPL-MCNC: 0.8 MG/DL (ref 0.7–1.2)
EKG ATRIAL RATE: 51 BPM
EKG ATRIAL RATE: 54 BPM
EKG P AXIS: 55 DEGREES
EKG P AXIS: 55 DEGREES
EKG P-R INTERVAL: 178 MS
EKG P-R INTERVAL: 202 MS
EKG Q-T INTERVAL: 512 MS
EKG Q-T INTERVAL: 518 MS
EKG QRS DURATION: 152 MS
EKG QRS DURATION: 154 MS
EKG QTC CALCULATION (BAZETT): 471 MS
EKG QTC CALCULATION (BAZETT): 491 MS
EKG R AXIS: -27 DEGREES
EKG R AXIS: -52 DEGREES
EKG T AXIS: -43 DEGREES
EKG T AXIS: 34 DEGREES
EKG VENTRICULAR RATE: 51 BPM
EKG VENTRICULAR RATE: 54 BPM
EOSINOPHIL # BLD: 0.25 E9/L (ref 0.05–0.5)
EOSINOPHIL NFR BLD: 2.6 % (ref 0–6)
ERYTHROCYTE [DISTWIDTH] IN BLOOD BY AUTOMATED COUNT: 13.7 FL (ref 11.5–15)
GLUCOSE SERPL-MCNC: 80 MG/DL (ref 74–99)
HCT VFR BLD AUTO: 33.1 % (ref 37–54)
HGB BLD-MCNC: 10.6 G/DL (ref 12.5–16.5)
LYMPHOCYTES # BLD: 4.12 E9/L (ref 1.5–4)
LYMPHOCYTES NFR BLD: 41.7 % (ref 20–42)
MCH RBC QN AUTO: 28.6 PG (ref 26–35)
MCHC RBC AUTO-ENTMCNC: 32 % (ref 32–34.5)
MCV RBC AUTO: 89.5 FL (ref 80–99.9)
MONOCYTES # BLD: 0.29 E9/L (ref 0.1–0.95)
MONOCYTES NFR BLD: 2.6 % (ref 2–12)
NEUTROPHILS # BLD: 5.19 E9/L (ref 1.8–7.3)
NEUTS SEG NFR BLD: 53.1 % (ref 43–80)
OVALOCYTES: ABNORMAL
PLATELET # BLD AUTO: 242 E9/L (ref 130–450)
PMV BLD AUTO: 9.6 FL (ref 7–12)
POIKILOCYTES: ABNORMAL
POTASSIUM SERPL-SCNC: 3.8 MMOL/L (ref 3.5–5)
RBC # BLD AUTO: 3.7 E12/L (ref 3.8–5.8)
SODIUM SERPL-SCNC: 139 MMOL/L (ref 132–146)
WBC # BLD: 9.8 E9/L (ref 4.5–11.5)

## 2023-05-30 PROCEDURE — G0378 HOSPITAL OBSERVATION PER HR: HCPCS

## 2023-05-30 PROCEDURE — 2709999900 HC NON-CHARGEABLE SUPPLY: Performed by: SURGERY

## 2023-05-30 PROCEDURE — 93010 ELECTROCARDIOGRAM REPORT: CPT | Performed by: INTERNAL MEDICINE

## 2023-05-30 PROCEDURE — 3609017100 HC EGD: Performed by: SURGERY

## 2023-05-30 PROCEDURE — 43235 EGD DIAGNOSTIC BRUSH WASH: CPT | Performed by: SURGERY

## 2023-05-30 PROCEDURE — 3700000001 HC ADD 15 MINUTES (ANESTHESIA): Performed by: SURGERY

## 2023-05-30 PROCEDURE — 6370000000 HC RX 637 (ALT 250 FOR IP): Performed by: STUDENT IN AN ORGANIZED HEALTH CARE EDUCATION/TRAINING PROGRAM

## 2023-05-30 PROCEDURE — 3700000000 HC ANESTHESIA ATTENDED CARE: Performed by: SURGERY

## 2023-05-30 PROCEDURE — 6360000002 HC RX W HCPCS: Performed by: INTERNAL MEDICINE

## 2023-05-30 PROCEDURE — 93005 ELECTROCARDIOGRAM TRACING: CPT | Performed by: STUDENT IN AN ORGANIZED HEALTH CARE EDUCATION/TRAINING PROGRAM

## 2023-05-30 PROCEDURE — 2580000003 HC RX 258: Performed by: SURGERY

## 2023-05-30 PROCEDURE — 85025 COMPLETE CBC W/AUTO DIFF WBC: CPT

## 2023-05-30 PROCEDURE — 7100000001 HC PACU RECOVERY - ADDTL 15 MIN: Performed by: SURGERY

## 2023-05-30 PROCEDURE — 6360000002 HC RX W HCPCS: Performed by: NURSE ANESTHETIST, CERTIFIED REGISTERED

## 2023-05-30 PROCEDURE — 0DJ08ZZ INSPECTION OF UPPER INTESTINAL TRACT, VIA NATURAL OR ARTIFICIAL OPENING ENDOSCOPIC: ICD-10-PCS | Performed by: SURGERY

## 2023-05-30 PROCEDURE — 36415 COLL VENOUS BLD VENIPUNCTURE: CPT

## 2023-05-30 PROCEDURE — 96375 TX/PRO/DX INJ NEW DRUG ADDON: CPT

## 2023-05-30 PROCEDURE — 80048 BASIC METABOLIC PNL TOTAL CA: CPT

## 2023-05-30 PROCEDURE — 2580000003 HC RX 258: Performed by: NURSE ANESTHETIST, CERTIFIED REGISTERED

## 2023-05-30 PROCEDURE — 7100000000 HC PACU RECOVERY - FIRST 15 MIN: Performed by: SURGERY

## 2023-05-30 RX ORDER — HYDROCORTISONE 5 MG/1
5 TABLET ORAL EVERY EVENING
Status: DISCONTINUED | OUTPATIENT
Start: 2023-05-30 | End: 2023-05-30 | Stop reason: HOSPADM

## 2023-05-30 RX ORDER — POTASSIUM CHLORIDE 20 MEQ/1
40 TABLET, EXTENDED RELEASE ORAL ONCE
Status: COMPLETED | OUTPATIENT
Start: 2023-05-30 | End: 2023-05-30

## 2023-05-30 RX ORDER — PANTOPRAZOLE SODIUM 40 MG/1
40 TABLET, DELAYED RELEASE ORAL
Qty: 60 TABLET | Refills: 0 | Status: SHIPPED | OUTPATIENT
Start: 2023-05-30 | End: 2023-06-29

## 2023-05-30 RX ORDER — SODIUM CHLORIDE 9 MG/ML
INJECTION, SOLUTION INTRAVENOUS CONTINUOUS PRN
Status: DISCONTINUED | OUTPATIENT
Start: 2023-05-30 | End: 2023-05-30 | Stop reason: SDUPTHER

## 2023-05-30 RX ORDER — PROPOFOL 10 MG/ML
INJECTION, EMULSION INTRAVENOUS PRN
Status: DISCONTINUED | OUTPATIENT
Start: 2023-05-30 | End: 2023-05-30 | Stop reason: SDUPTHER

## 2023-05-30 RX ORDER — PANTOPRAZOLE SODIUM 40 MG/1
40 TABLET, DELAYED RELEASE ORAL
Qty: 60 TABLET | Refills: 0 | Status: SHIPPED | OUTPATIENT
Start: 2023-05-30 | End: 2023-05-30 | Stop reason: SDUPTHER

## 2023-05-30 RX ADMIN — PROPOFOL 200 MG: 10 INJECTION, EMULSION INTRAVENOUS at 12:37

## 2023-05-30 RX ADMIN — Medication 10 ML: at 09:00

## 2023-05-30 RX ADMIN — POTASSIUM CHLORIDE 40 MEQ: 1500 TABLET, EXTENDED RELEASE ORAL at 15:10

## 2023-05-30 RX ADMIN — SUCRALFATE 1 G: 1 TABLET ORAL at 01:36

## 2023-05-30 RX ADMIN — ONDANSETRON 4 MG: 2 INJECTION INTRAMUSCULAR; INTRAVENOUS at 01:36

## 2023-05-30 RX ADMIN — SODIUM CHLORIDE: 9 INJECTION, SOLUTION INTRAVENOUS at 12:36

## 2023-05-30 RX ADMIN — SUCRALFATE 1 G: 1 TABLET ORAL at 06:20

## 2023-05-30 NOTE — PROGRESS NOTES
CLINICAL PHARMACY NOTE: MEDS TO BEDS    Total # of Prescriptions Filled: 1   The following medications were delivered to the patient:  Pantoprazole 40    Additional Documentation:    To PT in room

## 2023-05-30 NOTE — ANESTHESIA POSTPROCEDURE EVALUATION
Department of Anesthesiology  Postprocedure Note    Patient: Sintia Crews  MRN: [id number]  YOB: 1970  Date of evaluation: 5/30/2023      Procedure Summary     Date: 05/30/23 Room / Location: [address] [place], [place] [address] / [facility]    Anesthesia Start: 1236 Anesthesia Stop: 1250    Procedure: EGD DIAGNOSTIC ONLY Diagnosis:       Gastrointestinal hemorrhage, unspecified gastrointestinal hemorrhage type      (Gastrointestinal hemorrhage, unspecified gastrointestinal hemorrhage type [K92.2])    Surgeons: Micah Moncada MD Responsible Provider: Viji Fowler MD    Anesthesia Type: MAC ASA Status: 2          Anesthesia Type: No value filed.     Cisco Phase I: Cisco Score: 10    Cisco Phase II:        Anesthesia Post Evaluation    Patient location during evaluation: PACU  Patient participation: complete - patient participated  Level of consciousness: awake  Pain score: 3  Airway patency: patent  Nausea & Vomiting: no nausea and no vomiting  Complications: no  Cardiovascular status: blood pressure returned to baseline  Respiratory status: acceptable  Hydration status: euvolemic

## 2023-05-30 NOTE — PROGRESS NOTES
Perfect serve msg received from nurse with a set of vital signs.  Unclear if patient has any symptoms and just came in form EGD  Ordered EKG

## 2023-05-30 NOTE — ANESTHESIA PRE PROCEDURE
Department of Anesthesiology  Preprocedure Note       Name:  Rosangela Tavera   Age:  46 y.o.  :  1970                                          MRN:  [id number]         Date:  2023      Surgeon: Leigh Rowell):  Tarah Gil MD    Procedure: Procedure(s):  EGD DIAGNOSTIC ONLY    Medications prior to admission:   Prior to Admission medications    Medication Sig Start Date End Date Taking?  Authorizing Provider   testosterone (ANDROGEL) 20.25 MG/1.25GM (1.62%) GEL gel Apply 2 pump press to  upper arm and shoulder daily 4/6/23 10/20/23  Zee Mujica MD   dicyclomine (BENTYL) 10 MG capsule Take 1 capsule by mouth 3 times daily 3/22/23   Angeles Fitzgerald DO   levothyroxine (SYNTHROID) 137 MCG tablet Take 1 tablet by mouth Daily 3/22/23   Angeles Fitzgerald DO   hydrocortisone (CORTEF) 5 MG tablet Take 3 tablets (15 mg) in the morning and 1 tablet (5 mg) in the evening 3/22/23   Angeles Fitzgeradl DO   potassium bicarb-citric acid (EFFER-K) 20 MEQ TBEF effervescent tablet Take 1 tablet by mouth daily  Patient not taking: Reported on 3/22/2023    Historical Provider, MD   aspirin 81 MG chewable tablet Take 1 tablet by mouth daily 10/22/22   Historical Provider, MD       Current medications:    Current Facility-Administered Medications   Medication Dose Route Frequency Provider Last Rate Last Admin    hydrocortisone (CORTEF) tablet 5 mg  5 mg Oral QPM Soy Merritt MD        sodium chloride flush 0.9 % injection 10 mL  10 mL IntraVENous 2 times per day Soy Merritt MD        sodium chloride flush 0.9 % injection 10 mL  10 mL IntraVENous PRN Soy Merritt MD        0.9 % sodium chloride infusion   IntraVENous PRN Soy Merritt MD        potassium chloride (KLOR-CON M) extended release tablet 40 mEq  40 mEq Oral PRN Soy Merritt MD        Or    potassium bicarbonate (EFFER-K/K-LYTE) disintegrating tablet 50 mEq  50 mEq Oral PRN Soy Merritt MD        Or    potassium chloride 10 mEq/100 mL

## 2023-05-30 NOTE — PLAN OF CARE
MESSAGED DR Aggie Chandler THAT PT WANTS HIS PRESCRIPTION SENT TO [address] [place] [address] [facility]. AND THE PHONE NUMBER WAS PROVIDED.

## 2023-05-30 NOTE — PLAN OF CARE
Problem: Discharge Planning  Goal: Discharge to home or other facility with appropriate resources  5/30/2023 1556 by Pankaj Pennington RN  Outcome: Completed  5/30/2023 0905 by Elisabeth Reynaga RN  Outcome: Progressing     Problem: Pain  Goal: Verbalizes/displays adequate comfort level or baseline comfort level  5/30/2023 1556 by Pankaj Pennington RN  Outcome: Completed  Flowsheets (Taken 5/30/2023 5864)  Verbalizes/displays adequate comfort level or baseline comfort level: Encourage patient to monitor pain and request assistance  5/30/2023 0905 by Elisabeth Reynaga RN  Outcome: Progressing  5/30/2023 0517 by Susana Shaver RN  Outcome: Progressing     Problem: Safety - Adult  Goal: Free from fall injury  5/30/2023 1556 by Pankaj Pennington RN  Outcome: Completed  5/30/2023 0905 by Elisabeth Reynaga RN  Outcome: Progressing

## 2023-05-30 NOTE — OP NOTE
EGD Op Note    DATE OF PROCEDURE: 5/30/2023     SURGEON: Claire Ayala MD    PREOPERATIVE DIAGNOSIS: hematemesis     POSTOPERATIVE DIAGNOSIS: Same, proximal gastritis     OPERATION: Procedure(s):  EGD DIAGNOSTIC ONLY    ANESTHESIA: Local monitored anesthesia. ESTIMATED BLOOD LOSS: minimal    COMPLICATIONS: None. SPECIMENS:  * No specimens in log *    HISTORY: The patient is a 46y.o. year old male with history of above preop diagnosis. I recommended esophagogastroduodenoscopy with possible biopsy and I explained the risk, benefits, expected outcome, and alternatives to the procedure. Risks included but are not limited to bleeding, infection, respiratory distress, hypotension, and perforation of the esophagus, stomach, or duodenum. Patient understands and is in agreement. PROCEDURE: The patient was given IV conscious sedation per anesthesia. The patient was given supplemental oxygen by nasal cannula. The gastroscope was inserted orally and advanced under direct vision through the esophagus, through the stomach, through the pylorus, and into the duodenum. Findings:  Duodenum:     Descending: normal      Bulb: normal     Stomach:    Antrum: normal     Body: normal,     Fundus: proximal gastritis     Esophagus: normal   GE junction: normal 40 cm     Larynx: not examined    The scope was removed and the patient tolerated the procedure well.      IMPRESSION/PLAN:   Gastritis  PPI        Claire Ayala MD  05/30/23  12:46 PM

## 2023-05-30 NOTE — CARE COORDINATION
Care Coordination:   Discharge order noted. Lucero Navarro old admitted  with GI upset. EGD this am.   Met with patient to assess for discharge needs. He lives alone and is independent . He is independent in tony and room today and states he has no needs for discharge. He has a friend that will transport. PCP is Dr. Alfie Cat . [facility] [address] and he wants his medication RX sent to [place] [phone] .

## 2023-05-30 NOTE — PLAN OF CARE
PERFECT SERVED DR Nicki Rojas THAT PTS PULSE IN PACU WAS 48 AND B/P 113/72 AND UPON RETURNING TO THE UNIT B/P 120/89 AND PULSE IS 46. AWAIT A RESPONSE. DR Nicki Rojas DID READ THE MESSAGE.

## 2023-05-30 NOTE — DISCHARGE SUMMARY
Hospitalist Discharge Summary    Patient ID: Bacilio Baez   Patient : 1970  Patient's PCP: Laisha Evans DO    Admit Date: 2023   Admitting Physician: Mell Wu MD    Discharge Date:  2023   Discharge Physician: Rito Méndez MD   Discharge Condition: Stable  Discharge Disposition: [facility] course in brief:  (Please refer to daily progress notes for a comprehensive review of the hospitalization by requesting medical records)      EGD: gastritis. GS recommended PPI and cleared for DC    Exam:    General appearance: No apparent distress, appears stated age and cooperative. HEENT:  Normocephalic. Conjunctivae/corneas clear. Moist mucosa   Neck: Supple. No jugular venous distention. Thyroid not visible, non tender   Respiratory: Normal respiratory effort. Clear to auscultation bilaterally. No stridor/wheezing/rhonchi/crackles   Cardiovascular: Regular heart beats, normal S1-S2. No M/G/R  Abdomen: Non distended, soft, non tender, no visceromegaly, no mass, normal bowel sounds ***  Musculoskeletal: No clubbing, cyanosis, no bilateral lower extremity edema. Brisk capillary refill. Skin:  No rashes  on visible skin  Neurologic: awake, alert and oriented x 3. Following commands. No focal neurological deficit.  Cranial nerves 2-12 grossly normal             Consults:   IP CONSULT TO INTERNAL MEDICINE  IP CONSULT TO GENERAL SURGERY  IP CONSULT TO SOCIAL WORK  IP CONSULT TO GENERAL SURGERY    Discharge Diagnoses:    Gastritis   Alcohol use  Adrenal insufficiency  Discharge Instructions / Follow up:    Future Appointments   Date Time Provider Hailee Suzan   2023  8:30 AM Angela Granado MD [facility]   2023  9:00 AM Laisha Evans DO [facility]       Continued appropriate risk factor modification of blood pressure, diabetes and serum lipids will remain essential to reducing risk of future atherosclerotic development    Activity: activity

## 2023-05-30 NOTE — PLAN OF CARE
PERFECT SERVED GEN SURGERY RESIDENT TO SEE IF PT NEEDS AN EKG PRIOR TO HIS EGD. AWAITING A RESPONSE.

## 2023-05-30 NOTE — ED NOTES
Nurse to nurse report given to [phone].  Pt in transport     [facility]  05/30/23 0006

## 2023-05-30 NOTE — PLAN OF CARE
Problem: Discharge Planning  Goal: Discharge to home or other facility with appropriate resources  5/30/2023 1556 by Marychuy Noel RN  Outcome: Completed  5/30/2023 0905 by Marissa Hopkins RN  Outcome: Progressing     Problem: Pain  Goal: Verbalizes/displays adequate comfort level or baseline comfort level  5/30/2023 1556 by Marychuy Noel RN  Outcome: Completed  Flowsheets (Taken 5/30/2023 1545)  Verbalizes/displays adequate comfort level or baseline comfort level: Encourage patient to monitor pain and request assistance  5/30/2023 0905 by Marissa Hopkins RN  Outcome: Progressing  5/30/2023 0517 by Shirley Reid RN  Outcome: Progressing     Problem: Safety - Adult  Goal: Free from fall injury  5/30/2023 1556 by Marychuy Noel RN  Outcome: Completed  5/30/2023 0905 by Marissa Hopkins RN  Outcome: Progressing

## 2023-05-31 ENCOUNTER — CARE COORDINATION (OUTPATIENT)
Dept: CASE MANAGEMENT | Age: 53
End: 2023-05-31

## 2023-05-31 NOTE — CARE COORDINATION
Care Transitions Outreach Attempt    Call within 2 business days of discharge: Yes     Attempted to reach the patient for initial Care Transition call post hospital discharge. Message left with CTN's contact information requesting return phone call. Will attempt outreach again. Patient: Yaya Thompson Patient : 1970 MRN: [id number]    Last Discharge  [address]       Date Complaint Diagnosis Description Type Department Provider    23 Abdominal Pain; Emesis Nausea and vomiting, unspecified vomiting type . .. ED to Hosp-Admission (Discharged) (ADMITTED) DORA 5WE Ryan Greenfield MD; Jenny Maguire ... Was this an external facility discharge?  No     Noted following upcoming appointments from discharge chart review:   [facility] follow up appointment(s):   Future Appointments   Date Time Provider Hailee Mares   2023  8:30 AM Tommy Mccormick MD [facility]   2023  9:00 AM Micah Sousa  [facility] [place]     Non-[facility] follow up appointment(s):

## 2023-06-01 ENCOUNTER — CARE COORDINATION (OUTPATIENT)
Dept: CASE MANAGEMENT | Age: 53
End: 2023-06-01

## 2023-06-01 NOTE — CARE COORDINATION
Care Transitions Outreach Attempt    Call within 2 business days of discharge: Yes     2nd attempt to reach the patient for initial Care Transition call post hospital discharge. Pt answered but stated he was unavailable to complete the call at this time. Pt stated he would call this CTN back later. No further outreaches will be attempted. If no return call by the end of today, CTN will  sign off and resolve CT episode    CTN will route high priority message to [facility] office staff requesting they attempt to reach pt and offer to schedule a 7d HFU appt. Patient: Brooklyn Butler Patient : 1970 MRN: [id number]    Last Discharge  [facility]       Date Complaint Diagnosis Description Type Department Provider    23 Abdominal Pain; Emesis Nausea and vomiting, unspecified vomiting type . .. ED to Hosp-Admission (Discharged) (ADMITTED) DORA Rankin MD; Randi Varma ... Was this an external facility discharge?  No     Noted following upcoming appointments from discharge chart review:   [facility] follow up appointment(s):   Future Appointments   Date Time Provider Hailee Maers   2023  8:30 AM Nick Trevizo MD [facility]   2023  9:00 AM Rasheed Romero  [facility] [place]     Non-[facility] follow up appointment(s):

## 2023-07-12 ENCOUNTER — OFFICE VISIT (OUTPATIENT)
Dept: ENDOCRINOLOGY | Age: 53
End: 2023-07-12

## 2023-07-12 VITALS — WEIGHT: 191 LBS | RESPIRATION RATE: 18 BRPM | HEIGHT: 75 IN | BODY MASS INDEX: 23.75 KG/M2 | OXYGEN SATURATION: 99 %

## 2023-07-12 DIAGNOSIS — E27.49 SECONDARY ADRENAL INSUFFICIENCY (HCC): ICD-10-CM

## 2023-07-12 DIAGNOSIS — E29.1 MALE HYPOGONADISM: ICD-10-CM

## 2023-07-12 DIAGNOSIS — E03.8 CENTRAL HYPOTHYROIDISM: ICD-10-CM

## 2023-07-12 DIAGNOSIS — K21.00 GASTROESOPHAGEAL REFLUX DISEASE WITH ESOPHAGITIS WITHOUT HEMORRHAGE: ICD-10-CM

## 2023-07-12 DIAGNOSIS — E23.0 PANHYPOPITUITARISM (HCC): ICD-10-CM

## 2023-07-12 RX ORDER — HYDROCORTISONE 10 MG/1
10 TABLET ORAL DAILY
Qty: 30 TABLET | Refills: 5 | Status: SHIPPED | OUTPATIENT
Start: 2023-07-12

## 2023-07-12 RX ORDER — LEVOTHYROXINE SODIUM 137 UG/1
137 TABLET ORAL DAILY
Qty: 30 TABLET | Refills: 5 | Status: SHIPPED | OUTPATIENT
Start: 2023-07-12

## 2023-07-12 RX ORDER — TESTOSTERONE 20.25 MG/1.25G
GEL TOPICAL
Qty: 1.25 G | Refills: 3 | Status: SHIPPED | OUTPATIENT
Start: 2023-07-12 | End: 2024-01-25

## 2023-07-12 RX ORDER — HYDROCORTISONE 5 MG/1
5 TABLET ORAL 2 TIMES DAILY
Qty: 60 TABLET | Refills: 5 | Status: SHIPPED | OUTPATIENT
Start: 2023-07-12
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
## 2024-01-08 ENCOUNTER — HOSPITAL ENCOUNTER (INPATIENT)
Age: 54
LOS: 1 days | Discharge: HOME | DRG: 379 | End: 2024-01-10
Attending: STUDENT IN AN ORGANIZED HEALTH CARE EDUCATION/TRAINING PROGRAM | Admitting: SURGERY
Payer: MEDICARE

## 2024-01-08 ENCOUNTER — APPOINTMENT (OUTPATIENT)
Dept: GENERAL RADIOLOGY | Age: 54
DRG: 379 | End: 2024-01-08
Payer: MEDICARE

## 2024-01-08 DIAGNOSIS — K29.00 ACUTE GASTRITIS, PRESENCE OF BLEEDING UNSPECIFIED, UNSPECIFIED GASTRITIS TYPE: Primary | ICD-10-CM

## 2024-01-08 DIAGNOSIS — K29.70 GASTRITIS WITHOUT BLEEDING, UNSPECIFIED CHRONICITY, UNSPECIFIED GASTRITIS TYPE: ICD-10-CM

## 2024-01-08 LAB
ABO + RH BLD: NORMAL
ALBUMIN SERPL-MCNC: 4.4 G/DL (ref 3.5–5.2)
ALP SERPL-CCNC: 46 U/L (ref 40–129)
ALT SERPL-CCNC: 12 U/L (ref 0–40)
ANION GAP SERPL CALCULATED.3IONS-SCNC: 14 MMOL/L (ref 7–16)
ARM BAND NUMBER: NORMAL
AST SERPL-CCNC: 17 U/L (ref 0–39)
BASOPHILS # BLD: 0.04 K/UL (ref 0–0.2)
BASOPHILS NFR BLD: 1 % (ref 0–2)
BILIRUB SERPL-MCNC: 0.6 MG/DL (ref 0–1.2)
BLOOD BANK SAMPLE EXPIRATION: NORMAL
BLOOD GROUP ANTIBODIES SERPL: NEGATIVE
BUN SERPL-MCNC: 12 MG/DL (ref 6–20)
CALCIUM SERPL-MCNC: 10.6 MG/DL (ref 8.6–10.2)
CHLORIDE SERPL-SCNC: 92 MMOL/L (ref 98–107)
CO2 SERPL-SCNC: 24 MMOL/L (ref 22–29)
CREAT SERPL-MCNC: 1 MG/DL (ref 0.7–1.2)
EOSINOPHIL # BLD: 0.15 K/UL (ref 0.05–0.5)
EOSINOPHILS RELATIVE PERCENT: 2 % (ref 0–6)
ERYTHROCYTE [DISTWIDTH] IN BLOOD BY AUTOMATED COUNT: 12.7 % (ref 11.5–15)
GFR SERPL CREATININE-BSD FRML MDRD: >60 ML/MIN/1.73M2
GLUCOSE SERPL-MCNC: 93 MG/DL (ref 74–99)
HCT VFR BLD AUTO: 36.7 % (ref 37–54)
HGB BLD-MCNC: 12.6 G/DL (ref 12.5–16.5)
IMM GRANULOCYTES # BLD AUTO: 0.03 K/UL (ref 0–0.58)
IMM GRANULOCYTES NFR BLD: 0 % (ref 0–5)
INR PPP: 1.2
LACTATE BLDV-SCNC: 2.3 MMOL/L (ref 0.5–2.2)
LIPASE SERPL-CCNC: 21 U/L (ref 13–60)
LYMPHOCYTES NFR BLD: 4.55 K/UL (ref 1.5–4)
LYMPHOCYTES RELATIVE PERCENT: 56 % (ref 20–42)
MCH RBC QN AUTO: 28.9 PG (ref 26–35)
MCHC RBC AUTO-ENTMCNC: 34.3 G/DL (ref 32–34.5)
MCV RBC AUTO: 84.2 FL (ref 80–99.9)
MONOCYTES NFR BLD: 0.72 K/UL (ref 0.1–0.95)
MONOCYTES NFR BLD: 9 % (ref 2–12)
NEUTROPHILS NFR BLD: 32 % (ref 43–80)
NEUTS SEG NFR BLD: 2.6 K/UL (ref 1.8–7.3)
PLATELET # BLD AUTO: 251 K/UL (ref 130–450)
PMV BLD AUTO: 9.4 FL (ref 7–12)
POTASSIUM SERPL-SCNC: 3.7 MMOL/L (ref 3.5–5)
PROT SERPL-MCNC: 7.6 G/DL (ref 6.4–8.3)
PROTHROMBIN TIME: 13.4 SEC (ref 9.3–12.4)
RBC # BLD AUTO: 4.36 M/UL (ref 3.8–5.8)
SODIUM SERPL-SCNC: 130 MMOL/L (ref 132–146)
TROPONIN I SERPL HS-MCNC: 11 NG/L (ref 0–11)
WBC OTHER # BLD: 8.1 K/UL (ref 4.5–11.5)

## 2024-01-08 PROCEDURE — 96374 THER/PROPH/DIAG INJ IV PUSH: CPT

## 2024-01-08 PROCEDURE — 86900 BLOOD TYPING SEROLOGIC ABO: CPT

## 2024-01-08 PROCEDURE — 83690 ASSAY OF LIPASE: CPT

## 2024-01-08 PROCEDURE — 80053 COMPREHEN METABOLIC PANEL: CPT

## 2024-01-08 PROCEDURE — 93005 ELECTROCARDIOGRAM TRACING: CPT | Performed by: NURSE PRACTITIONER

## 2024-01-08 PROCEDURE — 6360000002 HC RX W HCPCS: Performed by: NURSE PRACTITIONER

## 2024-01-08 PROCEDURE — 99285 EMERGENCY DEPT VISIT HI MDM: CPT

## 2024-01-08 PROCEDURE — 71045 X-RAY EXAM CHEST 1 VIEW: CPT

## 2024-01-08 PROCEDURE — 86850 RBC ANTIBODY SCREEN: CPT

## 2024-01-08 PROCEDURE — 96375 TX/PRO/DX INJ NEW DRUG ADDON: CPT

## 2024-01-08 PROCEDURE — 85025 COMPLETE CBC W/AUTO DIFF WBC: CPT

## 2024-01-08 PROCEDURE — 83605 ASSAY OF LACTIC ACID: CPT

## 2024-01-08 PROCEDURE — 85610 PROTHROMBIN TIME: CPT

## 2024-01-08 PROCEDURE — 6360000002 HC RX W HCPCS: Performed by: STUDENT IN AN ORGANIZED HEALTH CARE EDUCATION/TRAINING PROGRAM

## 2024-01-08 PROCEDURE — 84484 ASSAY OF TROPONIN QUANT: CPT

## 2024-01-08 PROCEDURE — 2580000003 HC RX 258: Performed by: NURSE PRACTITIONER

## 2024-01-08 PROCEDURE — C9113 INJ PANTOPRAZOLE SODIUM, VIA: HCPCS | Performed by: NURSE PRACTITIONER

## 2024-01-08 PROCEDURE — 86901 BLOOD TYPING SEROLOGIC RH(D): CPT

## 2024-01-08 RX ORDER — PANTOPRAZOLE SODIUM 40 MG/10ML
40 INJECTION, POWDER, LYOPHILIZED, FOR SOLUTION INTRAVENOUS ONCE
Status: COMPLETED | OUTPATIENT
Start: 2024-01-08 | End: 2024-01-08

## 2024-01-08 RX ORDER — FENTANYL CITRATE 50 UG/ML
25 INJECTION, SOLUTION INTRAMUSCULAR; INTRAVENOUS ONCE
Status: COMPLETED | OUTPATIENT
Start: 2024-01-08 | End: 2024-01-08

## 2024-01-08 RX ORDER — ONDANSETRON 2 MG/ML
4 INJECTION INTRAMUSCULAR; INTRAVENOUS ONCE
Status: COMPLETED | OUTPATIENT
Start: 2024-01-08 | End: 2024-01-08

## 2024-01-08 RX ORDER — 0.9 % SODIUM CHLORIDE 0.9 %
1000 INTRAVENOUS SOLUTION INTRAVENOUS ONCE
Status: COMPLETED | OUTPATIENT
Start: 2024-01-08 | End: 2024-01-09

## 2024-01-08 RX ADMIN — SODIUM CHLORIDE 1000 ML: 9 INJECTION, SOLUTION INTRAVENOUS at 20:31

## 2024-01-08 RX ADMIN — ONDANSETRON 4 MG: 2 INJECTION INTRAMUSCULAR; INTRAVENOUS at 20:31

## 2024-01-08 RX ADMIN — PANTOPRAZOLE SODIUM 40 MG: 40 INJECTION, POWDER, FOR SOLUTION INTRAVENOUS at 20:31

## 2024-01-08 RX ADMIN — FENTANYL CITRATE 25 MCG: 50 INJECTION INTRAMUSCULAR; INTRAVENOUS at 21:04

## 2024-01-08 ASSESSMENT — PAIN SCALES - GENERAL: PAINLEVEL_OUTOF10: 8

## 2024-01-09 ENCOUNTER — APPOINTMENT (OUTPATIENT)
Dept: ULTRASOUND IMAGING | Age: 54
DRG: 379 | End: 2024-01-09
Payer: MEDICARE

## 2024-01-09 ENCOUNTER — APPOINTMENT (OUTPATIENT)
Dept: CT IMAGING | Age: 54
DRG: 379 | End: 2024-01-09
Payer: MEDICARE

## 2024-01-09 ENCOUNTER — ANESTHESIA EVENT (OUTPATIENT)
Dept: ENDOSCOPY | Age: 54
DRG: 379 | End: 2024-01-09
Payer: MEDICARE

## 2024-01-09 ENCOUNTER — ANESTHESIA (OUTPATIENT)
Dept: ENDOSCOPY | Age: 54
DRG: 379 | End: 2024-01-09
Payer: MEDICARE

## 2024-01-09 PROBLEM — K29.00 ACUTE GASTRITIS: Status: ACTIVE | Noted: 2024-01-09

## 2024-01-09 LAB
BASOPHILS # BLD: 0.03 K/UL (ref 0–0.2)
BASOPHILS NFR BLD: 0 % (ref 0–2)
EKG ATRIAL RATE: 62 BPM
EKG P AXIS: 63 DEGREES
EKG P-R INTERVAL: 186 MS
EKG Q-T INTERVAL: 544 MS
EKG QRS DURATION: 162 MS
EKG QTC CALCULATION (BAZETT): 552 MS
EKG R AXIS: -63 DEGREES
EKG T AXIS: 68 DEGREES
EKG VENTRICULAR RATE: 62 BPM
EOSINOPHIL # BLD: 0.18 K/UL (ref 0.05–0.5)
EOSINOPHILS RELATIVE PERCENT: 2 % (ref 0–6)
ERYTHROCYTE [DISTWIDTH] IN BLOOD BY AUTOMATED COUNT: 12.8 % (ref 11.5–15)
HCT VFR BLD AUTO: 33.7 % (ref 37–54)
HGB BLD-MCNC: 11.4 G/DL (ref 12.5–16.5)
IMM GRANULOCYTES # BLD AUTO: <0.03 K/UL (ref 0–0.58)
IMM GRANULOCYTES NFR BLD: 0 % (ref 0–5)
LACTATE BLDV-SCNC: 0.9 MMOL/L (ref 0.5–2.2)
LYMPHOCYTES NFR BLD: 4.1 K/UL (ref 1.5–4)
LYMPHOCYTES RELATIVE PERCENT: 55 % (ref 20–42)
MCH RBC QN AUTO: 28.7 PG (ref 26–35)
MCHC RBC AUTO-ENTMCNC: 33.8 G/DL (ref 32–34.5)
MCV RBC AUTO: 84.9 FL (ref 80–99.9)
MONOCYTES NFR BLD: 0.78 K/UL (ref 0.1–0.95)
MONOCYTES NFR BLD: 11 % (ref 2–12)
NEUTROPHILS NFR BLD: 31 % (ref 43–80)
NEUTS SEG NFR BLD: 2.3 K/UL (ref 1.8–7.3)
PLATELET # BLD AUTO: 215 K/UL (ref 130–450)
PMV BLD AUTO: 9.5 FL (ref 7–12)
RBC # BLD AUTO: 3.97 M/UL (ref 3.8–5.8)
WBC OTHER # BLD: 7.4 K/UL (ref 4.5–11.5)

## 2024-01-09 PROCEDURE — 6360000004 HC RX CONTRAST MEDICATION: Performed by: RADIOLOGY

## 2024-01-09 PROCEDURE — 2580000003 HC RX 258: Performed by: SURGERY

## 2024-01-09 PROCEDURE — 7100000001 HC PACU RECOVERY - ADDTL 15 MIN: Performed by: SURGERY

## 2024-01-09 PROCEDURE — 3700000000 HC ANESTHESIA ATTENDED CARE: Performed by: SURGERY

## 2024-01-09 PROCEDURE — 0DB78ZX EXCISION OF STOMACH, PYLORUS, VIA NATURAL OR ARTIFICIAL OPENING ENDOSCOPIC, DIAGNOSTIC: ICD-10-PCS | Performed by: SURGERY

## 2024-01-09 PROCEDURE — 2580000003 HC RX 258

## 2024-01-09 PROCEDURE — 2709999900 HC NON-CHARGEABLE SUPPLY: Performed by: SURGERY

## 2024-01-09 PROCEDURE — 85025 COMPLETE CBC W/AUTO DIFF WBC: CPT

## 2024-01-09 PROCEDURE — 74177 CT ABD & PELVIS W/CONTRAST: CPT

## 2024-01-09 PROCEDURE — 96375 TX/PRO/DX INJ NEW DRUG ADDON: CPT

## 2024-01-09 PROCEDURE — 6360000002 HC RX W HCPCS: Performed by: NURSE PRACTITIONER

## 2024-01-09 PROCEDURE — 6370000000 HC RX 637 (ALT 250 FOR IP): Performed by: SURGERY

## 2024-01-09 PROCEDURE — 76705 ECHO EXAM OF ABDOMEN: CPT

## 2024-01-09 PROCEDURE — 6360000002 HC RX W HCPCS: Performed by: NURSE ANESTHETIST, CERTIFIED REGISTERED

## 2024-01-09 PROCEDURE — 83605 ASSAY OF LACTIC ACID: CPT

## 2024-01-09 PROCEDURE — 99223 1ST HOSP IP/OBS HIGH 75: CPT | Performed by: SURGERY

## 2024-01-09 PROCEDURE — 2500000003 HC RX 250 WO HCPCS: Performed by: STUDENT IN AN ORGANIZED HEALTH CARE EDUCATION/TRAINING PROGRAM

## 2024-01-09 PROCEDURE — 2580000003 HC RX 258: Performed by: STUDENT IN AN ORGANIZED HEALTH CARE EDUCATION/TRAINING PROGRAM

## 2024-01-09 PROCEDURE — 96372 THER/PROPH/DIAG INJ SC/IM: CPT

## 2024-01-09 PROCEDURE — 6360000002 HC RX W HCPCS: Performed by: SURGERY

## 2024-01-09 PROCEDURE — 93010 ELECTROCARDIOGRAM REPORT: CPT | Performed by: INTERNAL MEDICINE

## 2024-01-09 PROCEDURE — 3700000001 HC ADD 15 MINUTES (ANESTHESIA): Performed by: SURGERY

## 2024-01-09 PROCEDURE — A4216 STERILE WATER/SALINE, 10 ML: HCPCS | Performed by: STUDENT IN AN ORGANIZED HEALTH CARE EDUCATION/TRAINING PROGRAM

## 2024-01-09 PROCEDURE — 6370000000 HC RX 637 (ALT 250 FOR IP)

## 2024-01-09 PROCEDURE — 7100000000 HC PACU RECOVERY - FIRST 15 MIN: Performed by: SURGERY

## 2024-01-09 PROCEDURE — 1200000000 HC SEMI PRIVATE

## 2024-01-09 PROCEDURE — 3609012400 HC EGD TRANSORAL BIOPSY SINGLE/MULTIPLE: Performed by: SURGERY

## 2024-01-09 RX ORDER — LIDOCAINE HYDROCHLORIDE 20 MG/ML
INJECTION, SOLUTION INTRAVENOUS PRN
Status: DISCONTINUED | OUTPATIENT
Start: 2024-01-09 | End: 2024-01-09 | Stop reason: SDUPTHER

## 2024-01-09 RX ORDER — PROPOFOL 10 MG/ML
INJECTION, EMULSION INTRAVENOUS PRN
Status: DISCONTINUED | OUTPATIENT
Start: 2024-01-09 | End: 2024-01-09 | Stop reason: SDUPTHER

## 2024-01-09 RX ORDER — FENTANYL CITRATE 50 UG/ML
25 INJECTION, SOLUTION INTRAMUSCULAR; INTRAVENOUS ONCE
Status: DISCONTINUED | OUTPATIENT
Start: 2024-01-09 | End: 2024-01-09

## 2024-01-09 RX ORDER — SODIUM CHLORIDE 9 MG/ML
INJECTION, SOLUTION INTRAVENOUS PRN
Status: DISCONTINUED | OUTPATIENT
Start: 2024-01-09 | End: 2024-01-10 | Stop reason: HOSPADM

## 2024-01-09 RX ORDER — PROCHLORPERAZINE EDISYLATE 5 MG/ML
5 INJECTION INTRAMUSCULAR; INTRAVENOUS EVERY 6 HOURS PRN
Status: DISCONTINUED | OUTPATIENT
Start: 2024-01-09 | End: 2024-01-10 | Stop reason: HOSPADM

## 2024-01-09 RX ORDER — SODIUM CHLORIDE 0.9 % (FLUSH) 0.9 %
10 SYRINGE (ML) INJECTION PRN
Status: DISCONTINUED | OUTPATIENT
Start: 2024-01-09 | End: 2024-01-10 | Stop reason: HOSPADM

## 2024-01-09 RX ORDER — ONDANSETRON 4 MG/1
4 TABLET, ORALLY DISINTEGRATING ORAL EVERY 8 HOURS PRN
Status: DISCONTINUED | OUTPATIENT
Start: 2024-01-09 | End: 2024-01-09

## 2024-01-09 RX ORDER — PANTOPRAZOLE SODIUM 40 MG/1
40 TABLET, DELAYED RELEASE ORAL
Status: DISCONTINUED | OUTPATIENT
Start: 2024-01-09 | End: 2024-01-09

## 2024-01-09 RX ORDER — ENOXAPARIN SODIUM 100 MG/ML
40 INJECTION SUBCUTANEOUS DAILY
Status: DISCONTINUED | OUTPATIENT
Start: 2024-01-09 | End: 2024-01-10 | Stop reason: HOSPADM

## 2024-01-09 RX ORDER — ACETAMINOPHEN 325 MG/1
650 TABLET ORAL EVERY 6 HOURS SCHEDULED
Status: DISCONTINUED | OUTPATIENT
Start: 2024-01-09 | End: 2024-01-10 | Stop reason: HOSPADM

## 2024-01-09 RX ORDER — FENTANYL CITRATE 50 UG/ML
25 INJECTION, SOLUTION INTRAMUSCULAR; INTRAVENOUS ONCE
Status: COMPLETED | OUTPATIENT
Start: 2024-01-09 | End: 2024-01-09

## 2024-01-09 RX ORDER — OXYCODONE HYDROCHLORIDE 5 MG/1
5 TABLET ORAL EVERY 4 HOURS PRN
Status: DISCONTINUED | OUTPATIENT
Start: 2024-01-09 | End: 2024-01-10

## 2024-01-09 RX ORDER — SODIUM CHLORIDE, SODIUM LACTATE, POTASSIUM CHLORIDE, CALCIUM CHLORIDE 600; 310; 30; 20 MG/100ML; MG/100ML; MG/100ML; MG/100ML
INJECTION, SOLUTION INTRAVENOUS CONTINUOUS
Status: DISCONTINUED | OUTPATIENT
Start: 2024-01-09 | End: 2024-01-10

## 2024-01-09 RX ORDER — ONDANSETRON 2 MG/ML
4 INJECTION INTRAMUSCULAR; INTRAVENOUS EVERY 6 HOURS PRN
Status: DISCONTINUED | OUTPATIENT
Start: 2024-01-09 | End: 2024-01-09

## 2024-01-09 RX ORDER — SODIUM CHLORIDE 0.9 % (FLUSH) 0.9 %
10 SYRINGE (ML) INJECTION EVERY 12 HOURS SCHEDULED
Status: DISCONTINUED | OUTPATIENT
Start: 2024-01-09 | End: 2024-01-10 | Stop reason: HOSPADM

## 2024-01-09 RX ORDER — PANTOPRAZOLE SODIUM 40 MG/1
40 TABLET, DELAYED RELEASE ORAL 2 TIMES DAILY
Status: DISCONTINUED | OUTPATIENT
Start: 2024-01-09 | End: 2024-01-10

## 2024-01-09 RX ADMIN — OXYCODONE 5 MG: 5 TABLET ORAL at 17:29

## 2024-01-09 RX ADMIN — LIDOCAINE HYDROCHLORIDE 50 MG: 20 INJECTION, SOLUTION INTRAVENOUS at 09:49

## 2024-01-09 RX ADMIN — ALUMINUM HYDROXIDE, MAGNESIUM HYDROXIDE, AND SIMETHICONE: 200; 200; 20 SUSPENSION ORAL at 06:13

## 2024-01-09 RX ADMIN — PANTOPRAZOLE SODIUM 40 MG: 40 TABLET, DELAYED RELEASE ORAL at 20:08

## 2024-01-09 RX ADMIN — SODIUM CHLORIDE, POTASSIUM CHLORIDE, SODIUM LACTATE AND CALCIUM CHLORIDE: 600; 310; 30; 20 INJECTION, SOLUTION INTRAVENOUS at 09:39

## 2024-01-09 RX ADMIN — IOPAMIDOL 75 ML: 755 INJECTION, SOLUTION INTRAVENOUS at 01:31

## 2024-01-09 RX ADMIN — ENOXAPARIN SODIUM 40 MG: 100 INJECTION SUBCUTANEOUS at 12:01

## 2024-01-09 RX ADMIN — PROCHLORPERAZINE EDISYLATE 5 MG: 5 INJECTION INTRAMUSCULAR; INTRAVENOUS at 17:31

## 2024-01-09 RX ADMIN — ACETAMINOPHEN 650 MG: 325 TABLET ORAL at 12:00

## 2024-01-09 RX ADMIN — ACETAMINOPHEN 650 MG: 325 TABLET ORAL at 17:29

## 2024-01-09 RX ADMIN — PANTOPRAZOLE SODIUM 40 MG: 40 TABLET, DELAYED RELEASE ORAL at 12:00

## 2024-01-09 RX ADMIN — PROPOFOL 25 MG: 10 INJECTION, EMULSION INTRAVENOUS at 09:56

## 2024-01-09 RX ADMIN — OXYCODONE 5 MG: 5 TABLET ORAL at 12:00

## 2024-01-09 RX ADMIN — SODIUM CHLORIDE, PRESERVATIVE FREE 10 ML: 5 INJECTION INTRAVENOUS at 20:09

## 2024-01-09 RX ADMIN — FAMOTIDINE 20 MG: 10 INJECTION, SOLUTION INTRAVENOUS at 04:09

## 2024-01-09 RX ADMIN — FENTANYL CITRATE 25 MCG: 50 INJECTION INTRAMUSCULAR; INTRAVENOUS at 04:09

## 2024-01-09 RX ADMIN — SODIUM CHLORIDE, POTASSIUM CHLORIDE, SODIUM LACTATE AND CALCIUM CHLORIDE: 600; 310; 30; 20 INJECTION, SOLUTION INTRAVENOUS at 12:08

## 2024-01-09 RX ADMIN — SODIUM CHLORIDE, PRESERVATIVE FREE 10 ML: 5 INJECTION INTRAVENOUS at 12:01

## 2024-01-09 RX ADMIN — PROPOFOL 100 MG: 10 INJECTION, EMULSION INTRAVENOUS at 09:51

## 2024-01-09 RX ADMIN — PROPOFOL 100 MG: 10 INJECTION, EMULSION INTRAVENOUS at 09:49

## 2024-01-09 ASSESSMENT — ENCOUNTER SYMPTOMS
COUGH: 0
NAUSEA: 1
DIARRHEA: 1
ABDOMINAL PAIN: 1
BACK PAIN: 1
CHEST TIGHTNESS: 0
SHORTNESS OF BREATH: 1
VOMITING: 1

## 2024-01-09 ASSESSMENT — PAIN DESCRIPTION - ORIENTATION
ORIENTATION: MID
ORIENTATION: MID

## 2024-01-09 ASSESSMENT — PAIN DESCRIPTION - LOCATION
LOCATION: ABDOMEN
LOCATION: ABDOMEN

## 2024-01-09 ASSESSMENT — PAIN DESCRIPTION - DESCRIPTORS
DESCRIPTORS: ACHING;CRAMPING
DESCRIPTORS: CRAMPING

## 2024-01-09 ASSESSMENT — PAIN SCALES - GENERAL
PAINLEVEL_OUTOF10: 10
PAINLEVEL_OUTOF10: 8

## 2024-01-09 ASSESSMENT — PAIN - FUNCTIONAL ASSESSMENT: PAIN_FUNCTIONAL_ASSESSMENT: NONE - DENIES PAIN

## 2024-01-09 NOTE — ANESTHESIA PRE PROCEDURE
Department of Anesthesiology  Preprocedure Note       Name:  Tristan Cedillo   Age:  53 y.o.  :  1970                                          MRN:  [id number]         Date:  2024      Surgeon: Surgeon(s):  Suma Sunshine MD    Procedure: Procedure(s):  EGD DIAGNOSTIC ONLY    Medications prior to admission:   Prior to Admission medications    Medication Sig Start Date End Date Taking? Authorizing Provider   testosterone (ANDROGEL) 20.25 MG/1.25GM (1.62%) GEL gel Apply 2 pump press to  upper arm and shoulder daily 23  Dino Emerson MD   hydrocortisone (CORTEF) 10 MG tablet Take 1 tablet by mouth daily Take in addition to the 5mg tab in the morning for a total of 15mg. 23   Dino Emerson MD   hydrocortisone (CORTEF) 5 MG tablet Take 1 tablet by mouth 2 times daily 23   Dino Emerson MD   levothyroxine (SYNTHROID) 137 MCG tablet Take 1 tablet by mouth Daily 23   Dino Emerson MD   pantoprazole (PROTONIX) 40 MG tablet Take 1 tablet by mouth 2 times daily (before meals) 23  Milanes Marino, Maria, MD   dicyclomine (BENTYL) 10 MG capsule Take 1 capsule by mouth 3 times daily 3/22/23   Mini Bolton DO   aspirin 81 MG chewable tablet Take 1 tablet by mouth daily 10/22/22   ProviderSera MD       Current medications:    No current facility-administered medications for this visit.     Current Outpatient Medications   Medication Sig Dispense Refill   • testosterone (ANDROGEL) 20.25 MG/1.25GM (1.62%) GEL gel Apply 2 pump press to  upper arm and shoulder daily 1.25 g 3   • hydrocortisone (CORTEF) 10 MG tablet Take 1 tablet by mouth daily Take in addition to the 5mg tab in the morning for a total of 15mg. 30 tablet 5   • hydrocortisone (CORTEF) 5 MG tablet Take 1 tablet by mouth 2 times daily 60 tablet 5   • levothyroxine (SYNTHROID) 137 MCG tablet Take 1 tablet by mouth Daily 30 tablet 5   • pantoprazole (PROTONIX) 40 MG tablet

## 2024-01-09 NOTE — ED NOTES
PT reports never refused blood in past stating \"I open to anything that helps me survive.  I'll take blood if I need it.\"  PT a&ox4

## 2024-01-09 NOTE — PROGRESS NOTES
4 Eyes Skin Assessment     NAME:  Tristan Cedillo  YOB: 1970  MEDICAL RECORD NUMBER:  [id number]    The patient is being assessed for  Admission    I agree that at least one RN has performed a thorough Head to Toe Skin Assessment on the patient. ALL assessment sites listed below have been assessed.      Areas assessed by both nurses:    Head, Face, Ears, Shoulders, Back, Chest, Arms, Elbows, Hands, Sacrum. Buttock, Coccyx, Ischium, and Legs. Feet and Heels        Does the Patient have a Wound? No noted wound(s)       Alex Prevention initiated by RN: No  Wound Care Orders initiated by RN: No    Pressure Injury (Stage 3,4, Unstageable, DTI, NWPT, and Complex wounds) if present, place Wound referral order by RN under : No    New Ostomies, if present place, Ostomy referral order under : No     Nurse 1 eSignature: Electronically signed by Diana Navarrete RN on 1/9/24 at 2:05 PM EST    **SHARE this note so that the co-signing nurse can place an eSignature**    Nurse 2 eSignature: Electronically signed by Melinda Adair RN on 1/9/24 at 3:28 PM EST
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
## 2024-01-09 NOTE — ANESTHESIA POSTPROCEDURE EVALUATION
Department of Anesthesiology  Postprocedure Note    Patient: Tristan Cedillo  MRN: [id number]  YOB: 1970  Date of evaluation: 1/9/2024    Procedure Summary       Date: 01/09/24 Room / Location: [place] [address] / [facility]    Anesthesia Start: 0939 Anesthesia Stop: 1002    Procedure: EGD BIOPSY Diagnosis:       Gastritis without bleeding, unspecified chronicity, unspecified gastritis type      (Gastritis without bleeding, unspecified chronicity, unspecified gastritis type [K29.70])    Surgeons: Suma Sunshine MD Responsible Provider: Brady Felipe DO    Anesthesia Type: MAC ASA Status: 3            Anesthesia Type: No value filed.    Cisco Phase I: Cisco Score: 10    Cisco Phase II:      Anesthesia Post Evaluation    Patient location during evaluation: bedside  Patient participation: complete - patient cannot participate  Level of consciousness: awake and alert  Airway patency: patent  Nausea & Vomiting: no nausea and no vomiting  Cardiovascular status: blood pressure returned to baseline  Respiratory status: acceptable  Hydration status: euvolemic  Multimodal analgesia pain management approach    No notable events documented.

## 2024-01-09 NOTE — DISCHARGE INSTRUCTIONS
SURGERY DISCHARGE INSTRUCTIONS    You have gastritis and esophageal varices. Recommend no smoking or drinking alcohol. Take your anti-acid medications as instructed.     FOLLOW UP: Call office to schedule follow-up appointment in 2 weeks.    DIET: Advance your diet as tolerated. Start with light diet and progress to your normal diet as you feel like eating. If you experience nausea or repeated episodes of vomiting which persist beyond 12-24 hours, notify your doctor.    ACTIVITY: No driving while on prescription pain medication. No heavy lifting for 6 weeks from surgery - nothing over 10 lbs, or heavier than a milk jug.    SHOWER/BATHING: No tub bathing, swimming or soaking for 2 weeks.      MEDICATIONS: Take as prescribed. You may take over the counter ibuprofen or tylenol for pain as directed, limit total amount of acetaminophen (tylenol) to 3 grams per 24-hour period. Okay to resume anticoagulant medication after 24hrs. You may experience constipation while taking pain medication, you may take over the counter stool softeners (Docusate/Colace or Senokot-S) as directed.

## 2024-01-09 NOTE — ED PROVIDER NOTES
endoscopy (N/A, 5/30/2023).    Social History:  reports that he has been smoking cigars. He has never used smokeless tobacco. He reports that he does not currently use alcohol. He reports current drug use. Drug: Marijuana (Weed).    Family History: family history includes Cancer in his maternal grandfather and mother; Diabetes in his maternal grandmother, maternal uncle, mother, and sister; Early Death in his brother; Kidney Disease in his mother.     The patient’s home medications have been reviewed.    Allergies: Reglan [metoclopramide]    -------------------------------------------------- RESULTS -------------------------------------------------  All laboratory and radiology results have been personally reviewed by myself   LABS:  Results for orders placed or performed during the hospital encounter of 01/08/24   Troponin   Result Value Ref Range    Troponin, High Sensitivity 11 0 - 11 ng/L   CBC with Auto Differential   Result Value Ref Range    WBC 8.1 4.5 - 11.5 k/uL    RBC 4.36 3.80 - 5.80 m/uL    Hemoglobin 12.6 12.5 - 16.5 g/dL    Hematocrit 36.7 (L) 37.0 - 54.0 %    MCV 84.2 80.0 - 99.9 fL    MCH 28.9 26.0 - 35.0 pg    MCHC 34.3 32.0 - 34.5 g/dL    RDW 12.7 11.5 - 15.0 %    Platelets 251 130 - 450 k/uL    MPV 9.4 7.0 - 12.0 fL    Neutrophils % 32 (L) 43.0 - 80.0 %    Lymphocytes % 56 (H) 20.0 - 42.0 %    Monocytes % 9 2.0 - 12.0 %    Eosinophils % 2 0 - 6 %    Basophils % 1 0.0 - 2.0 %    Immature Granulocytes 0 0.0 - 5.0 %    Neutrophils Absolute 2.60 1.80 - 7.30 k/uL    Lymphocytes Absolute 4.55 (H) 1.50 - 4.00 k/uL    Monocytes Absolute 0.72 0.10 - 0.95 k/uL    Eosinophils Absolute 0.15 0.05 - 0.50 k/uL    Basophils Absolute 0.04 0.00 - 0.20 k/uL    Absolute Immature Granulocyte 0.03 0.00 - 0.58 k/uL   Comprehensive Metabolic Panel   Result Value Ref Range    Sodium 130 (L) 132 - 146 mmol/L    Potassium 3.7 3.5 - 5.0 mmol/L    Chloride 92 (L) 98 - 107 mmol/L    CO2 24 22 - 29 mmol/L    Anion Gap 14 7 -

## 2024-01-10 VITALS
DIASTOLIC BLOOD PRESSURE: 62 MMHG | SYSTOLIC BLOOD PRESSURE: 108 MMHG | TEMPERATURE: 97.9 F | HEART RATE: 72 BPM | RESPIRATION RATE: 16 BRPM | BODY MASS INDEX: 23.86 KG/M2 | OXYGEN SATURATION: 96 % | WEIGHT: 190.92 LBS

## 2024-01-10 LAB
ANION GAP SERPL CALCULATED.3IONS-SCNC: 16 MMOL/L (ref 7–16)
BASOPHILS # BLD: 0.03 K/UL (ref 0–0.2)
BASOPHILS NFR BLD: 1 % (ref 0–2)
BUN SERPL-MCNC: 8 MG/DL (ref 6–20)
CALCIUM SERPL-MCNC: 9.5 MG/DL (ref 8.6–10.2)
CHLORIDE SERPL-SCNC: 96 MMOL/L (ref 98–107)
CO2 SERPL-SCNC: 20 MMOL/L (ref 22–29)
CREAT SERPL-MCNC: 0.9 MG/DL (ref 0.7–1.2)
EOSINOPHIL # BLD: 0.16 K/UL (ref 0.05–0.5)
EOSINOPHILS RELATIVE PERCENT: 3 % (ref 0–6)
ERYTHROCYTE [DISTWIDTH] IN BLOOD BY AUTOMATED COUNT: 12.7 % (ref 11.5–15)
GFR SERPL CREATININE-BSD FRML MDRD: >60 ML/MIN/1.73M2
GLUCOSE SERPL-MCNC: 72 MG/DL (ref 74–99)
HCT VFR BLD AUTO: 33.8 % (ref 37–54)
HGB BLD-MCNC: 11.3 G/DL (ref 12.5–16.5)
IMM GRANULOCYTES # BLD AUTO: <0.03 K/UL (ref 0–0.58)
IMM GRANULOCYTES NFR BLD: 0 % (ref 0–5)
LYMPHOCYTES NFR BLD: 3.27 K/UL (ref 1.5–4)
LYMPHOCYTES RELATIVE PERCENT: 51 % (ref 20–42)
MCH RBC QN AUTO: 28.5 PG (ref 26–35)
MCHC RBC AUTO-ENTMCNC: 33.4 G/DL (ref 32–34.5)
MCV RBC AUTO: 85.1 FL (ref 80–99.9)
MONOCYTES NFR BLD: 0.75 K/UL (ref 0.1–0.95)
MONOCYTES NFR BLD: 12 % (ref 2–12)
NEUTROPHILS NFR BLD: 34 % (ref 43–80)
NEUTS SEG NFR BLD: 2.18 K/UL (ref 1.8–7.3)
PLATELET # BLD AUTO: 206 K/UL (ref 130–450)
PMV BLD AUTO: 9.3 FL (ref 7–12)
POTASSIUM SERPL-SCNC: 4.1 MMOL/L (ref 3.5–5)
RBC # BLD AUTO: 3.97 M/UL (ref 3.8–5.8)
SODIUM SERPL-SCNC: 132 MMOL/L (ref 132–146)
WBC OTHER # BLD: 6.4 K/UL (ref 4.5–11.5)

## 2024-01-10 PROCEDURE — 36415 COLL VENOUS BLD VENIPUNCTURE: CPT

## 2024-01-10 PROCEDURE — 6370000000 HC RX 637 (ALT 250 FOR IP): Performed by: SURGERY

## 2024-01-10 PROCEDURE — 2580000003 HC RX 258: Performed by: SURGERY

## 2024-01-10 PROCEDURE — 6370000000 HC RX 637 (ALT 250 FOR IP)

## 2024-01-10 PROCEDURE — 6360000002 HC RX W HCPCS: Performed by: SURGERY

## 2024-01-10 PROCEDURE — 85025 COMPLETE CBC W/AUTO DIFF WBC: CPT

## 2024-01-10 PROCEDURE — 80048 BASIC METABOLIC PNL TOTAL CA: CPT

## 2024-01-10 RX ORDER — SUCRALFATE 1 G/1
1 TABLET ORAL
Status: DISCONTINUED | OUTPATIENT
Start: 2024-01-10 | End: 2024-01-10 | Stop reason: HOSPADM

## 2024-01-10 RX ORDER — SUCRALFATE 1 G/1
1 TABLET ORAL
Qty: 120 TABLET | Refills: 3 | Status: ON HOLD | OUTPATIENT
Start: 2024-01-10 | End: 2024-01-14

## 2024-01-10 RX ADMIN — PANTOPRAZOLE SODIUM 40 MG: 40 TABLET, DELAYED RELEASE ORAL at 07:43

## 2024-01-10 RX ADMIN — SUCRALFATE 1 G: 1 TABLET ORAL at 12:00

## 2024-01-10 RX ADMIN — ACETAMINOPHEN 650 MG: 325 TABLET ORAL at 12:00

## 2024-01-10 RX ADMIN — SODIUM CHLORIDE, PRESERVATIVE FREE 10 ML: 5 INJECTION INTRAVENOUS at 07:45

## 2024-01-10 RX ADMIN — OXYCODONE 5 MG: 5 TABLET ORAL at 06:10

## 2024-01-10 RX ADMIN — ENOXAPARIN SODIUM 40 MG: 100 INJECTION SUBCUTANEOUS at 07:43

## 2024-01-10 RX ADMIN — ACETAMINOPHEN 650 MG: 325 TABLET ORAL at 06:03

## 2024-01-10 ASSESSMENT — PAIN DESCRIPTION - DESCRIPTORS
DESCRIPTORS: ACHING;DISCOMFORT
DESCRIPTORS: ACHING;DISCOMFORT

## 2024-01-10 ASSESSMENT — PAIN SCALES - GENERAL
PAINLEVEL_OUTOF10: 7
PAINLEVEL_OUTOF10: 6

## 2024-01-10 ASSESSMENT — PAIN DESCRIPTION - LOCATION
LOCATION: ABDOMEN
LOCATION: ABDOMEN

## 2024-01-10 NOTE — PROGRESS NOTES
[facility]  PROGRESS NOTE  ATTENDING NOTE    Chief Complaint   Patient presents with    Abdominal Pain     Ongoing abdominal pain/cramping x4 days, +NV, worse today, normal BMs     S:  54y/o M with abdominal pain, hematemesis.  EGD showed gastritis.  States PPI and carafate hurt his stomach    /62   Pulse 72   Temp 97.9 °F (36.6 °C) (Temporal)   Resp 16   Wt 86.6 kg (190 lb 14.7 oz)   SpO2 96%   BMI 23.86 kg/m²   Gen:  NAD  Abd;  soft, ND, mild epigastric TTP    ASSESSMENT/PLAN:  Gastritis  --change protonix to lansoprazole  --advised patient to crush carafate and mix with water to make a slurry  --bland diet  --ok to discharge home    Suma Sunshine MD, MSc, FACS  1/10/2024  1:45 PM

## 2024-01-10 NOTE — PLAN OF CARE
Problem: ABCDS Injury Assessment  Goal: Absence of physical injury  1/10/2024 1130 by Rita Mason RN  Outcome: Progressing  1/9/2024 2351 by Kathia Burton RN  Outcome: Progressing     Problem: Pain  Goal: Verbalizes/displays adequate comfort level or baseline comfort level  1/10/2024 1130 by Rita Mason RN  Outcome: Progressing  1/9/2024 2351 by Kathia Burton RN  Outcome: Progressing     Problem: Safety - Adult  Goal: Free from fall injury  1/10/2024 1130 by Rita Mason RN  Outcome: Progressing  1/9/2024 2351 by Kathia Burton RN  Outcome: Progressing

## 2024-01-10 NOTE — CARE COORDINATION
Patient is POD#1 EGD which showed gastritis. Per general surgery note today, change protonix to lansoprazole. advised patient to crush carafate and mix with water to make a slurry. bland diet. ok to discharge home. RN notified and will check for discharge order. Patient is independent. PCP is Dr. Joel.  Pharmacy is [facility] on [address].   Zoey Harper RN   [phone]

## 2024-01-10 NOTE — PROGRESS NOTES
CLINICAL PHARMACY NOTE: MEDS TO BEDS    Total # of Prescriptions Filled: 1   The following medications were delivered to the patient:  SUCRALFATE 1GM    Additional Documentation:  DELIVERED TO PT

## 2024-01-10 NOTE — CARE COORDINATION
Discharge order noted. I met with patient in room to explain role and discuss transition of care. He said he has no needs for discharge and someone from his friends or family will transport him home today.  Zoey Harper RN   [phone]

## 2024-01-10 NOTE — DISCHARGE SUMMARY
Physician Discharge Summary     Patient ID:  Gabriella Cedillo  [id number]  53 y.o.  1970    Admit date: 2024    Discharge date and time: 1/10/2024  6:21 PM     Admitting Physician: Suma Sunshine MD     Admission Diagnoses: Abdominal pain [R10.9]  Acute gastritis, presence of bleeding unspecified, unspecified gastritis type [K29.00]    Discharge Diagnoses: Principal Problem:    Abdominal pain  Active Problems:    Acute gastritis  Resolved Problems:    * No resolved hospital problems. *      Admission Condition: fair    Discharged Condition: stable      Hospital Course:  Gabriella Cedillo is a 53 y.o. male who presented with .  Abdominal pain.  Patient had a history of gastric ulcers, reported later did not take any PPI as maintenance.  Also had a history of pericardial window.  He underwent EGD and work up revealed gastritis.  He was started on lansoprazole and Carafate the patient's course was otherwise uneventful. He progressed well, pain was controlled on PO medications. He was tolerating a regular diet with no nausea or vomiting, and was in a suitable condition for discharge to home in stable condition.      Consults:   IP CONSULT TO GENERAL SURGERY    Significant Diagnostic Studies:   EGD    Result Date: 2024  [facility] GABRIELLA CEDILLO MRN: [id number] : 1970 Account: [id number] Sex at Birth: Male Age: 53 Years Procedure: Upper GI endoscopy Date: 2024 Attending Physician: SUMA SUNSHINE Indications:        - abdominal pain, hematemesis Medications:        -  See the Anesthesia note for documentation of the administered medications Complications:        -  No immediate complications. Procedure:        - The GASTROSCOPE was introduced through the mouth and advanced to the second           part of the duodenum.        -  The upper GI endoscopy was accomplished without difficulty.        -  The patient tolerated the procedure well. Findings:        -  Scattered moderate 
  pantoprazole (PROTONIX) 40 MG tablet Take 1 tablet by mouth 2 times daily (before meals)  Qty: 60 tablet, Refills: 0      dicyclomine (BENTYL) 10 MG capsule Take 1 capsule by mouth 3 times daily  Qty: 90 capsule, Refills: 5    Associated Diagnoses: Gastroesophageal reflux disease with esophagitis without hemorrhage      aspirin 81 MG chewable tablet Take 1 tablet by mouth daily       !! - Potential duplicate medications found. Please discuss with provider.          Discharge Instructions  ***    Follow up:   Mini Bolton DO  [address] [id number] [phone]    Call in 2 days      Aravind Pritchard MD  [address] [id number] [phone]    Call today         Signed:  Rosaura Grigsby DO  1/9/2024  7:58 AM

## 2024-01-11 ENCOUNTER — TELEPHONE (OUTPATIENT)
Dept: FAMILY MEDICINE CLINIC | Age: 54
End: 2024-01-11

## 2024-01-11 ENCOUNTER — CARE COORDINATION (OUTPATIENT)
Dept: CARE COORDINATION | Age: 54
End: 2024-01-11

## 2024-01-11 NOTE — CARE COORDINATION
Care Transitions Initial Follow Up Call    Call within 2 business days of discharge: Yes    Patient: Tristan Cedillo Patient : 1970   MRN: [id number]  Reason for Admission: Abdominal Pain  Discharge Date: 1/10/24 RARS: Readmission Risk Score: 8.5      Last Discharge Facility       Date Complaint Diagnosis Description Type Department Provider    24 Abdominal Pain Acute gastritis, presence of bleeding unspecified, unspecified gastritis type ... ED to Hosp-Admission (Discharged) (ADMITTED) [facility] Suma Sunshine MD; Salbador Moran...          Attempted to contact patient today 24 for initial RAPHAEL/hospital discharge (low risk) follow up. Left message on home/mobile number requesting a return call back to CTN and provided contact information.     ANGELITO Ayala

## 2024-01-11 NOTE — TELEPHONE ENCOUNTER
Care Transitions Initial Follow Up Call    Outreach made within 2 business days of discharge: Yes    Patient: Tristan Cedillo Patient : 1970   MRN: [id number]  Reason for Admission: There are no discharge diagnoses documented for the most recent discharge.  Discharge Date: 1/10/24       Spoke with: 24 Left Message     Discharge department/facility: [facility] Patient Contact:  Was patient able to fill all prescriptions: Yes  Was patient instructed to bring all medications to the follow-up visit: Yes  Is patient taking all medications as directed in the discharge summary? Yes  Does patient understand their discharge instructions: Yes  Does patient have questions or concerns that need addressed prior to 7-14 day follow up office visit: no    Scheduled appointment with PCP within 7-14 days    Follow Up  Future Appointments   Date Time Provider Department Center   1/15/2024  8:45 AM Dino Emerson MD [facility]       Sebastien Velasquez

## 2024-01-12 ENCOUNTER — APPOINTMENT (OUTPATIENT)
Dept: CT IMAGING | Age: 54
DRG: 641 | End: 2024-01-12
Payer: MEDICARE

## 2024-01-12 ENCOUNTER — HOSPITAL ENCOUNTER (INPATIENT)
Age: 54
LOS: 2 days | Discharge: HOME | DRG: 641 | End: 2024-01-14
Attending: EMERGENCY MEDICINE | Admitting: INTERNAL MEDICINE
Payer: MEDICARE

## 2024-01-12 ENCOUNTER — CARE COORDINATION (OUTPATIENT)
Dept: CARE COORDINATION | Age: 54
End: 2024-01-12

## 2024-01-12 DIAGNOSIS — I72.3: ICD-10-CM

## 2024-01-12 DIAGNOSIS — E03.8 CENTRAL HYPOTHYROIDISM: ICD-10-CM

## 2024-01-12 DIAGNOSIS — E23.0 PANHYPOPITUITARISM (HCC): ICD-10-CM

## 2024-01-12 DIAGNOSIS — E27.49 SECONDARY ADRENAL INSUFFICIENCY (HCC): ICD-10-CM

## 2024-01-12 DIAGNOSIS — E87.1 HYPONATREMIA: Primary | ICD-10-CM

## 2024-01-12 DIAGNOSIS — E29.1 MALE HYPOGONADISM: ICD-10-CM

## 2024-01-12 DIAGNOSIS — R10.13 EPIGASTRIC PAIN: ICD-10-CM

## 2024-01-12 LAB
ALBUMIN SERPL-MCNC: 4 G/DL (ref 3.5–5.2)
ALP SERPL-CCNC: 41 U/L (ref 40–129)
ALT SERPL-CCNC: 11 U/L (ref 0–40)
AMMONIA PLAS-SCNC: <10 UMOL/L (ref 16–60)
AMPHET UR QL SCN: NEGATIVE
ANION GAP SERPL CALCULATED.3IONS-SCNC: 19 MMOL/L (ref 7–16)
ANION GAP SERPL CALCULATED.3IONS-SCNC: 20 MMOL/L (ref 7–16)
APAP SERPL-MCNC: <5 UG/ML (ref 10–30)
AST SERPL-CCNC: 17 U/L (ref 0–39)
B.E.: -2.9 MMOL/L (ref -3–3)
BARBITURATES UR QL SCN: NEGATIVE
BASOPHILS # BLD: 0.04 K/UL (ref 0–0.2)
BASOPHILS NFR BLD: 1 % (ref 0–2)
BENZODIAZ UR QL: NEGATIVE
BILIRUB SERPL-MCNC: 0.7 MG/DL (ref 0–1.2)
BUN SERPL-MCNC: 6 MG/DL (ref 6–20)
BUN SERPL-MCNC: 7 MG/DL (ref 6–20)
BUPRENORPHINE UR QL: NEGATIVE
CALCIUM SERPL-MCNC: 9.4 MG/DL (ref 8.6–10.2)
CALCIUM SERPL-MCNC: 9.5 MG/DL (ref 8.6–10.2)
CANNABINOIDS UR QL SCN: POSITIVE
CHLORIDE SERPL-SCNC: 84 MMOL/L (ref 98–107)
CHLORIDE SERPL-SCNC: 84 MMOL/L (ref 98–107)
CK SERPL-CCNC: 161 U/L (ref 20–200)
CO2 SERPL-SCNC: 17 MMOL/L (ref 22–29)
CO2 SERPL-SCNC: 19 MMOL/L (ref 22–29)
COCAINE UR QL SCN: NEGATIVE
COHB: 0.6 % (ref 0–1.5)
CORTIS SERPL-MCNC: 1.1 UG/DL (ref 2.7–18.4)
CORTISOL COLLECTION INFO: ABNORMAL
CREAT SERPL-MCNC: 0.8 MG/DL (ref 0.7–1.2)
CREAT SERPL-MCNC: 0.8 MG/DL (ref 0.7–1.2)
CREAT UR-MCNC: 75.2 MG/DL (ref 40–278)
CRITICAL: ABNORMAL
DATE ANALYZED: ABNORMAL
DATE OF COLLECTION: ABNORMAL
EOSINOPHIL # BLD: 0.17 K/UL (ref 0.05–0.5)
EOSINOPHILS RELATIVE PERCENT: 2 % (ref 0–6)
ERYTHROCYTE [DISTWIDTH] IN BLOOD BY AUTOMATED COUNT: 12.1 % (ref 11.5–15)
ETHANOLAMINE SERPL-MCNC: <10 MG/DL
FENTANYL UR QL: NEGATIVE
GFR SERPL CREATININE-BSD FRML MDRD: >60 ML/MIN/1.73M2
GFR SERPL CREATININE-BSD FRML MDRD: >60 ML/MIN/1.73M2
GLUCOSE BLD-MCNC: 75 MG/DL (ref 74–99)
GLUCOSE BLD-MCNC: 94 MG/DL (ref 74–99)
GLUCOSE SERPL-MCNC: 70 MG/DL (ref 74–99)
GLUCOSE SERPL-MCNC: 71 MG/DL (ref 74–99)
HCO3: 16.9 MMOL/L (ref 22–26)
HCT VFR BLD AUTO: 31.3 % (ref 37–54)
HGB BLD-MCNC: 11 G/DL (ref 12.5–16.5)
HHB: 1.7 % (ref 0–5)
IMM GRANULOCYTES # BLD AUTO: 0.03 K/UL (ref 0–0.58)
IMM GRANULOCYTES NFR BLD: 0 % (ref 0–5)
LAB: ABNORMAL
LACTATE BLDV-SCNC: 1.4 MMOL/L (ref 0.5–2.2)
LYMPHOCYTES NFR BLD: 3.4 K/UL (ref 1.5–4)
LYMPHOCYTES RELATIVE PERCENT: 47 % (ref 20–42)
Lab: 1855
MAGNESIUM SERPL-MCNC: 1.4 MG/DL (ref 1.6–2.6)
MCH RBC QN AUTO: 29 PG (ref 26–35)
MCHC RBC AUTO-ENTMCNC: 35.1 G/DL (ref 32–34.5)
MCV RBC AUTO: 82.6 FL (ref 80–99.9)
METHADONE UR QL: NEGATIVE
METHB: 0.3 % (ref 0–1.5)
MODE: ABNORMAL
MONOCYTES NFR BLD: 0.94 K/UL (ref 0.1–0.95)
MONOCYTES NFR BLD: 13 % (ref 2–12)
NEUTROPHILS NFR BLD: 37 % (ref 43–80)
NEUTS SEG NFR BLD: 2.71 K/UL (ref 1.8–7.3)
O2 CONTENT: 17.2 ML/DL
O2 SATURATION: 98.3 % (ref 92–98.5)
O2HB: 97.4 % (ref 94–97)
OPERATOR ID: 5523
OPIATES UR QL SCN: NEGATIVE
OSMOLALITY SERPL: 267 MOSM/KG (ref 285–310)
OSMOLALITY UR: 380 MOSM/KG (ref 300–900)
OXYCODONE UR QL SCN: NEGATIVE
PATIENT TEMP: 37 C
PCO2: 18.8 MMHG (ref 35–45)
PCP UR QL SCN: NEGATIVE
PH BLOOD GAS: 7.57 (ref 7.35–7.45)
PLATELET # BLD AUTO: 227 K/UL (ref 130–450)
PMV BLD AUTO: 9.4 FL (ref 7–12)
PO2: 92 MMHG (ref 75–100)
POTASSIUM SERPL-SCNC: 3.5 MMOL/L (ref 3.5–5)
POTASSIUM SERPL-SCNC: 3.5 MMOL/L (ref 3.5–5)
PROT SERPL-MCNC: 6.7 G/DL (ref 6.4–8.3)
RBC # BLD AUTO: 3.79 M/UL (ref 3.8–5.8)
SALICYLATES SERPL-MCNC: <0.3 MG/DL (ref 0–30)
SODIUM SERPL-SCNC: 121 MMOL/L (ref 132–146)
SODIUM SERPL-SCNC: 122 MMOL/L (ref 132–146)
SODIUM UR-SCNC: 72 MMOL/L
SOURCE, BLOOD GAS: ABNORMAL
T4 FREE SERPL-MCNC: 0.7 NG/DL (ref 0.9–1.7)
TEST INFORMATION: ABNORMAL
THB: 12.5 G/DL (ref 11.5–16.5)
TIME ANALYZED: 1907
TOTAL PROTEIN, URINE: 9 MG/DL (ref 0–12)
TOXIC TRICYCLIC SC,BLOOD: NEGATIVE
TROPONIN I SERPL HS-MCNC: 9 NG/L (ref 0–11)
TSH SERPL DL<=0.05 MIU/L-ACNC: <0.01 UIU/ML (ref 0.27–4.2)
URINE TOTAL PROTEIN CREATININE RATIO: 0.12 (ref 0–0.2)
WBC OTHER # BLD: 7.3 K/UL (ref 4.5–11.5)

## 2024-01-12 PROCEDURE — 84443 ASSAY THYROID STIM HORMONE: CPT

## 2024-01-12 PROCEDURE — 83930 ASSAY OF BLOOD OSMOLALITY: CPT

## 2024-01-12 PROCEDURE — 82962 GLUCOSE BLOOD TEST: CPT

## 2024-01-12 PROCEDURE — 70450 CT HEAD/BRAIN W/O DYE: CPT

## 2024-01-12 PROCEDURE — 84484 ASSAY OF TROPONIN QUANT: CPT

## 2024-01-12 PROCEDURE — 83735 ASSAY OF MAGNESIUM: CPT

## 2024-01-12 PROCEDURE — 80143 DRUG ASSAY ACETAMINOPHEN: CPT

## 2024-01-12 PROCEDURE — 80048 BASIC METABOLIC PNL TOTAL CA: CPT

## 2024-01-12 PROCEDURE — 74174 CTA ABD&PLVS W/CONTRAST: CPT

## 2024-01-12 PROCEDURE — 6360000002 HC RX W HCPCS

## 2024-01-12 PROCEDURE — 84439 ASSAY OF FREE THYROXINE: CPT

## 2024-01-12 PROCEDURE — 71275 CT ANGIOGRAPHY CHEST: CPT

## 2024-01-12 PROCEDURE — 82533 TOTAL CORTISOL: CPT

## 2024-01-12 PROCEDURE — 2060000000 HC ICU INTERMEDIATE R&B

## 2024-01-12 PROCEDURE — 82550 ASSAY OF CK (CPK): CPT

## 2024-01-12 PROCEDURE — 96376 TX/PRO/DX INJ SAME DRUG ADON: CPT

## 2024-01-12 PROCEDURE — 93005 ELECTROCARDIOGRAM TRACING: CPT | Performed by: STUDENT IN AN ORGANIZED HEALTH CARE EDUCATION/TRAINING PROGRAM

## 2024-01-12 PROCEDURE — 6370000000 HC RX 637 (ALT 250 FOR IP)

## 2024-01-12 PROCEDURE — 6360000004 HC RX CONTRAST MEDICATION: Performed by: RADIOLOGY

## 2024-01-12 PROCEDURE — 6360000002 HC RX W HCPCS: Performed by: STUDENT IN AN ORGANIZED HEALTH CARE EDUCATION/TRAINING PROGRAM

## 2024-01-12 PROCEDURE — 83935 ASSAY OF URINE OSMOLALITY: CPT

## 2024-01-12 PROCEDURE — 96365 THER/PROPH/DIAG IV INF INIT: CPT

## 2024-01-12 PROCEDURE — 80179 DRUG ASSAY SALICYLATE: CPT

## 2024-01-12 PROCEDURE — 82140 ASSAY OF AMMONIA: CPT

## 2024-01-12 PROCEDURE — 84156 ASSAY OF PROTEIN URINE: CPT

## 2024-01-12 PROCEDURE — 84300 ASSAY OF URINE SODIUM: CPT

## 2024-01-12 PROCEDURE — 82570 ASSAY OF URINE CREATININE: CPT

## 2024-01-12 PROCEDURE — 99285 EMERGENCY DEPT VISIT HI MDM: CPT

## 2024-01-12 PROCEDURE — 80307 DRUG TEST PRSMV CHEM ANLYZR: CPT

## 2024-01-12 PROCEDURE — 82805 BLOOD GASES W/O2 SATURATION: CPT

## 2024-01-12 PROCEDURE — G0480 DRUG TEST DEF 1-7 CLASSES: HCPCS

## 2024-01-12 PROCEDURE — 80053 COMPREHEN METABOLIC PANEL: CPT

## 2024-01-12 PROCEDURE — 85025 COMPLETE CBC W/AUTO DIFF WBC: CPT

## 2024-01-12 PROCEDURE — 96375 TX/PRO/DX INJ NEW DRUG ADDON: CPT

## 2024-01-12 PROCEDURE — 83605 ASSAY OF LACTIC ACID: CPT

## 2024-01-12 RX ORDER — SUCRALFATE 1 G/1
1 TABLET ORAL
Status: DISCONTINUED | OUTPATIENT
Start: 2024-01-12 | End: 2024-01-14 | Stop reason: HOSPADM

## 2024-01-12 RX ORDER — MAGNESIUM OXIDE 400 MG/1
400 TABLET ORAL DAILY
Status: DISCONTINUED | OUTPATIENT
Start: 2024-01-12 | End: 2024-01-14 | Stop reason: HOSPADM

## 2024-01-12 RX ORDER — FENTANYL CITRATE 0.05 MG/ML
25 INJECTION, SOLUTION INTRAMUSCULAR; INTRAVENOUS ONCE
Status: COMPLETED | OUTPATIENT
Start: 2024-01-12 | End: 2024-01-12

## 2024-01-12 RX ORDER — OXYCODONE HYDROCHLORIDE AND ACETAMINOPHEN 5; 325 MG/1; MG/1
1 TABLET ORAL EVERY 4 HOURS PRN
Status: DISCONTINUED | OUTPATIENT
Start: 2024-01-12 | End: 2024-01-14 | Stop reason: HOSPADM

## 2024-01-12 RX ORDER — PANTOPRAZOLE SODIUM 40 MG/1
40 TABLET, DELAYED RELEASE ORAL
Status: DISCONTINUED | OUTPATIENT
Start: 2024-01-13 | End: 2024-01-14 | Stop reason: HOSPADM

## 2024-01-12 RX ORDER — FENTANYL CITRATE 0.05 MG/ML
50 INJECTION, SOLUTION INTRAMUSCULAR; INTRAVENOUS ONCE
Status: COMPLETED | OUTPATIENT
Start: 2024-01-12 | End: 2024-01-12

## 2024-01-12 RX ORDER — LEVOTHYROXINE SODIUM 137 UG/1
137 TABLET ORAL DAILY
Status: DISCONTINUED | OUTPATIENT
Start: 2024-01-13 | End: 2024-01-14 | Stop reason: HOSPADM

## 2024-01-12 RX ORDER — MAGNESIUM SULFATE IN WATER 40 MG/ML
2000 INJECTION, SOLUTION INTRAVENOUS ONCE
Status: COMPLETED | OUTPATIENT
Start: 2024-01-12 | End: 2024-01-12

## 2024-01-12 RX ADMIN — IOPAMIDOL 75 ML: 755 INJECTION, SOLUTION INTRAVENOUS at 17:08

## 2024-01-12 RX ADMIN — SUCRALFATE 1 G: 1 TABLET ORAL at 23:15

## 2024-01-12 RX ADMIN — HYDROCORTISONE SODIUM SUCCINATE 100 MG: 100 INJECTION, POWDER, FOR SOLUTION INTRAMUSCULAR; INTRAVENOUS at 19:52

## 2024-01-12 RX ADMIN — FENTANYL CITRATE 25 MCG: 0.05 INJECTION, SOLUTION INTRAMUSCULAR; INTRAVENOUS at 19:52

## 2024-01-12 RX ADMIN — FENTANYL CITRATE 50 MCG: 0.05 INJECTION, SOLUTION INTRAMUSCULAR; INTRAVENOUS at 21:16

## 2024-01-12 RX ADMIN — MAGNESIUM OXIDE 400 MG: 400 TABLET ORAL at 23:15

## 2024-01-12 RX ADMIN — MAGNESIUM SULFATE HEPTAHYDRATE 2000 MG: 40 INJECTION, SOLUTION INTRAVENOUS at 19:55

## 2024-01-12 ASSESSMENT — PAIN SCALES - GENERAL: PAINLEVEL_OUTOF10: 9

## 2024-01-12 NOTE — ED NOTES
Radiology Procedure Waiver   Name: Tristan Cedillo  : 1970  MRN: [id number]    Date:  24    Time: 4:37 PM EST    Benefits of immediately proceeding with Radiology exam(s) without pre-testing outweigh the risks or are not indicated as specified below and therefore the following is/are being waived:    [] Pregnancy test   [] Patients LMP on-time and regular.   [] Patient had Tubal Ligation or has other Contraception Device.   [] Patient  is Menopausal or Premenarcheal.    [] Patient had Full or Partial Hysterectomy.    [] Protocol for Iodine allergy    [] MRI Questionnaire     [x] BUN/Creatinine   [] Patient age w/no hx of renal dysfunction.   [] Patient on Dialysis.   [] Recent Normal Labs.  Electronically signed by Noel Moran MD on 24 at 4:37 PM EST               Noel Moran MD  [phone]

## 2024-01-12 NOTE — CARE COORDINATION
Care Transitions Initial Follow Up Call    Call within 2 business days of discharge: Yes    Patient: Tristan Cedillo Patient : 1970   MRN: [id number]  Reason for Admission: Abdominal pain and s/p EGD  Discharge Date: 1/10/24 RARS: Readmission Risk Score: 8.5      Last Discharge Facility       Date Complaint Diagnosis Description Type Department Provider    24 Abdominal Pain Acute gastritis, presence of bleeding unspecified, unspecified gastritis type ... ED to Hosp-Admission (Discharged) (ADMITTED) [facility] Suma Sunshine MD; Salbador Moran...          Second attempt made today 24 to contact patient for initial RAPHAEL/hospital discharge follow up. Patient answers phone and hung up on this CTN after introduction and reason for call. CTN signing off for Care Transition and will notify PCP.     ANGELITO Ayala

## 2024-01-13 LAB
ALBUMIN SERPL-MCNC: 4.7 G/DL (ref 3.5–5.2)
ALP SERPL-CCNC: 59 U/L (ref 40–129)
ALT SERPL-CCNC: 14 U/L (ref 0–40)
ANION GAP SERPL CALCULATED.3IONS-SCNC: 18 MMOL/L (ref 7–16)
ANION GAP SERPL CALCULATED.3IONS-SCNC: 20 MMOL/L (ref 7–16)
ANION GAP SERPL CALCULATED.3IONS-SCNC: 20 MMOL/L (ref 7–16)
AST SERPL-CCNC: 22 U/L (ref 0–39)
BASOPHILS # BLD: 0.02 K/UL (ref 0–0.2)
BASOPHILS NFR BLD: 0 % (ref 0–2)
BILIRUB SERPL-MCNC: 0.4 MG/DL (ref 0–1.2)
BUN SERPL-MCNC: 7 MG/DL (ref 6–20)
BUN SERPL-MCNC: 8 MG/DL (ref 6–20)
BUN SERPL-MCNC: 9 MG/DL (ref 6–20)
CALCIUM SERPL-MCNC: 10.4 MG/DL (ref 8.6–10.2)
CALCIUM SERPL-MCNC: 10.5 MG/DL (ref 8.6–10.2)
CALCIUM SERPL-MCNC: 9.6 MG/DL (ref 8.6–10.2)
CHLORIDE SERPL-SCNC: 88 MMOL/L (ref 98–107)
CHLORIDE SERPL-SCNC: 88 MMOL/L (ref 98–107)
CHLORIDE SERPL-SCNC: 90 MMOL/L (ref 98–107)
CHOLEST SERPL-MCNC: 317 MG/DL
CO2 SERPL-SCNC: 18 MMOL/L (ref 22–29)
CO2 SERPL-SCNC: 19 MMOL/L (ref 22–29)
CO2 SERPL-SCNC: 20 MMOL/L (ref 22–29)
CORTIS SERPL-MCNC: 56 UG/DL (ref 2.7–18.4)
CORTISOL COLLECTION INFO: ABNORMAL
CREAT SERPL-MCNC: 0.8 MG/DL (ref 0.7–1.2)
EOSINOPHIL # BLD: 0.03 K/UL (ref 0.05–0.5)
EOSINOPHILS RELATIVE PERCENT: 0 % (ref 0–6)
ERYTHROCYTE [DISTWIDTH] IN BLOOD BY AUTOMATED COUNT: 12.3 % (ref 11.5–15)
FOLATE SERPL-MCNC: 18.1 NG/ML (ref 4.8–24.2)
GFR SERPL CREATININE-BSD FRML MDRD: >60 ML/MIN/1.73M2
GLUCOSE SERPL-MCNC: 103 MG/DL (ref 74–99)
GLUCOSE SERPL-MCNC: 107 MG/DL (ref 74–99)
GLUCOSE SERPL-MCNC: 79 MG/DL (ref 74–99)
HCT VFR BLD AUTO: 37.7 % (ref 37–54)
HDLC SERPL-MCNC: 29 MG/DL
HGB BLD-MCNC: 13 G/DL (ref 12.5–16.5)
IMM GRANULOCYTES # BLD AUTO: 0.05 K/UL (ref 0–0.58)
IMM GRANULOCYTES NFR BLD: 1 % (ref 0–5)
LDLC SERPL CALC-MCNC: 259 MG/DL
LYMPHOCYTES NFR BLD: 1.28 K/UL (ref 1.5–4)
LYMPHOCYTES RELATIVE PERCENT: 16 % (ref 20–42)
MCH RBC QN AUTO: 28.4 PG (ref 26–35)
MCHC RBC AUTO-ENTMCNC: 34.5 G/DL (ref 32–34.5)
MCV RBC AUTO: 82.5 FL (ref 80–99.9)
MONOCYTES NFR BLD: 0.31 K/UL (ref 0.1–0.95)
MONOCYTES NFR BLD: 4 % (ref 2–12)
NEUTROPHILS NFR BLD: 79 % (ref 43–80)
NEUTS SEG NFR BLD: 6.33 K/UL (ref 1.8–7.3)
PLATELET # BLD AUTO: 327 K/UL (ref 130–450)
PMV BLD AUTO: 9.5 FL (ref 7–12)
POTASSIUM SERPL-SCNC: 4.3 MMOL/L (ref 3.5–5)
POTASSIUM SERPL-SCNC: 4.6 MMOL/L (ref 3.5–5)
POTASSIUM SERPL-SCNC: 4.7 MMOL/L (ref 3.5–5)
PROT SERPL-MCNC: 8.5 G/DL (ref 6.4–8.3)
RBC # BLD AUTO: 4.57 M/UL (ref 3.8–5.8)
SODIUM SERPL-SCNC: 124 MMOL/L (ref 132–146)
SODIUM SERPL-SCNC: 128 MMOL/L (ref 132–146)
SODIUM SERPL-SCNC: 129 MMOL/L (ref 132–146)
TRIGL SERPL-MCNC: 144 MG/DL
VIT B12 SERPL-MCNC: 1307 PG/ML (ref 211–946)
VLDLC SERPL CALC-MCNC: 29 MG/DL
WBC OTHER # BLD: 8 K/UL (ref 4.5–11.5)

## 2024-01-13 PROCEDURE — 80061 LIPID PANEL: CPT

## 2024-01-13 PROCEDURE — 6370000000 HC RX 637 (ALT 250 FOR IP)

## 2024-01-13 PROCEDURE — 2060000000 HC ICU INTERMEDIATE R&B

## 2024-01-13 PROCEDURE — 82607 VITAMIN B-12: CPT

## 2024-01-13 PROCEDURE — 6370000000 HC RX 637 (ALT 250 FOR IP): Performed by: NURSE PRACTITIONER

## 2024-01-13 PROCEDURE — 6370000000 HC RX 637 (ALT 250 FOR IP): Performed by: INTERNAL MEDICINE

## 2024-01-13 PROCEDURE — 36415 COLL VENOUS BLD VENIPUNCTURE: CPT

## 2024-01-13 PROCEDURE — 80053 COMPREHEN METABOLIC PANEL: CPT

## 2024-01-13 PROCEDURE — 85025 COMPLETE CBC W/AUTO DIFF WBC: CPT

## 2024-01-13 PROCEDURE — 84403 ASSAY OF TOTAL TESTOSTERONE: CPT

## 2024-01-13 PROCEDURE — 82746 ASSAY OF FOLIC ACID SERUM: CPT

## 2024-01-13 PROCEDURE — 80048 BASIC METABOLIC PNL TOTAL CA: CPT

## 2024-01-13 PROCEDURE — 82533 TOTAL CORTISOL: CPT

## 2024-01-13 PROCEDURE — 84270 ASSAY OF SEX HORMONE GLOBUL: CPT

## 2024-01-13 PROCEDURE — 6360000002 HC RX W HCPCS

## 2024-01-13 RX ORDER — SODIUM BICARBONATE 650 MG/1
1300 TABLET ORAL 2 TIMES DAILY
Status: DISCONTINUED | OUTPATIENT
Start: 2024-01-13 | End: 2024-01-14 | Stop reason: HOSPADM

## 2024-01-13 RX ORDER — HYDROCORTISONE 5 MG/1
5 TABLET ORAL EVERY EVENING
Status: DISCONTINUED | OUTPATIENT
Start: 2024-01-13 | End: 2024-01-14 | Stop reason: HOSPADM

## 2024-01-13 RX ORDER — HYDROCORTISONE 5 MG/1
10 TABLET ORAL EVERY MORNING
Status: DISCONTINUED | OUTPATIENT
Start: 2024-01-13 | End: 2024-01-14 | Stop reason: HOSPADM

## 2024-01-13 RX ADMIN — SUCRALFATE 1 G: 1 TABLET ORAL at 06:23

## 2024-01-13 RX ADMIN — OXYCODONE AND ACETAMINOPHEN 1 TABLET: 5; 325 TABLET ORAL at 22:53

## 2024-01-13 RX ADMIN — HYDROCORTISONE SODIUM SUCCINATE 50 MG: 100 INJECTION, POWDER, FOR SOLUTION INTRAMUSCULAR; INTRAVENOUS at 09:07

## 2024-01-13 RX ADMIN — SUCRALFATE 1 G: 1 TABLET ORAL at 20:49

## 2024-01-13 RX ADMIN — SUCRALFATE 1 G: 1 TABLET ORAL at 13:12

## 2024-01-13 RX ADMIN — SODIUM BICARBONATE 650 MG TABLET 1300 MG: at 13:12

## 2024-01-13 RX ADMIN — LEVOTHYROXINE SODIUM 137 MCG: 0.14 TABLET ORAL at 13:12

## 2024-01-13 RX ADMIN — OXYCODONE AND ACETAMINOPHEN 1 TABLET: 5; 325 TABLET ORAL at 06:22

## 2024-01-13 RX ADMIN — OXYCODONE AND ACETAMINOPHEN 1 TABLET: 5; 325 TABLET ORAL at 11:00

## 2024-01-13 RX ADMIN — OXYCODONE AND ACETAMINOPHEN 1 TABLET: 5; 325 TABLET ORAL at 00:40

## 2024-01-13 RX ADMIN — SODIUM BICARBONATE 650 MG TABLET 1300 MG: at 20:48

## 2024-01-13 RX ADMIN — PANTOPRAZOLE SODIUM 40 MG: 40 TABLET, DELAYED RELEASE ORAL at 06:23

## 2024-01-13 RX ADMIN — SUCRALFATE 1 G: 1 TABLET ORAL at 17:54

## 2024-01-13 RX ADMIN — MAGNESIUM OXIDE 400 MG: 400 TABLET ORAL at 09:07

## 2024-01-13 RX ADMIN — HYDROCORTISONE 5 MG: 5 TABLET ORAL at 17:54

## 2024-01-13 RX ADMIN — OXYCODONE AND ACETAMINOPHEN 1 TABLET: 5; 325 TABLET ORAL at 17:54

## 2024-01-13 RX ADMIN — HYDROCORTISONE SODIUM SUCCINATE 50 MG: 100 INJECTION, POWDER, FOR SOLUTION INTRAMUSCULAR; INTRAVENOUS at 02:54

## 2024-01-13 ASSESSMENT — PAIN DESCRIPTION - LOCATION
LOCATION: BACK
LOCATION: ABDOMEN
LOCATION: BACK
LOCATION: BACK;NECK
LOCATION: ABDOMEN
LOCATION: ABDOMEN

## 2024-01-13 ASSESSMENT — PAIN SCALES - GENERAL
PAINLEVEL_OUTOF10: 6
PAINLEVEL_OUTOF10: 4
PAINLEVEL_OUTOF10: 6
PAINLEVEL_OUTOF10: 4
PAINLEVEL_OUTOF10: 3
PAINLEVEL_OUTOF10: 6
PAINLEVEL_OUTOF10: 7
PAINLEVEL_OUTOF10: 7
PAINLEVEL_OUTOF10: 6

## 2024-01-13 ASSESSMENT — PAIN DESCRIPTION - DESCRIPTORS
DESCRIPTORS: DISCOMFORT;THROBBING
DESCRIPTORS: ACHING

## 2024-01-13 ASSESSMENT — PAIN DESCRIPTION - ORIENTATION: ORIENTATION: LEFT

## 2024-01-13 NOTE — PROGRESS NOTES
Internal Medicine Progress Note    [facility]    Rony Stephenson D.O., ANASTACIA Snyder D.O., ANASTACIA Song D.O.    Elsie Guzman, MSN, APRN, NP-C  Chito Smith, MSN, APRN-CNP  Oskar Hughes, MSN, APRN, NP-C     Primary Care Physician: No primary care provider on file.   Admitting Physician:  Rony Stephenson DO  Admission date and time: 1/12/2024  4:42 PM    Room:  [address] [place] [address]  Admitting diagnosis: Hyponatremia [E87.1]    Patient Name: Tristan Cedillo  MRN: [id number]    Date of Service: 1/13/2024     Subjective:  Tristan is a 53 y.o. male who was seen and examined today,1/13/2024, at the bedside.  I spent an extreme amount of time with Tristan at the bedside today discussing the need for compliance moving forward.  He voiced understanding and agreement.  He is feeling dramatically better at this point.  We discussed discharge planning.    Review of System:   Constitutional:   Denies fever or chills, weight loss or gain, fatigue or malaise.  HEENT:   Denies ear pain, sore throat, sinus or eye problems.  Cardiovascular:   Denies any chest pain, irregular heartbeats, or palpitations.   Respiratory:   Denies shortness of breath, coughing, sputum production, hemoptysis, or wheezing.  Gastrointestinal:   Denies nausea, vomiting, diarrhea, or constipation.  Denies any abdominal pain.  Genitourinary:    Denies any urgency, frequency, hematuria. Voiding  without difficulty.  Extremities:   Denies lower extremity swelling, edema or cyanosis.   Neurology:    Denies any headache or focal neurological deficits, Denies generalized weakness or memory difficulty.   Psch:   Denies being anxious or depressed.  Musculoskeletal:    Denies  myalgias, joint complaints or back pain.   Integumentary:   Denies any rashes, ulcers, or excoriations.  Denies bruising.  Hematologic/Lymphatic:  Denies bruising or bleeding.    Physical Exam:  No

## 2024-01-13 NOTE — ED NOTES
Pt states no hx diabetes. Pt given 8oz orange juice per floor RN request to buffer sugar, drank 8 oz and tolerated well

## 2024-01-13 NOTE — ED PROVIDER NOTES
[facility]  EMERGENCY DEPARTMENT ENCOUNTER        Pt Name: Tristan Cedillo  MRN: [id number]  Birthdate 1970  Date of evaluation: 1/12/2024  Provider: Frederick Vargas DO  PCP: No primary care provider on file.  Note Started: 1:24 AM EST 1/13/24    CHIEF COMPLAINT       Chief Complaint   Patient presents with    Abdominal Pain     Pt was found lying on floor in ed lobby and brought back to ed#25, pt moaning and c/o chest pain,       HISTORY OF PRESENT ILLNESS: 1 or more Elements   History From: Patient and wife      Tristan Cedillo is a 53 y.o. male who presents to the emergency department for concerns of abdominal pain.  Patient was recently diagnosed with gastritis.  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  Patient is initially not responding to questions.  Reevaluation patient states that he has not taken his hydrocortisone in the last 3 to 4 days.  Patient has not had any nausea or vomiting.  Patient has just had abdominal pain.  Patient denies any chest pain.  Review of Systems   Constitutional:  Negative for activity change, appetite change, chills, fatigue and fever.   HENT:  Negative for congestion and sore throat.    Eyes:  Negative for visual disturbance.   Respiratory:  Negative for apnea, cough, chest tightness, shortness of breath and wheezing.    Cardiovascular:  Negative for chest pain.   Gastrointestinal:  Positive for abdominal pain. Negative for abdominal distention, constipation, diarrhea, nausea and vomiting.   Endocrine: Negative for polyuria.   Genitourinary:  Negative for decreased urine volume, dysuria and urgency.   Musculoskeletal:  Negative for back pain.   Skin:  Negative for rash.   Neurological:  Negative for dizziness, weakness, light-headedness, numbness and headaches.         Nursing Notes were all reviewed and agreed with or any disagreements were addressed in the HPI.    REVIEW OF SYSTEMS :      Positives and
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
## 2024-01-14 VITALS
RESPIRATION RATE: 18 BRPM | OXYGEN SATURATION: 100 % | SYSTOLIC BLOOD PRESSURE: 125 MMHG | DIASTOLIC BLOOD PRESSURE: 81 MMHG | TEMPERATURE: 98.2 F | HEART RATE: 77 BPM

## 2024-01-14 LAB
25(OH)D3 SERPL-MCNC: 14.7 NG/ML (ref 30–100)
ALBUMIN SERPL-MCNC: 4.4 G/DL (ref 3.5–5.2)
ALP SERPL-CCNC: 55 U/L (ref 40–129)
ALT SERPL-CCNC: 11 U/L (ref 0–40)
ANION GAP SERPL CALCULATED.3IONS-SCNC: 15 MMOL/L (ref 7–16)
AST SERPL-CCNC: 15 U/L (ref 0–39)
BASOPHILS # BLD: 0.05 K/UL (ref 0–0.2)
BASOPHILS NFR BLD: 0 % (ref 0–2)
BILIRUB SERPL-MCNC: 0.2 MG/DL (ref 0–1.2)
BUN SERPL-MCNC: 14 MG/DL (ref 6–20)
CA-I BLD-SCNC: 1.28 MMOL/L (ref 1.15–1.33)
CALCIUM SERPL-MCNC: 9.9 MG/DL (ref 8.6–10.2)
CHLORIDE SERPL-SCNC: 94 MMOL/L (ref 98–107)
CO2 SERPL-SCNC: 23 MMOL/L (ref 22–29)
CREAT SERPL-MCNC: 1.1 MG/DL (ref 0.7–1.2)
EOSINOPHIL # BLD: 0.15 K/UL (ref 0.05–0.5)
EOSINOPHILS RELATIVE PERCENT: 1 % (ref 0–6)
ERYTHROCYTE [DISTWIDTH] IN BLOOD BY AUTOMATED COUNT: 12.6 % (ref 11.5–15)
GFR SERPL CREATININE-BSD FRML MDRD: >60 ML/MIN/1.73M2
GLUCOSE SERPL-MCNC: 109 MG/DL (ref 74–99)
HCT VFR BLD AUTO: 36.2 % (ref 37–54)
HGB BLD-MCNC: 12.3 G/DL (ref 12.5–16.5)
IMM GRANULOCYTES # BLD AUTO: 0.05 K/UL (ref 0–0.58)
IMM GRANULOCYTES NFR BLD: 0 % (ref 0–5)
LYMPHOCYTES NFR BLD: 4.82 K/UL (ref 1.5–4)
LYMPHOCYTES RELATIVE PERCENT: 39 % (ref 20–42)
MCH RBC QN AUTO: 28.8 PG (ref 26–35)
MCHC RBC AUTO-ENTMCNC: 34 G/DL (ref 32–34.5)
MCV RBC AUTO: 84.8 FL (ref 80–99.9)
MONOCYTES NFR BLD: 1.08 K/UL (ref 0.1–0.95)
MONOCYTES NFR BLD: 9 % (ref 2–12)
NEUTROPHILS NFR BLD: 50 % (ref 43–80)
NEUTS SEG NFR BLD: 6.17 K/UL (ref 1.8–7.3)
PLATELET # BLD AUTO: 331 K/UL (ref 130–450)
PMV BLD AUTO: 9.5 FL (ref 7–12)
POTASSIUM SERPL-SCNC: 4.1 MMOL/L (ref 3.5–5)
PROT SERPL-MCNC: 7.9 G/DL (ref 6.4–8.3)
PSA SERPL-MCNC: 0.07 NG/ML (ref 0–4)
PTH-INTACT SERPL-MCNC: 29.5 PG/ML (ref 15–65)
RBC # BLD AUTO: 4.27 M/UL (ref 3.8–5.8)
SODIUM SERPL-SCNC: 132 MMOL/L (ref 132–146)
WBC OTHER # BLD: 12.3 K/UL (ref 4.5–11.5)

## 2024-01-14 PROCEDURE — 82306 VITAMIN D 25 HYDROXY: CPT

## 2024-01-14 PROCEDURE — 85025 COMPLETE CBC W/AUTO DIFF WBC: CPT

## 2024-01-14 PROCEDURE — 36415 COLL VENOUS BLD VENIPUNCTURE: CPT

## 2024-01-14 PROCEDURE — 83970 ASSAY OF PARATHORMONE: CPT

## 2024-01-14 PROCEDURE — 82330 ASSAY OF CALCIUM: CPT

## 2024-01-14 PROCEDURE — 6370000000 HC RX 637 (ALT 250 FOR IP)

## 2024-01-14 PROCEDURE — 80053 COMPREHEN METABOLIC PANEL: CPT

## 2024-01-14 PROCEDURE — 84153 ASSAY OF PSA TOTAL: CPT

## 2024-01-14 RX ORDER — SUCRALFATE 1 G/1
1 TABLET ORAL
Qty: 120 TABLET | Refills: 3 | Status: SHIPPED | OUTPATIENT
Start: 2024-01-14

## 2024-01-14 RX ORDER — LEVOTHYROXINE SODIUM 0.03 MG/1
TABLET ORAL
Status: DISCONTINUED
Start: 2024-01-14 | End: 2024-01-14 | Stop reason: WASHOUT

## 2024-01-14 RX ORDER — HYDROCORTISONE 10 MG/1
10 TABLET ORAL DAILY
Qty: 90 TABLET | Refills: 5 | Status: SHIPPED | OUTPATIENT
Start: 2024-01-14

## 2024-01-14 RX ORDER — LEVOTHYROXINE SODIUM 0.12 MG/1
TABLET ORAL
Status: DISCONTINUED
Start: 2024-01-14 | End: 2024-01-14 | Stop reason: WASHOUT

## 2024-01-14 RX ORDER — LEVOTHYROXINE SODIUM 137 UG/1
137 TABLET ORAL DAILY
Qty: 30 TABLET | Refills: 5 | Status: SHIPPED | OUTPATIENT
Start: 2024-01-14

## 2024-01-14 RX ORDER — TESTOSTERONE 20.25 MG/1.25G
GEL TOPICAL
Qty: 1.25 G | Refills: 3 | Status: SHIPPED | OUTPATIENT
Start: 2024-01-14 | End: 2024-07-29

## 2024-01-14 RX ADMIN — LEVOTHYROXINE SODIUM 137 MCG: 0.14 TABLET ORAL at 05:40

## 2024-01-14 RX ADMIN — OXYCODONE AND ACETAMINOPHEN 1 TABLET: 5; 325 TABLET ORAL at 05:23

## 2024-01-14 RX ADMIN — SUCRALFATE 1 G: 1 TABLET ORAL at 05:23

## 2024-01-14 RX ADMIN — PANTOPRAZOLE SODIUM 40 MG: 40 TABLET, DELAYED RELEASE ORAL at 05:23

## 2024-01-14 ASSESSMENT — PAIN SCALES - GENERAL
PAINLEVEL_OUTOF10: 6
PAINLEVEL_OUTOF10: 6

## 2024-01-14 ASSESSMENT — PAIN DESCRIPTION - LOCATION: LOCATION: BACK

## 2024-01-14 NOTE — PROGRESS NOTES
The patient removed his telemetry monitoring box. This RN entered the room to replace the leads, and the patient refused to replace monitor. Patient states \"you don't understand, the doctor said I'm good and I can go home tomorrow. There's no reason for me to be wearing that.\" Attempted to educate the patient on the importance of cardiac monitoring, but patient refused education. Patient was provided with a refusal form to document telemetry refusal. Patient read form and declined to sign.

## 2024-01-15 ENCOUNTER — TELEPHONE (OUTPATIENT)
Dept: FAMILY MEDICINE CLINIC | Age: 54
End: 2024-01-15

## 2024-01-15 LAB
EKG ATRIAL RATE: 52 BPM
EKG P AXIS: 59 DEGREES
EKG P-R INTERVAL: 198 MS
EKG Q-T INTERVAL: 636 MS
EKG QRS DURATION: 184 MS
EKG QTC CALCULATION (BAZETT): 591 MS
EKG R AXIS: -51 DEGREES
EKG T AXIS: -61 DEGREES
EKG VENTRICULAR RATE: 52 BPM
SURGICAL PATHOLOGY REPORT: NORMAL

## 2024-01-15 PROCEDURE — 93010 ELECTROCARDIOGRAM REPORT: CPT | Performed by: INTERNAL MEDICINE

## 2024-01-16 ENCOUNTER — CARE COORDINATION (OUTPATIENT)
Dept: CARE COORDINATION | Age: 54
End: 2024-01-16

## 2024-01-17 ENCOUNTER — CARE COORDINATION (OUTPATIENT)
Dept: CARE COORDINATION | Age: 54
End: 2024-01-17

## 2024-01-17 NOTE — CARE COORDINATION
Care Transitions Outreach Attempt    Call within 2 business days of discharge: Yes   Second attempt made to reach patient for Initial Care Transitions call, Post Hospital discharge. Unable to reach patient. CTN left HIPAA compliant message requesting return call. If no return call today Pt will be excluded from Care Transition calls until 24 (one month) due to being unable to reach after two consecutive admissions. CTN will send Unable to Reach Letter to Pt via Sp[employer] App message.     CTN routed message to PCP Clinical Staff/ Staff to schedule Hosp F/U on 24.    Patient: Tristan Cedillo Patient : 1970 MRN: <[id number]>  Reason for Admission: READMIT [facility] -24 Hyponatremia  Discharge Date: 24       RARS: Readmission Risk Score: 13.7    Last Discharge Facility       Date Complaint Diagnosis Description Type Department Provider    24 Abdominal Pain Hyponatremia ... ED to Hosp-Admission (Discharged) (ADMITTED) [facility] Rony Decker, DO; Latricia Burrows ...          Was this an external facility discharge? No Discharge Facility Name: n/a    Noted following upcoming appointments from discharge chart review:   [facility] follow up appointment(s): No future appointments.  Non-[facility]  follow up appointment(s):     Loretta Hidalgo LPN

## 2024-01-18 LAB
SHBG SERPL-SCNC: 64 NMOL/L (ref 11–80)
TESTOST FREE MFR SERPL: ABNORMAL PG/ML (ref 47–244)
TESTOST SERPL-MCNC: <3 NG/DL (ref 220–1000)

## 2024-03-07 ENCOUNTER — TELEPHONE (OUTPATIENT)
Dept: FAMILY MEDICINE CLINIC | Age: 54
End: 2024-03-07

## 2024-03-07 NOTE — TELEPHONE ENCOUNTER
Patient called in with questions concerning HRT (hormone replacement therapy). 1) can he take striction D with HRT? 2) how to wean off of HRT?

## 2024-07-31 ENCOUNTER — OFFICE VISIT (OUTPATIENT)
Dept: FAMILY MEDICINE CLINIC | Age: 54
End: 2024-07-31
Payer: MEDICARE

## 2024-07-31 VITALS
TEMPERATURE: 97.4 F | HEART RATE: 57 BPM | OXYGEN SATURATION: 98 % | SYSTOLIC BLOOD PRESSURE: 128 MMHG | HEIGHT: 75 IN | BODY MASS INDEX: 25.53 KG/M2 | DIASTOLIC BLOOD PRESSURE: 80 MMHG | WEIGHT: 205.3 LBS

## 2024-07-31 DIAGNOSIS — Z76.0 ENCOUNTER FOR MEDICATION REFILL: Primary | ICD-10-CM

## 2024-07-31 PROCEDURE — G8428 CUR MEDS NOT DOCUMENT: HCPCS | Performed by: PHYSICIAN ASSISTANT

## 2024-07-31 PROCEDURE — G8419 CALC BMI OUT NRM PARAM NOF/U: HCPCS | Performed by: PHYSICIAN ASSISTANT

## 2024-07-31 PROCEDURE — 4004F PT TOBACCO SCREEN RCVD TLK: CPT | Performed by: PHYSICIAN ASSISTANT

## 2024-07-31 PROCEDURE — 3017F COLORECTAL CA SCREEN DOC REV: CPT | Performed by: PHYSICIAN ASSISTANT

## 2024-07-31 PROCEDURE — 99212 OFFICE O/P EST SF 10 MIN: CPT | Performed by: PHYSICIAN ASSISTANT

## 2024-07-31 RX ORDER — LEVOTHYROXINE SODIUM 137 UG/1
137 TABLET ORAL DAILY
Qty: 30 TABLET | Refills: 1 | Status: CANCELLED | OUTPATIENT
Start: 2024-07-31

## 2024-07-31 RX ORDER — HYDROCORTISONE 5 MG/1
5 TABLET ORAL 2 TIMES DAILY
Qty: 60 TABLET | Refills: 1 | Status: CANCELLED | OUTPATIENT
Start: 2024-07-31

## 2024-07-31 RX ORDER — HYDROCORTISONE 10 MG/1
10 TABLET ORAL DAILY
Qty: 30 TABLET | Refills: 1 | Status: CANCELLED | OUTPATIENT
Start: 2024-07-31

## 2024-07-31 RX ORDER — TESTOSTERONE 20.25 MG/1.25G
GEL TOPICAL
Qty: 1.25 G | Refills: 1 | Status: CANCELLED | OUTPATIENT
Start: 2024-07-31 | End: 2025-02-15

## 2024-07-31 SDOH — ECONOMIC STABILITY: FOOD INSECURITY: WITHIN THE PAST 12 MONTHS, YOU WORRIED THAT YOUR FOOD WOULD RUN OUT BEFORE YOU GOT MONEY TO BUY MORE.: NEVER TRUE

## 2024-07-31 SDOH — ECONOMIC STABILITY: FOOD INSECURITY: WITHIN THE PAST 12 MONTHS, THE FOOD YOU BOUGHT JUST DIDN'T LAST AND YOU DIDN'T HAVE MONEY TO GET MORE.: NEVER TRUE

## 2024-07-31 SDOH — ECONOMIC STABILITY: INCOME INSECURITY: HOW HARD IS IT FOR YOU TO PAY FOR THE VERY BASICS LIKE FOOD, HOUSING, MEDICAL CARE, AND HEATING?: NOT HARD AT ALL
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
## 2024-07-31 NOTE — PATIENT INSTRUCTIONS
Recommend follow up with primary care provider and endocrinologist    [facility] day [facility]    Call Jeremías to transfer medication  Sonya Baez. [phone]

## 2024-09-06 DIAGNOSIS — E29.1 MALE HYPOGONADISM: ICD-10-CM

## 2024-09-06 DIAGNOSIS — E23.0 PANHYPOPITUITARISM (HCC): ICD-10-CM

## 2024-09-06 DIAGNOSIS — E27.49 SECONDARY ADRENAL INSUFFICIENCY (HCC): ICD-10-CM

## 2024-09-06 RX ORDER — TESTOSTERONE 20.25 MG/1.25G
GEL TOPICAL
Qty: 1.25 G | Refills: 3 | Status: SHIPPED
Start: 2024-09-06 | End: 2024-09-06 | Stop reason: SDUPTHER

## 2024-09-06 RX ORDER — HYDROCORTISONE 10 MG/1
10 TABLET ORAL DAILY
Qty: 90 TABLET | Refills: 5 | Status: SHIPPED | OUTPATIENT
Start: 2024-09-06 | End: 2024-09-06 | Stop reason: SDUPTHER

## 2024-09-06 RX ORDER — HYDROCORTISONE 10 MG/1
10 TABLET ORAL DAILY
Qty: 90 TABLET | Refills: 5 | Status: SHIPPED | OUTPATIENT
Start: 2024-09-06

## 2024-09-06 RX ORDER — TESTOSTERONE 20.25 MG/1.25G
GEL TOPICAL
Qty: 1.25 G | Refills: 3 | Status: SHIPPED | OUTPATIENT
Start: 2024-09-06 | End: 2025-03-24

## 2024-09-06 RX ORDER — HYDROCORTISONE 10 MG/1
10 TABLET ORAL DAILY
Qty: 90 TABLET | Refills: 5 | OUTPATIENT
Start: 2024-09-06

## 2024-09-06 RX ORDER — TESTOSTERONE 20.25 MG/1.25G
GEL TOPICAL
Qty: 1.25 G | Refills: 3 | OUTPATIENT
Start: 2024-09-06 | End: 2025-03-24

## 2024-09-06 NOTE — TELEPHONE ENCOUNTER
Called patient and clarified. He needed them to [facility] in [place] not [place]. RX sent

## 2024-09-06 NOTE — TELEPHONE ENCOUNTER
Last Appointment:  Visit date not found  Future Appointments   Date Time Provider Department Center   9/11/2024 11:30 AM Raoul Gregory DO [facility]   12/17/2024  9:45 AM Dino Emerson MD [facility]

## 2024-09-06 NOTE — TELEPHONE ENCOUNTER
Patient medications were sent to [facility] on [address] but the patient lives in [place]. So he needs them resent sent to the [facility]'s on [facility] in [place].

## 2024-09-11 ENCOUNTER — OFFICE VISIT (OUTPATIENT)
Dept: FAMILY MEDICINE CLINIC | Age: 54
End: 2024-09-11
Payer: MEDICARE

## 2024-09-11 VITALS
DIASTOLIC BLOOD PRESSURE: 74 MMHG | HEIGHT: 75 IN | SYSTOLIC BLOOD PRESSURE: 104 MMHG | BODY MASS INDEX: 25.92 KG/M2 | OXYGEN SATURATION: 98 % | WEIGHT: 208.5 LBS | HEART RATE: 63 BPM | TEMPERATURE: 98.4 F

## 2024-09-11 DIAGNOSIS — E27.49 SECONDARY ADRENAL INSUFFICIENCY (HCC): Primary | ICD-10-CM

## 2024-09-11 DIAGNOSIS — Z76.89 ENCOUNTER TO ESTABLISH CARE WITH NEW DOCTOR: ICD-10-CM

## 2024-09-11 PROCEDURE — 99213 OFFICE O/P EST LOW 20 MIN: CPT | Performed by: STUDENT IN AN ORGANIZED HEALTH CARE EDUCATION/TRAINING PROGRAM

## 2024-09-11 RX ORDER — HYDROCORTISONE 5 MG/1
5 TABLET ORAL DAILY
Qty: 90 TABLET | Refills: 4 | Status: SHIPPED | OUTPATIENT
Start: 2024-09-11

## 2024-09-11 ASSESSMENT — ENCOUNTER SYMPTOMS
VOMITING: 0
ABDOMINAL PAIN: 0
COUGH: 0
SINUS PAIN: 0
EYE PAIN: 0
WHEEZING: 0
NAUSEA: 0
SHORTNESS OF BREATH: 0
SORE THROAT: 0

## (undated) DEVICE — BITEBLOCK 54FR W/ DENT RIM BLOX

## (undated) DEVICE — SPONGE,PEANUT,XRAY,ST,SM,3/8",5/CARD: Brand: MEDLINE INDUSTRIES, INC.

## (undated) DEVICE — DEFENDO AIR WATER SUCTION AND BIOPSY VALVE KIT FOR  OLYMPUS: Brand: DEFENDO AIR/WATER/SUCTION AND BIOPSY VALVE

## (undated) DEVICE — GARMENT,MEDLINE,DVT,INT,CALF,MED, GEN2: Brand: MEDLINE

## (undated) DEVICE — CLIP LIG M BLU TI HRT SHP WIRE HORZ 600 PER BX

## (undated) DEVICE — GLOVE SURG SZ 65 THK91MIL LTX FREE SYN POLYISOPRENE

## (undated) DEVICE — GLOVE ORANGE PI 7   MSG9070

## (undated) DEVICE — AIR SHEET,LAT,COMFORT GLIDE, BLEND 40X80: Brand: MEDLINE

## (undated) DEVICE — FORCEPS BX L160CM JAW DIA2.4MM YEL L CAP W/ NDL DISP RAD

## (undated) DEVICE — ADHESIVE SKIN CLOSURE TOP 36 CC HI VISC DERMBND MINI

## (undated) DEVICE — BLADE CLIPPER GEN PURP NS

## (undated) DEVICE — MEDI-TRACE CADENCE ADULT, PRE-CONNECT  DEFIBRILLATION ELECTRODE (10 PR/PK) - PHYSIO-CONTROL: Brand: MEDI-TRACE CADENCE

## (undated) DEVICE — KIT SURG W7XL11IN 2 PKT UNTREATED NA

## (undated) DEVICE — ADHESIVE SKIN CLSR 0.7ML TOP DERMBND ADV

## (undated) DEVICE — INTENDED FOR TISSUE SEPARATION, AND OTHER PROCEDURES THAT REQUIRE A SHARP SURGICAL BLADE TO PUNCTURE OR CUT.: Brand: BARD-PARKER ® STAINLESS STEEL BLADES

## (undated) DEVICE — GAUZE,SPONGE,4"X4",8PLY,STRL,LF,10/TRAY: Brand: MEDLINE

## (undated) DEVICE — UNIVERSAL DRAPE: Brand: MEDLINE INDUSTRIES, INC.

## (undated) DEVICE — CANNULA NSL ORAL AD FOR CAPNOFLEX CO2 O2 AIRLFE

## (undated) DEVICE — CONTAINER SPEC 60ML PH 7NEUTRAL BUFF FRMLN RDY TO USE

## (undated) DEVICE — CLEANER,CAUTERY TIP,2X2",STERILE: Brand: MEDLINE

## (undated) DEVICE — AGENT HEMSTAT W4XL8IN OXIDIZED REGENERATED CELOS ABSRB

## (undated) DEVICE — DOUBLE BASIN SET: Brand: MEDLINE INDUSTRIES, INC.

## (undated) DEVICE — CLOTH SURG PREP PREOPERATIVE CHLORHEXIDINE GLUC 2% READYPREP

## (undated) DEVICE — BLADE ES L2.44IN S STL HEX LOK DISP VALLEYLAB

## (undated) DEVICE — SYRINGE MED 10ML POLYPR LUERSLIP TIP FLAT TOP W/O SFTY DISP

## (undated) DEVICE — TRAP,MUCUS SPECIMEN,40CC: Brand: MEDLINE

## (undated) DEVICE — ELECTRODE PT RET AD L9FT HI MOIST COND ADH HYDRGEL CORDED

## (undated) DEVICE — 3M™ IOBAN™ 2 ANTIMICROBIAL INCISE DRAPE 6650EZ: Brand: IOBAN™ 2

## (undated) DEVICE — 3M™ MEDIPORE™ + PAD 3564: Brand: 3M™ MEDIPORE™

## (undated) DEVICE — GOWN,SIRUS,FABRNF,L,20/CS: Brand: MEDLINE